# Patient Record
Sex: FEMALE | Race: BLACK OR AFRICAN AMERICAN | Employment: OTHER | URBAN - METROPOLITAN AREA
[De-identification: names, ages, dates, MRNs, and addresses within clinical notes are randomized per-mention and may not be internally consistent; named-entity substitution may affect disease eponyms.]

---

## 2017-03-23 RX ORDER — AMLODIPINE BESYLATE 5 MG/1
TABLET ORAL
Qty: 90 TAB | Refills: 0 | Status: SHIPPED | OUTPATIENT
Start: 2017-03-23 | End: 2017-08-02 | Stop reason: SDUPTHER

## 2017-04-03 ENCOUNTER — OFFICE VISIT (OUTPATIENT)
Dept: FAMILY MEDICINE CLINIC | Age: 52
End: 2017-04-03

## 2017-04-03 VITALS
DIASTOLIC BLOOD PRESSURE: 80 MMHG | SYSTOLIC BLOOD PRESSURE: 120 MMHG | OXYGEN SATURATION: 99 % | HEIGHT: 62 IN | WEIGHT: 161.8 LBS | RESPIRATION RATE: 16 BRPM | HEART RATE: 71 BPM | BODY MASS INDEX: 29.77 KG/M2 | TEMPERATURE: 98.1 F

## 2017-04-03 DIAGNOSIS — Z13.0 SCREENING FOR DEFICIENCY ANEMIA: ICD-10-CM

## 2017-04-03 DIAGNOSIS — E55.9 VITAMIN D DEFICIENCY: ICD-10-CM

## 2017-04-03 DIAGNOSIS — I10 ESSENTIAL HYPERTENSION: Primary | ICD-10-CM

## 2017-04-03 DIAGNOSIS — Z13.220 SCREENING FOR HYPERLIPIDEMIA: ICD-10-CM

## 2017-04-03 DIAGNOSIS — Z13.1 SCREENING FOR DIABETES MELLITUS: ICD-10-CM

## 2017-04-03 RX ORDER — AMLODIPINE BESYLATE 5 MG/1
TABLET ORAL
Qty: 90 TAB | Refills: 0 | Status: CANCELLED | OUTPATIENT
Start: 2017-04-03

## 2017-04-03 NOTE — MR AVS SNAPSHOT
Visit Information Date & Time Provider Department Dept. Phone Encounter #  
 4/3/2017  9:30 AM [facility] [phone] Follow-up Instructions Return in about 1 month (around 5/3/2017). Upcoming Health Maintenance Date Due  
 PAP AKA CERVICAL CYTOLOGY 3/3/2018 BREAST CANCER SCRN MAMMOGRAM 8/6/2018 COLONOSCOPY 4/12/2019 DTaP/Tdap/Td series (2 - Td) 3/3/2025 Allergies as of 4/3/2017  Review Complete On: 4/3/2017 By: Thad Castillo MD  
 No Known Allergies Current Immunizations  Never Reviewed Name Date Tdap 3/3/2015 10:17 AM  
  
 Not reviewed this visit You Were Diagnosed With   
  
 Codes Comments Essential hypertension    -  Primary ICD-10-CM: I10 
ICD-9-CM: 401.9 Screening for hyperlipidemia     ICD-10-CM: Z13.220 ICD-9-CM: V77.91 Screening for diabetes mellitus     ICD-10-CM: Z13.1 ICD-9-CM: V77.1 Vitamin D deficiency     ICD-10-CM: E55.9 ICD-9-CM: 268.9 Screening for deficiency anemia     ICD-10-CM: Z13.0 ICD-9-CM: V78.1 Vitals BP Pulse Temp Resp Height(growth percentile) Weight(growth percentile) 120/80 (BP 1 Location: Left arm, BP Patient Position: Sitting) 71 98.1 °F (36.7 °C) (Oral) 16 5' 2\" (1.575 m) 161 lb 12.8 oz (73.4 kg) LMP SpO2 BMI OB Status Smoking Status 04/03/2017 99% 29.59 kg/m2 Having regular periods Never Smoker Vitals History BMI and BSA Data Body Mass Index Body Surface Area  
 29.59 kg/m 2 1.79 m 2 Preferred Pharmacy Pharmacy Name Phone FARM [id number] - [address] [phone] Your Updated Medication List  
  
   
This list is accurate as of: 4/3/17 10:06 AM.  Always use your most recent med list. amLODIPine 5 mg tablet Commonly known as:  Sherald Burkitt TAKE ONE TABLET BY MOUTH ONE TIME DAILY  
  
 cholecalciferol 1,000 unit Cap Commonly known as:  VITAMIN D3 Take 1,000 Units by mouth two (2) times a day. IRON PO Take 325 mg by mouth three (3) times daily. MULTIVITAMIN WITH IRON PO Take 1 Tab by mouth daily. Follow-up Instructions Return in about 1 month (around 5/3/2017). To-Do List   
 04/03/2017 Lab:  CBC W/O DIFF   
  
 04/03/2017 Lab:  HEMOGLOBIN A1C WITH EAG   
  
 04/03/2017 Lab:  LIPID PANEL   
  
 04/03/2017 Lab:  METABOLIC PANEL, COMPREHENSIVE   
  
 04/03/2017 Lab:  VITAMIN D, 25 HYDROXY Patient Instructions Low Sodium Diet (2,000 Milligram): Care Instructions Your Care Instructions Too much sodium causes your body to hold on to extra water. This can raise your blood pressure and force your heart and kidneys to work harder. In very serious cases, this could cause you to be put in the hospital. It might even be life-threatening. By limiting sodium, you will feel better and lower your risk of serious problems. The most common source of sodium is salt. People get most of the salt in their diet from canned, prepared, and packaged foods. Fast food and restaurant meals also are very high in sodium. Your doctor will probably limit your sodium to less than 2,000 milligrams (mg) a day. This limit counts all the sodium in prepared and packaged foods and any salt you add to your food. Follow-up care is a key part of your treatment and safety. Be sure to make and go to all appointments, and call your doctor if you are having problems. It's also a good idea to know your test results and keep a list of the medicines you take. How can you care for yourself at home? Read food labels · Read labels on cans and food packages. The labels tell you how much sodium is in each serving. Make sure that you look at the serving size. If you eat more than the serving size, you have eaten more sodium. · Food labels also tell you the Percent Daily Value for sodium.  Choose products with low Percent Daily Values for sodium. · Be aware that sodium can come in forms other than salt, including monosodium glutamate (MSG), sodium citrate, and sodium bicarbonate (baking soda). MSG is often added to Asian food. When you eat out, you can sometimes ask for food without MSG or added salt. Buy low-sodium foods · Buy foods that are labeled \"unsalted\" (no salt added), \"sodium-free\" (less than 5 mg of sodium per serving), or \"low-sodium\" (less than 140 mg of sodium per serving). Foods labeled \"reduced-sodium\" and \"light sodium\" may still have too much sodium. Be sure to read the label to see how much sodium you are getting. · Buy fresh vegetables, or frozen vegetables without added sauces. Buy low-sodium versions of canned vegetables, soups, and other canned goods. Prepare low-sodium meals · Cut back on the amount of salt you use in cooking. This will help you adjust to the taste. Do not add salt after cooking. One teaspoon of salt has about 2,300 mg of sodium. · Take the salt shaker off the table. · Flavor your food with garlic, lemon juice, onion, vinegar, herbs, and spices. Do not use soy sauce, lite soy sauce, steak sauce, onion salt, garlic salt, celery salt, mustard, or ketchup on your food. · Use low-sodium salad dressings, sauces, and ketchup. Or make your own salad dressings and sauces without adding salt. · Use less salt (or none) when recipes call for it. You can often use half the salt a recipe calls for without losing flavor. Other foods such as rice, pasta, and grains do not need added salt. · Rinse canned vegetables, and cook them in fresh water. This removes somebut not allof the salt. · Avoid water that is naturally high in sodium or that has been treated with water softeners, which add sodium. Call your local water company to find out the sodium content of your water supply. If you buy bottled water, read the label and choose a sodium-free brand. Avoid high-sodium foods · Avoid eating: ¨ Smoked, cured, salted, and canned meat, fish, and poultry. ¨ Ham, ravi, hot dogs, and luncheon meats. ¨ Regular, hard, and processed cheese and regular peanut butter. ¨ Crackers with salted tops, and other salted snack foods such as pretzels, chips, and salted popcorn. ¨ Frozen prepared meals, unless labeled low-sodium. ¨ Canned and dried soups, broths, and bouillon, unless labeled sodium-free or low-sodium. ¨ Canned vegetables, unless labeled sodium-free or low-sodium. ¨ [place] [employer] fries, pizza, tacos, and other fast foods. ¨ Pickles, olives, ketchup, and other condiments, especially soy sauce, unless labeled sodium-free or low-sodium. Where can you learn more? Go to http://kings-junito.info/. Enter K811 in the search box to learn more about \"Low Sodium Diet (2,000 Milligram): Care Instructions. \" Current as of: July 26, 2016 Content Version: 11.2 © 9342-8731 [employer]. Care instructions adapted under license by [employer] (which disclaims liability or warranty for this information). If you have questions about a medical condition or this instruction, always ask your healthcare professional. [address] any warranty or liability for your use of this information. Introducing [facility] & HEALTH SERVICES! [facility] introduces [facility] patient portal. Now you can access parts of your medical record, email your doctor's office, and request medication refills online. 1. In your internet browser, go to [email or web address] 2. Click on the First Time User? Click Here link in the Sign In box. You will see the New Member Sign Up page. 3. Enter your [facility] Access Code exactly as it appears below. You will not need to use this code after youve completed the sign-up process. If you do not sign up before the expiration date, you must request a new code. · [employer] Access Code: [email or web address] Expires: 7/2/2017 10:06 AM 
 
4. Enter the last four digits of your Social Security Number (xxxx) and Date of Birth (mm/dd/yyyy) as indicated and click Submit. You will be taken to the next sign-up page. 5. Create a [employer] ID. This will be your [employer] login ID and cannot be changed, so think of one that is secure and easy to remember. 6. Create a [employer] password. You can change your password at any time. 7. Enter your Password Reset Question and Answer. This can be used at a later time if you forget your password. 8. Enter your e-mail address. You will receive e-mail notification when new information is available in [address] 9. Click Sign Up. You can now view and download portions of your medical record. 10. Click the Download Summary menu link to download a portable copy of your medical information. If you have questions, please visit the Frequently Asked Questions section of the [employer] website. Remember, [employer] is NOT to be used for urgent needs. For medical emergencies, dial 911. Now available from your iPhone and Android! Please provide this summary of care documentation to your next provider. Your primary care clinician is listed as Vj Cárdenas. If you have any questions after today's visit, please call [phone].

## 2017-04-03 NOTE — PROGRESS NOTES
1. Have you been to the ER, urgent care clinic since your last visit? Hospitalized since your last visit? No    2. Have you seen or consulted any other health care providers outside of the [facility] since your last visit? Include any pap smears or colon screening.  No

## 2017-04-03 NOTE — PATIENT INSTRUCTIONS

## 2017-04-03 NOTE — PROGRESS NOTES
HISTORY OF PRESENT ILLNESS  Suad Antonio is a 45 y.o. female. HPI: here for routine follow up. Has h/o hypertension. Takes medication with compliance but this time she ran out of medication and has not taken it since last 4 days. Today vitals stable. She is asymptomatic. Denies any headache, dizziness, no chest pain or trouble breathing, no arm or leg weakness. No nausea or vomiting, no weight or appetite changes, no depression or anxiety. No urine or bowel complains, no palpitation, no diaphoresis. No abdominal pain. No sleep concern. Visit Vitals    /80 (BP 1 Location: Left arm, BP Patient Position: Sitting)    Pulse 71    Temp 98.1 °F (36.7 °C) (Oral)    Resp 16    Ht 5' 2\" (1.575 m)    Wt 161 lb 12.8 oz (73.4 kg)    SpO2 99%    BMI 29.59 kg/m2     Mentioned that one time episode of feeling first upper ext bilateral heavy and felt swollen then felt both legs swollen and heavy. Went away in little bit on its own. Only thing she can recall is ate sea food but never had any problem before. No other concern. No recurrence of episode after that. Review prior labs. Now due for labs. Lab Results   Component Value Date/Time    Vitamin D 25-Hydroxy 38.5 02/12/2016 09:45 AM       Lab Results   Component Value Date/Time    Hemoglobin A1c 5.7 02/12/2016 09:45 AM     Lab Results   Component Value Date/Time    Sodium 139 02/12/2016 09:45 AM    Potassium 4.0 02/12/2016 09:45 AM    Chloride 104 02/12/2016 09:45 AM    CO2 29 02/12/2016 09:45 AM    Anion gap 6 02/12/2016 09:45 AM    Glucose 85 02/12/2016 09:45 AM    BUN 10 02/12/2016 09:45 AM    Creatinine 0.60 02/12/2016 09:45 AM    BUN/Creatinine ratio 17 02/12/2016 09:45 AM    GFR est AA >60 02/12/2016 09:45 AM    GFR est non-AA >60 02/12/2016 09:45 AM    Calcium 8.9 02/12/2016 09:45 AM    Bilirubin, total 0.3 02/12/2016 09:45 AM    AST (SGOT) 11 02/12/2016 09:45 AM    Alk.  phosphatase 64 02/12/2016 09:45 AM    Protein, total 7.3 02/12/2016 09:45 AM Albumin 4.0 02/12/2016 09:45 AM    Globulin 3.3 02/12/2016 09:45 AM    A-G Ratio 1.2 02/12/2016 09:45 AM    ALT (SGPT) 19 02/12/2016 09:45 AM     Lab Results   Component Value Date/Time    Cholesterol, total 194 02/12/2016 09:45 AM    HDL Cholesterol 56 02/12/2016 09:45 AM    LDL, calculated 120 02/12/2016 09:45 AM    VLDL, calculated 18 02/12/2016 09:45 AM    Triglyceride 90 02/12/2016 09:45 AM    CHOL/HDL Ratio 3.5 02/12/2016 09:45 AM       ROS: see HPI     Physical Exam   Constitutional: She is oriented to person, place, and time. No distress. Neck: No thyromegaly present. Cardiovascular: Normal rate, regular rhythm and normal heart sounds. Pulmonary/Chest:   CTA   Abdominal: Soft. Bowel sounds are normal. There is no tenderness. Musculoskeletal: She exhibits no edema. Lymphadenopathy:     She has no cervical adenopathy. Neurological: She is oriented to person, place, and time. Psychiatric: Her behavior is normal.       ASSESSMENT and PLAN    ICD-10-CM ICD-9-CM    1. Essential hypertension: for now off medication since last 4 days and still vitals stable and asymptomatic. She wants to try without medication. Also she will keep blood pressure monitor at home and bring the log next visit. Low salt diet. Will f/u next visit and for now keeping her off norvasc. B15 234.1 METABOLIC PANEL, COMPREHENSIVE   2. Screening for hyperlipidemia Z13.220 V77.91 LIPID PANEL   3. Screening for diabetes mellitus Z13.1 V77.1 HEMOGLOBIN A1C WITH EAG   4. Vitamin D deficiency: recheck labs. E55.9 268.9 VITAMIN D, 25 HYDROXY   5. Screening for deficiency anemia: due to her episode of muscle cramps and felt swelling of ext. For now also advised to observe diet if any reoccurance of symptoms. And will f/u next visit after lab result. She agrees and advised to drink plenty of fluid. Z13.0 V78.1 CBC W/O DIFF   Pt understood and agrees with above plan.    Review    Follow-up Disposition:  Return in about 1 month (around 5/3/2017).

## 2017-05-24 ENCOUNTER — HOSPITAL ENCOUNTER (OUTPATIENT)
Dept: LAB | Age: 52
Discharge: HOME | End: 2017-05-24
Payer: OTHER GOVERNMENT

## 2017-05-24 DIAGNOSIS — Z13.0 SCREENING FOR DEFICIENCY ANEMIA: ICD-10-CM

## 2017-05-24 DIAGNOSIS — Z13.1 SCREENING FOR DIABETES MELLITUS: ICD-10-CM

## 2017-05-24 DIAGNOSIS — I10 ESSENTIAL HYPERTENSION: ICD-10-CM

## 2017-05-24 DIAGNOSIS — E55.9 VITAMIN D DEFICIENCY: ICD-10-CM

## 2017-05-24 DIAGNOSIS — Z13.220 SCREENING FOR HYPERLIPIDEMIA: ICD-10-CM

## 2017-05-24 LAB
25(OH)D3 SERPL-MCNC: 40.6 NG/ML (ref 30–100)
ALBUMIN SERPL BCP-MCNC: 3.7 G/DL (ref 3.4–5)
ALBUMIN/GLOB SERPL: 1.1 {RATIO} (ref 0.8–1.7)
ALP SERPL-CCNC: 68 U/L (ref 45–117)
ALT SERPL-CCNC: 18 U/L (ref 13–56)
ANION GAP BLD CALC-SCNC: 7 MMOL/L (ref 3–18)
AST SERPL W P-5'-P-CCNC: 15 U/L (ref 15–37)
BILIRUB SERPL-MCNC: 0.1 MG/DL (ref 0.2–1)
BUN SERPL-MCNC: 8 MG/DL (ref 7–18)
BUN/CREAT SERPL: 17 (ref 12–20)
CALCIUM SERPL-MCNC: 9 MG/DL (ref 8.5–10.1)
CHLORIDE SERPL-SCNC: 104 MMOL/L (ref 100–108)
CHOLEST SERPL-MCNC: 198 MG/DL
CO2 SERPL-SCNC: 29 MMOL/L (ref 21–32)
CREAT SERPL-MCNC: 0.48 MG/DL (ref 0.6–1.3)
ERYTHROCYTE [DISTWIDTH] IN BLOOD BY AUTOMATED COUNT: 13.1 % (ref 11.6–14.5)
EST. AVERAGE GLUCOSE BLD GHB EST-MCNC: 117 MG/DL
GLOBULIN SER CALC-MCNC: 3.3 G/DL (ref 2–4)
GLUCOSE SERPL-MCNC: 85 MG/DL (ref 74–99)
HBA1C MFR BLD: 5.7 % (ref 4.2–5.6)
HCT VFR BLD AUTO: 36.4 % (ref 35–45)
HDLC SERPL-MCNC: 59 MG/DL (ref 40–60)
HDLC SERPL: 3.4 {RATIO} (ref 0–5)
HGB BLD-MCNC: 12.3 G/DL (ref 12–16)
LDLC SERPL CALC-MCNC: 123.8 MG/DL (ref 0–100)
LIPID PROFILE,FLP: ABNORMAL
MCH RBC QN AUTO: 29.2 PG (ref 24–34)
MCHC RBC AUTO-ENTMCNC: 33.8 G/DL (ref 31–37)
MCV RBC AUTO: 86.5 FL (ref 74–97)
PLATELET # BLD AUTO: 361 K/UL (ref 135–420)
PMV BLD AUTO: 11 FL (ref 9.2–11.8)
POTASSIUM SERPL-SCNC: 4.1 MMOL/L (ref 3.5–5.5)
PROT SERPL-MCNC: 7 G/DL (ref 6.4–8.2)
RBC # BLD AUTO: 4.21 M/UL (ref 4.2–5.3)
SODIUM SERPL-SCNC: 140 MMOL/L (ref 136–145)
TRIGL SERPL-MCNC: 76 MG/DL (ref ?–150)
VLDLC SERPL CALC-MCNC: 15.2 MG/DL
WBC # BLD AUTO: 5.9 K/UL (ref 4.6–13.2)

## 2017-05-24 PROCEDURE — 80061 LIPID PANEL: CPT | Performed by: FAMILY MEDICINE

## 2017-05-24 PROCEDURE — 85027 COMPLETE CBC AUTOMATED: CPT | Performed by: FAMILY MEDICINE

## 2017-05-24 PROCEDURE — 80053 COMPREHEN METABOLIC PANEL: CPT | Performed by: FAMILY MEDICINE

## 2017-05-24 PROCEDURE — 83036 HEMOGLOBIN GLYCOSYLATED A1C: CPT | Performed by: FAMILY MEDICINE

## 2017-05-24 PROCEDURE — 82306 VITAMIN D 25 HYDROXY: CPT | Performed by: FAMILY MEDICINE

## 2017-05-24 PROCEDURE — 36415 COLL VENOUS BLD VENIPUNCTURE: CPT | Performed by: FAMILY MEDICINE

## 2017-05-24 NOTE — PROGRESS NOTES
Let pt know that labs are stable. No acute changes. Will discuss further on follow up visit. Thanks.

## 2017-05-25 NOTE — PROGRESS NOTES
Spoke with patient (2 verifiers name/) regarding labs are stable and no acute changes. Patient informed further discussion on follow up visit. Patient voiced understanding.

## 2017-06-27 ENCOUNTER — HOSPITAL ENCOUNTER (OUTPATIENT)
Dept: GENERAL RADIOLOGY | Age: 52
Discharge: HOME | End: 2017-06-27
Payer: OTHER GOVERNMENT

## 2017-06-27 ENCOUNTER — OFFICE VISIT (OUTPATIENT)
Dept: FAMILY MEDICINE CLINIC | Age: 52
End: 2017-06-27

## 2017-06-27 VITALS
RESPIRATION RATE: 16 BRPM | HEART RATE: 89 BPM | SYSTOLIC BLOOD PRESSURE: 138 MMHG | OXYGEN SATURATION: 98 % | WEIGHT: 160.4 LBS | DIASTOLIC BLOOD PRESSURE: 90 MMHG | HEIGHT: 62 IN | BODY MASS INDEX: 29.52 KG/M2

## 2017-06-27 DIAGNOSIS — G89.29 CHRONIC RIGHT SHOULDER PAIN: ICD-10-CM

## 2017-06-27 DIAGNOSIS — G47.9 SLEEP TROUBLE: ICD-10-CM

## 2017-06-27 DIAGNOSIS — I10 ESSENTIAL HYPERTENSION: Primary | ICD-10-CM

## 2017-06-27 DIAGNOSIS — M25.511 CHRONIC RIGHT SHOULDER PAIN: ICD-10-CM

## 2017-06-27 PROCEDURE — 73030 X-RAY EXAM OF SHOULDER: CPT

## 2017-06-27 RX ORDER — TRAZODONE HYDROCHLORIDE 50 MG/1
50 TABLET ORAL
Qty: 30 TAB | Refills: 1 | Status: SHIPPED | OUTPATIENT
Start: 2017-06-27 | End: 2018-02-16 | Stop reason: SDUPTHER

## 2017-06-27 NOTE — PROGRESS NOTES
HISTORY OF PRESENT ILLNESS  Ketan Quintanilla is a 45 y.o. female. HPI: Here for routine follow up. Noted mild elevated blood pressure. Asymptomatic. Repeat was even little higher than first reading. Complaint with medication. No side effects. Not sure watching low salt diet. Denies any headache, dizziness, no chest pain or trouble breathing, no arm or leg weakness. No nausea or vomiting, no weight or appetite changes, no depression or anxiety. No urine or bowel complains, no palpitation, no diaphoresis. Feeling trouble sleeping. Avoiding excessive caffeine later during day. Said average sleep 4-6 hours. Wakes up fatigue. Able to do routine activity but feels tired. No depression or anxiety. Trouble maintaining and initiating sleep. Tried otc melatonin and benadryl . Not helping much. Benadryl gives her nasal congestion and dryness. Visit Vitals    /90 (BP 1 Location: Left arm, BP Patient Position: Sitting)    Pulse 89    Resp 16    Ht 5' 2\" (1.575 m)    Wt 160 lb 6.4 oz (72.8 kg)    SpO2 98%    BMI 29.34 kg/m2     Review medication list, vitals, problem list,allergies. Review labs. Lab Results   Component Value Date/Time    WBC 5.9 05/24/2017 09:25 AM    HGB 12.3 05/24/2017 09:25 AM    HCT 36.4 05/24/2017 09:25 AM    PLATELET 725 70/76/5922 09:25 AM    MCV 86.5 05/24/2017 09:25 AM     Lab Results   Component Value Date/Time    Sodium 140 05/24/2017 09:25 AM    Potassium 4.1 05/24/2017 09:25 AM    Chloride 104 05/24/2017 09:25 AM    CO2 29 05/24/2017 09:25 AM    Anion gap 7 05/24/2017 09:25 AM    Glucose 85 05/24/2017 09:25 AM    BUN 8 05/24/2017 09:25 AM    Creatinine 0.48 05/24/2017 09:25 AM    BUN/Creatinine ratio 17 05/24/2017 09:25 AM    GFR est AA >60 05/24/2017 09:25 AM    GFR est non-AA >60 05/24/2017 09:25 AM    Calcium 9.0 05/24/2017 09:25 AM    Bilirubin, total 0.1 05/24/2017 09:25 AM    AST (SGOT) 15 05/24/2017 09:25 AM    Alk.  phosphatase 68 05/24/2017 09:25 AM    Protein, total 7.0 05/24/2017 09:25 AM    Albumin 3.7 05/24/2017 09:25 AM    Globulin 3.3 05/24/2017 09:25 AM    A-G Ratio 1.1 05/24/2017 09:25 AM    ALT (SGPT) 18 05/24/2017 09:25 AM     Lab Results   Component Value Date/Time    TSH 1.040 06/09/2014 12:00 AM     Lab Results   Component Value Date/Time    Hemoglobin A1c 5.7 05/24/2017 09:25 AM     Lab Results   Component Value Date/Time    Cholesterol, total 198 05/24/2017 09:25 AM    HDL Cholesterol 59 05/24/2017 09:25 AM    LDL, calculated 123.8 05/24/2017 09:25 AM    VLDL, calculated 15.2 05/24/2017 09:25 AM    Triglyceride 76 05/24/2017 09:25 AM    CHOL/HDL Ratio 3.4 05/24/2017 09:25 AM     Also c/o rt shoulder pain and limited ROM. Going on since more than couple of months. Having trouble with internal rotation. Also elevate shoulder more than 60 degree. No fall or injury. Work with lifting and pushing pt in wheel chairs. No tingling or numbness. Localized pain. No radiation. When gets worse it is moderate intensity. Certain positive reaching over head makes it worse. ROS: see HPI     Physical Exam   Constitutional: She is oriented to person, place, and time. No distress. Neck: No thyromegaly present. Cardiovascular: Normal rate, regular rhythm and normal heart sounds. Pulmonary/Chest:   CTA   Abdominal: Soft. Bowel sounds are normal. There is no tenderness. Musculoskeletal: She exhibits no edema. Left shoulder: no point tenderness. No swelling or redness. ROM limited abduction above 60 degree. Neurological: She is oriented to person, place, and time. Psychiatric: Her behavior is normal.       ASSESSMENT and PLAN    ICD-10-CM ICD-9-CM    1. Essential hypertension: for now will observe. Low salt diet. Continue current dose of medication. If it is elevated next visit in a month will consider dose adjustment. I10 401.9    2. Sleep trouble: discussed sleep hygiene. Also given trazodone. Discussed medication side effects.   G47.9 780.50 traZODone (DESYREL) 50 mg tablet   3. Chronic right shoulder pain: ? Rotator cuff problem. For now will send her to PT. Symptomatic treatment with NSAID as needed with food. Ice application. F/u next visit. Given hand out for home exercise. M25.511 719.41 XR SHOULDER RT AP/LAT MIN 2 V    G89.29 338.29 REFERRAL TO PHYSICAL THERAPY   Pt understood and agrees with above plan. Review HM    She will schedule a physical around the time she is due for mammogram.   Follow-up Disposition: Not on File 1 month.

## 2017-06-27 NOTE — PATIENT INSTRUCTIONS
Shoulder Arthritis: Exercises  Your Care Instructions  Here are some examples of typical rehabilitation exercises for your condition. Start each exercise slowly. Ease off the exercise if you start to have pain. Your doctor or physical therapist will tell you when you can start these exercises and which ones will work best for you. How to do the exercises  Shoulder flexion (lying down)    Note: To make a wand for this exercise, use a piece of PVC pipe or a broom handle with the broom removed. Make the wand about a foot wider than your shoulders. 1. Lie on your back, holding a wand with both hands. Your palms should face down as you hold the wand. 2. Keeping your elbows straight, slowly raise your arms over your head. Raise them until you feel a stretch in your shoulders, upper back, and chest.  3. Hold for 15 to 30 seconds. 4. Repeat 2 to 4 times. Shoulder rotation (lying down)    Note: To make a wand for this exercise, use a piece of PVC pipe or a broom handle with the broom removed. Make the wand about a foot wider than your shoulders. 1. Lie on your back. Hold a wand with both hands with your elbows bent and palms up. 2. Keep your elbows close to your body, and move the wand across your body toward the sore arm. 3. Hold for 8 to 12 seconds. 4. Repeat 2 to 4 times. Shoulder internal rotation with towel    1. Hold a towel above and behind your head with the arm that is not sore. 2. With your sore arm, reach behind your back and grasp the towel. 3. With the arm above your head, pull the towel upward. Do this until you feel a stretch on the front and outside of your sore shoulder. 4. Hold 15 to 30 seconds. 5. Repeat 2 to 4 times. Shoulder blade squeeze    1. Stand with your arms at your sides, and squeeze your shoulder blades together. Do not raise your shoulders up as you squeeze. 2. Hold 6 seconds. 3. Repeat 8 to 12 times.   Resisted rows    Note: For this exercise, you will need elastic exercise material, such as surgical tubing or Thera-Band. 1. Put the band around a solid object at about waist level. (A bedpost will work well.) Each hand should hold an end of the band. 2. With your elbows at your sides and bent to 90 degrees, pull the band back. Your shoulder blades should move toward each other. Return to the starting position. 3. Repeat 8 to 12 times. External rotator strengthening exercise    1. Start by tying a piece of elastic exercise material to a doorknob. You can use surgical tubing or Thera-Band. (You may also hold one end of the band in each hand.)  2. Stand or sit with your shoulder relaxed and your elbow bent 90 degrees. Your upper arm should rest comfortably against your side. Squeeze a rolled towel between your elbow and your body for comfort. This will help keep your arm at your side. 3. Hold one end of the elastic band with the hand of the painful arm. 4. Start with your forearm across your belly. Slowly rotate the forearm out away from your body. Keep your elbow and upper arm tucked against the towel roll or the side of your body until you begin to feel tightness in your shoulder. Slowly move your arm back to where you started. 5. Repeat 8 to 12 times. Internal rotator strengthening exercise    1. Start by tying a piece of elastic exercise material to a doorknob. You can use surgical tubing or Thera-Band. 2. Stand or sit with your shoulder relaxed and your elbow bent 90 degrees. Your upper arm should rest comfortably against your side. Squeeze a rolled towel between your elbow and your body for comfort. This will help keep your arm at your side. 3. Hold one end of the elastic band in the hand of the painful arm. 4. Slowly rotate your forearm toward your body until it touches your belly. Slowly move it back to where you started. 5. Keep your elbow and upper arm firmly tucked against the towel roll or at your side. 6. Repeat 8 to 12 times.   Pendulum swing    Note: If you have pain in your back, do not do this exercise. 1. Hold on to a table or the back of a chair with your good arm. Then bend forward a little and let your sore arm hang straight down. This exercise does not use the arm muscles. Rather, use your legs and your hips to create movement that makes your arm swing freely. 2. Use the movement from your hips and legs to guide the slightly swinging arm back and forth like a pendulum (or elephant trunk). Then guide it in circles that start small (about the size of a dinner plate). Make the circles a bit larger each day, as your pain allows. 3. Do this exercise for 5 minutes, 5 to 7 times each day. 4. As you have less pain, try bending over a little farther to do this exercise. This will increase the amount of movement at your shoulder. Follow-up care is a key part of your treatment and safety. Be sure to make and go to all appointments, and call your doctor if you are having problems. It's also a good idea to know your test results and keep a list of the medicines you take. Where can you learn more? Go to http://kings-junito.info/. Enter H562 in the search box to learn more about \"Shoulder Arthritis: Exercises. \"  Current as of: March 21, 2017  Content Version: 11.3  © 6169-0418 [employer]. Care instructions adapted under license by [employer] (which disclaims liability or warranty for this information). If you have questions about a medical condition or this instruction, always ask your healthcare professional. [address] any warranty or liability for your use of this information. Rotator Cuff: Exercises  Your Care Instructions  Here are some examples of typical rehabilitation exercises for your condition. Start each exercise slowly. Ease off the exercise if you start to have pain.   Your doctor or physical therapist will tell you when you can start these exercises and which ones will work best for you. How to do the exercises  Pendulum swing    Note: If you have pain in your back, do not do this exercise. 5. Hold on to a table or the back of a chair with your good arm. Then bend forward a little and let your sore arm hang straight down. This exercise does not use the arm muscles. Rather, use your legs and your hips to create movement that makes your arm swing freely. 6. Use the movement from your hips and legs to guide the slightly swinging arm back and forth like a pendulum (or elephant trunk). Then guide it in circles that start small (about the size of a dinner plate). Make the circles a bit larger each day, as your pain allows. 7. Do this exercise for 5 minutes, 5 to 7 times each day. 8. As you have less pain, try bending over a little farther to do this exercise. This will increase the amount of movement at your shoulder. Posterior stretching exercise    5. Hold the elbow of your injured arm with your other hand. 6. Use your hand to pull your injured arm gently up and across your body. You will feel a gentle stretch across the back of your injured shoulder. 7. Hold for at least 15 to 30 seconds. Then slowly lower your arm. 8. Repeat 2 to 4 times. Up-the-back stretch    Note: Your doctor or physical therapist may want you to wait to do this stretch until you have regained most of your range of motion and strength. You can do this stretch in different ways. Hold any of these stretches for at least 15 to 30 seconds. Repeat them 2 to 4 times. 6. Put your hand in your back pocket. Let it rest there to stretch your shoulder. 7. With your other hand, hold your injured arm (palm outward) behind your back by the wrist. Pull your arm up gently to stretch your shoulder. 8. Next, put a towel over your other shoulder. Put the hand of your injured arm behind your back. Now hold the back end of the towel. With the other hand, hold the front end of the towel in front of your body.  Pull gently on the front end of the towel. This will bring your hand farther up your back to stretch your shoulder. Overhead stretch    4. Standing about an arm's length away, grasp onto a solid surface. You could use a countertop, a doorknob, or the back of a sturdy chair. 5. With your knees slightly bent, bend forward with your arms straight. Lower your upper body, and let your shoulders stretch. 6. As your shoulders are able to stretch farther, you may need to take a step or two backward. 7. Hold for at least 15 to 30 seconds. Then stand up and relax. If you had stepped back during your stretch, step forward so you can keep your hands on the solid surface. 8. Repeat 2 to 4 times. Shoulder flexion (lying down)    Note: To make a wand for this exercise, use a piece of PVC pipe or a broom handle with the broom removed. Make the wand about a foot wider than your shoulders. 4. Lie on your back, holding a wand with both hands. Your palms should face down as you hold the wand. 5. Keeping your elbows straight, slowly raise your arms over your head. Raise them until you feel a stretch in your shoulders, upper back, and chest.  6. Hold for 15 to 30 seconds. 7. Repeat 2 to 4 times. Shoulder rotation (lying down)    Note: To make a wand for this exercise, use a piece of PVC pipe or a broom handle with the broom removed. Make the wand about a foot wider than your shoulders. 6. Lie on your back. Hold a wand with both hands with your elbows bent and palms up. 7. Keep your elbows close to your body, and move the wand across your body toward the sore arm. 8. Hold for 8 to 12 seconds. 9. Repeat 2 to 4 times. Wall climbing (to the side)    Note: Avoid any movement that is straight to your side, and be careful not to arch your back. Your arm should stay about 30 degrees to the front of your side.   7. Stand with your side to a wall so that your fingers can just touch it at an angle about 30 degrees toward the front of your body.  8. Walk the fingers of your injured arm up the wall as high as pain permits. Try not to shrug your shoulder up toward your ear as you move your arm up. 9. Hold that position for a count of at least 15 to 20.  10. Walk your fingers back down to the starting position. 11. Repeat at least 2 to 4 times. Try to reach higher each time. Wall climbing (to the front)    Note: During this stretching exercise, be careful not to arch your back. 5. Face a wall, and stand so your fingers can just touch it. 6. Keeping your shoulder down, walk the fingers of your injured arm up the wall as high as pain permits. (Don't shrug your shoulder up toward your ear.)  7. Hold your arm in that position for at least 15 to 30 seconds. 8. Slowly walk your fingers back down to where you started. 9. Repeat at least 2 to 4 times. Try to reach higher each time. Shoulder blade squeeze    1. Stand with your arms at your sides, and squeeze your shoulder blades together. Do not raise your shoulders up as you squeeze. 2. Hold 6 seconds. 3. Repeat 8 to 12 times. Scapular exercise: Arm reach    1. Lie flat on your back. This exercise is a very slight motion that starts with your arms raised (elbows straight, arms straight). 2. From this position, reach higher toward the sanchez or ceiling. Keep your elbows straight. All motion should be from your shoulder blade only. 3. Relax your arms back to where you started. 4. Repeat 8 to 12 times. Arm raise to the side    Note: During this strengthening exercise, your arm should stay about 30 degrees to the front of your side. 1. Slowly raise your injured arm to the side, with your thumb facing up. Raise your arm 60 degrees at the most (shoulder level is 90 degrees). 2. Hold the position for 3 to 5 seconds. Then lower your arm back to your side. If you need to, bring your \"good\" arm across your body and place it under the elbow as you lower your injured arm.  Use your good arm to keep your injured arm from dropping down too fast.  3. Repeat 8 to 12 times. 4. When you first start out, don't hold any extra weight in your hand. As you get stronger, you may use a 1-pound to 2-pound dumbbell or a small can of food. Shoulder flexor and extensor exercise    Note: These are isometric exercises. That means you contract your muscles without actually moving. · Push forward (flex): Stand facing a wall or doorjamb, about 6 inches or less back. Hold your injured arm against your body. Make a closed fist with your thumb on top. Then gently push your hand forward into the wall with about 25% to 50% of your strength. Don't let your body move backward as you push. Hold for about 6 seconds. Relax for a few seconds. Repeat 8 to 12 times. · Push backward (extend): Stand with your back flat against a wall. Your upper arm should be against the wall, with your elbow bent 90 degrees (your hand straight ahead). Push your elbow gently back against the wall with about 25% to 50% of your strength. Don't let your body move forward as you push. Hold for about 6 seconds. Relax for a few seconds. Repeat 8 to 12 times. Scapular exercise: Wall push-ups    Note: This exercise is best done with your fingers somewhat turned out, rather than straight up and down. 1. Stand facing a wall, about 12 inches to 18 inches away. 2. Place your hands on the wall at shoulder height. 3. Slowly bend your elbows and bring your face to the wall. Keep your back and hips straight. 4. Push back to where you started. 5. Repeat 8 to 12 times. 6. When you can do this exercise against a wall comfortably, you can try it against a counter. You can then slowly progress to the end of a couch, then to a sturdy chair, and finally to the floor. Scapular exercise: Retraction    Note: For this exercise, you will need elastic exercise material, such as surgical tubing or Thera-Band. 1. Put the band around a solid object at about waist level.  (A bedpost will work well.) Each hand should hold an end of the band. 2. With your elbows at your sides and bent to 90 degrees, pull the band back. Your shoulder blades should move toward each other. Then move your arms back where you started. 3. Repeat 8 to 12 times. 4. If you have good range of motion in your shoulders, try this exercise with your arms lifted out to the sides. Keep your elbows at a 90-degree angle. Raise the elastic band up to about shoulder level. Pull the band back to move your shoulder blades toward each other. Then move your arms back where you started. Internal rotator strengthening exercise    1. Start by tying a piece of elastic exercise material to a doorknob. You can use surgical tubing or Thera-Band. 2. Stand or sit with your shoulder relaxed and your elbow bent 90 degrees. Your upper arm should rest comfortably against your side. Squeeze a rolled towel between your elbow and your body for comfort. This will help keep your arm at your side. 3. Hold one end of the elastic band in the hand of the painful arm. 4. Slowly rotate your forearm toward your body until it touches your belly. Slowly move it back to where you started. 5. Keep your elbow and upper arm firmly tucked against the towel roll or at your side. 6. Repeat 8 to 12 times. External rotator strengthening exercise    1. Start by tying a piece of elastic exercise material to a doorknob. You can use surgical tubing or Thera-Band. (You may also hold one end of the band in each hand.)  2. Stand or sit with your shoulder relaxed and your elbow bent 90 degrees. Your upper arm should rest comfortably against your side. Squeeze a rolled towel between your elbow and your body for comfort. This will help keep your arm at your side. 3. Hold one end of the elastic band with the hand of the painful arm. 4. Start with your forearm across your belly. Slowly rotate the forearm out away from your body.  Keep your elbow and upper arm tucked against the towel roll or the side of your body until you begin to feel tightness in your shoulder. Slowly move your arm back to where you started. 5. Repeat 8 to 12 times. Follow-up care is a key part of your treatment and safety. Be sure to make and go to all appointments, and call your doctor if you are having problems. It's also a good idea to know your test results and keep a list of the medicines you take. Where can you learn more? Go to http://kings-junito.info/. Enter Radha Voss in the search box to learn more about \"Rotator Cuff: Exercises. \"  Current as of: March 21, 2017  Content Version: 11.3  © 1381-8915 [employer]. Care instructions adapted under license by [employer] (which disclaims liability or warranty for this information). If you have questions about a medical condition or this instruction, always ask your healthcare professional. [place] [address] any warranty or liability for your use of this information. Insomnia: Care Instructions  Your Care Instructions  Insomnia is the inability to sleep well. It is a common problem for most people at some time. Insomnia may make it hard for you to get to sleep, stay asleep, or sleep as long as you need to. This can make you tired and grouchy during the day. It can also make you forgetful, less effective at work, and unhappy. Insomnia can be caused by conditions such as depression or anxiety. Pain can also affect your ability to sleep. When these problems are solved, the insomnia usually clears up. But sometimes bad sleep habits can cause insomnia. If insomnia is affecting your work or your enjoyment of life, you can take steps to improve your sleep. Follow-up care is a key part of your treatment and safety. Be sure to make and go to all appointments, and call your doctor if you are having problems. It's also a good idea to know your test results and keep a list of the medicines you take.   How can you care for yourself at home? What to avoid  · Do not have drinks with caffeine, such as coffee or black tea, for 8 hours before bed. · Do not smoke or use other types of tobacco near bedtime. Nicotine is a stimulant and can keep you awake. · Avoid drinking alcohol late in the evening, because it can cause you to wake in the middle of the night. · Do not eat a big meal close to bedtime. If you are hungry, eat a light snack. · Do not drink a lot of water close to bedtime, because the need to urinate may wake you up during the night. · Do not read or watch TV in bed. Use the bed only for sleeping and sexual activity. What to try  · Go to bed at the same time every night, and wake up at the same time every morning. Do not take naps during the day. · Keep your bedroom quiet, dark, and cool. · Sleep on a comfortable pillow and mattress. · If watching the clock makes you anxious, turn it facing away from you so you cannot see the time. · If you worry when you lie down, start a worry book. Well before bedtime, write down your worries, and then set the book and your concerns aside. · Try meditation or other relaxation techniques before you go to bed. · If you cannot fall asleep, get up and go to another room until you feel sleepy. Do something relaxing. Repeat your bedtime routine before you go to bed again. · Make your house quiet and calm about an hour before bedtime. Turn down the lights, turn off the TV, log off the computer, and turn down the volume on music. This can help you relax after a busy day. When should you call for help? Watch closely for changes in your health, and be sure to contact your doctor if:  · Your efforts to improve your sleep do not work. · Your insomnia gets worse. · You have been feeling down, depressed, or hopeless or have lost interest in things that you usually enjoy. Where can you learn more? Go to http://jerardo.info/.   Enter P513 in the search box to learn more about \"Insomnia: Care Instructions. \"  Current as of: July 26, 2016  Content Version: 11.3  © 7594-2450 [employer]. Care instructions adapted under license by [employer] (which disclaims liability or warranty for this information). If you have questions about a medical condition or this instruction, always ask your healthcare professional. [address] any warranty or liability for your use of this information. Learning About Sleeping Well  What does sleeping well mean? Sleeping well means getting enough sleep. How much sleep is enough varies among people. The number of hours you sleep is not as important as how you feel when you wake up. If you do not feel refreshed, you probably need more sleep. Another sign of not getting enough sleep is feeling tired during the day. The average total nightly sleep time is 7½ to 8 hours. Healthy adults may need a little more or a little less than this. Why is getting enough sleep important? Getting enough quality sleep is a basic part of good health. When your sleep suffers, your mood and your thoughts can suffer too. You may find yourself feeling more grumpy or stressed. Not getting enough sleep also can lead to serious problems, including injury, accidents, anxiety, and depression. What might cause poor sleeping? Many things can cause sleep problems, including:  · Stress. Stress can be caused by fear about a single event, such as giving a speech. Or you may have ongoing stress, such as worry about work or school. · Depression, anxiety, and other mental or emotional conditions. · Changes in your sleep habits or surroundings. This includes changes that happen where you sleep, such as noise, light, or sleeping in a different bed. It also includes changes in your sleep pattern, such as having jet lag or working a late shift.   · Health problems, such as pain, breathing problems, and restless legs syndrome. · Lack of regular exercise. How can you help yourself? Here are some tips that may help you sleep more soundly and wake up feeling more refreshed. Your sleeping area  · Use your bedroom only for sleeping and sex. A bit of light reading may help you fall asleep. But if it doesn't, do your reading elsewhere in the house. Don't watch TV in bed. · Be sure your bed is big enough to stretch out comfortably, especially if you have a sleep partner. · Keep your bedroom quiet, dark, and cool. Use curtains, blinds, or a sleep mask to block out light. To block out noise, use earplugs, soothing music, or a \"white noise\" machine. Your evening and bedtime routine  · Create a relaxing bedtime routine. You might want to take a warm shower or bath, listen to soothing music, or drink a cup of noncaffeinated tea. · Go to bed at the same time every night. And get up at the same time every morning, even if you feel tired. What to avoid  · Limit caffeine (coffee, tea, caffeinated sodas) during the day, and don't have any for at least 4 to 6 hours before bedtime. · Don't drink alcohol before bedtime. Alcohol can cause you to wake up more often during the night. · Don't smoke or use tobacco, especially in the evening. Nicotine can keep you awake. · Don't take naps during the day, especially close to bedtime. · Don't lie in bed awake for too long. If you can't fall asleep, or if you wake up in the middle of the night and can't get back to sleep within 15 minutes or so, get out of bed and go to another room until you feel sleepy. · Don't take medicine right before bed that may keep you awake or make you feel hyper or energized. Your doctor can tell you if your medicine may do this and if you can take it earlier in the day. If you can't sleep  · Imagine yourself in a peaceful, pleasant scene. Focus on the details and feelings of being in a place that is relaxing.   · Get up and do a quiet or boring activity until you feel sleepy. · Don't drink any liquids after 6 p.m. if you wake up often because you have to go to the bathroom. Where can you learn more? Go to http://kings-junito.info/. Enter O116 in the search box to learn more about \"Learning About Sleeping Well. \"  Current as of: July 26, 2016  Content Version: 11.3  © 2072-6900 [employer] Care instructions adapted under license by [employer] (which disclaims liability or warranty for this information). If you have questions about a medical condition or this instruction, always ask your healthcare professional. [place] [address] any warranty or liability for your use of this information.

## 2017-06-27 NOTE — PROGRESS NOTES
Chief Complaint   Patient presents with    Complete Physical    Shoulder Pain     right     Patient states she is here for physical.  Patient also states having right shoulder pain for about 3-4 months intermittently. 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

## 2017-06-28 NOTE — PROGRESS NOTES
Spoke with patient (2 verifiers name/) regarding shoulder x-ray is negative. Patient informed for now to continue with current plans for PT and keep follow up appt. Patient voiced understanding.

## 2017-07-19 ENCOUNTER — HOSPITAL ENCOUNTER (OUTPATIENT)
Dept: PHYSICAL THERAPY | Age: 52
Discharge: HOME | End: 2017-07-19
Payer: OTHER GOVERNMENT

## 2017-07-19 PROCEDURE — 97110 THERAPEUTIC EXERCISES: CPT

## 2017-07-19 PROCEDURE — 97162 PT EVAL MOD COMPLEX 30 MIN: CPT

## 2017-07-19 NOTE — PROGRESS NOTES
[facility] - [facility]              [address]        [place], [address] [place]   [phone] fax    Plan of Care/ Statement of Necessity for Physical Therapy Services  Patient name: Taco Fletcher Start of Care: 2017   Referral source: Emilee Hills MD : 1965    Medical Diagnosis: Pain in right shoulder [M25.511]  Other chronic pain [G89.29]   Onset Date:~4 months prior to I.E. Treatment Diagnosis: R Shoulder Tendinopathy   Prior Hospitalization: see medical history Provider#: [id number]   Medications: Verified on Patient summary List    Comorbidities: HTN   Prior Level of Function: Patient reports compensation with performance of self-care and household ADLs secondary to pain with no specific limitations     The Plan of Care and following information is based on the information from the initial evaluation. Assessment:    Patient presents subjectively with a 4 month history of R shoulder pain without known mechanism of onset. Pain reported along the anterior aspect of the R shoulder with sharp pain reported to radiate along the anterior aspect of the R arm distally to the Deltoid tuberosity. High pain intensity reported (NPRS 1-10/10) with high irritability of symptoms. 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 Patient reports increase in pain with UE pushing movements, overhead elevation, functional IR/ER and lifting with patient R hand dominant. Patient denies limitations in performance of self-care and household ADLs, but does report compensation secondary to pain. Frequent sleep disturbances are reported with increase in pain with R sidelying.  Patient objectively presents with signs and symptoms consistent with R shoulder tendinopathy with patient presenting with painful weakness of the R shoulder external rotators, pain throughout AROM with R shoulder flexion and abduction and a (+) ER lag test. Patient noted upon palpation with generalized swelling localized to the R shoulder. Patient noted with TTP to the R infrascapular and suprascapular fossa without noticeable muscular atrophy.     Patient will continue to benefit from skilled PT services to modify and progress therapeutic interventions, address functional mobility deficits, address ROM deficits, address strength deficits, analyze and address soft tissue restrictions, analyze and cue movement patterns and analyze and modify body mechanics/ergonomics to attain remaining goals. Secondary to high irritability and severity of symptoms at time of initial evaluation primary emphasis to be pain reduction with transition to strengthening throughout increasing degrees of ROM to improve ability to perform functional ADLs and work-related duties without compensation upon improvement in symptoms. Key Information:    Cervical Screen: Patient without reproduction of symptoms with the following cervical AROM with overpressure: Flexion, Extension (no OP), L Sidebend, R Sidebend, L Rotation, R Rotation     ROM:                                                AROM                                                              PROM    Left Right   Left Right   Flexion 144 136 Flexion 158 150 (empty)   Extension 55 50 Extension       Abduction 120 85 Abduction 141 134 (empty)   ER @ 90 Degrees     ER @ 90 Degrees 97 45 (empty)   IR @ 90 Degrees     IR @ 90 Degrees 68 38 (empty)      End Feel / Painful Arc: R GH Flex/Abduction (p!  Throughout range),      Strength:                                                                            L (1-5) R (1-5)   Flexors 3+ 4 (p!)   Abductors 5 2+ (p!)   External Rotators 4+ 3+ (p!)   Internal Rotators 5 5   Extensors 5 5   Elbow Flexion 5 5   Elbow Extension 5 5      Joint Mobility: Unable to assess Glenohumeral mobility secondary to muscular guarding with attempted performance of Grade I mobilizations      Palpation: Patient without muscular atrophy noted to infrascapular and suprascapular fossa with TTP to both on R. TTP: R Subacromial space     Special Tests  ER Lag                                [x] Pos   [] Neg                  Hawkin's Krunal Pila                [x] Pos   [] Neg                    Evaluation Complexity History MEDIUM  Complexity : 1-2 comorbidities / personal factors will impact the outcome/ POC ; Examination MEDIUM Complexity : 3 Standardized tests and measures addressing body structure, function, activity limitation and / or participation in recreation  ;Presentation MEDIUM Complexity : Evolving with changing characteristics  ; Clinical Decision Making MEDIUM Complexity : FOTO score of 26-74  Overall Complexity Rating: MEDIUM  Problem List: pain affecting function, decrease ROM, decrease strength, decrease ADL/ functional abilitiies, decrease activity tolerance, decrease flexibility/ joint mobility and decrease transfer abilities   Treatment Plan may include any combination of the following: Therapeutic exercise, Therapeutic activities, Neuromuscular re-education, Physical agent/modality, Manual therapy and Self Care training  Patient / Family readiness to learn indicated by: asking questions, trying to perform skills and interest  Persons(s) to be included in education: patient (P)  Barriers to Learning/Limitations: None  Patient Goal (s): Get my arm better  Patient Self Reported Health Status: excellent  Rehabilitation Potential: good    Short Term Goals: To be accomplished in 3 weeks:  1. Patient will subjectively report full compliance with prescribed HEP. 2. Patient will objectively demonstrate R shoulder abduction AROM >/=110 degrees in order to improve ease with donning/doffing jackets. 3. Patient will demonstrate R shoulder flexion AROM >/= 145 degrees in order to improve ease with placing objects on high shelves. Long Term Goals: To be accomplished in 6 weeks:  1. Patient will demonstrate a significant functional improvement as demonstrated by a score of >/=65 on FOTO. 2. Patient will demonstrate R shoulder functional ER to T1 in order to improve ease with washing her hair. 3. Patient will subjectively report a 60% improvement in overall symptoms since SoC in order to improve overall quality of life. 4. Patient will subjectively report pain at worst over the last week 6/10 in order to improve sleep quality. Frequency / Duration: Patient to be seen 2 times per week for 6 weeks. Patient/ Caregiver education and instruction: Diagnosis, prognosis, self care and exercises   [x]  Plan of care has been reviewed with LINNETTE Joseph, PT 7/19/2017 2:12 PM  ________________________________________________________________________    I certify that the above Therapy Services are being furnished while the patient is under my care. I agree with the treatment plan and certify that this therapy is necessary.     [de-identified] Signature:____________________  Date:____________Time: _________    Please sign and return to [facility] - [address], [facility] [place]   [phone] fax
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
## 2017-08-02 RX ORDER — AMLODIPINE BESYLATE 5 MG/1
TABLET ORAL
Qty: 90 TAB | Refills: 0 | Status: SHIPPED | OUTPATIENT
Start: 2017-08-02 | End: 2017-11-02 | Stop reason: SDUPTHER

## 2017-08-03 ENCOUNTER — APPOINTMENT (OUTPATIENT)
Dept: PHYSICAL THERAPY | Age: 52
End: 2017-08-03

## 2017-08-10 ENCOUNTER — APPOINTMENT (OUTPATIENT)
Dept: PHYSICAL THERAPY | Age: 52
End: 2017-08-10

## 2017-08-17 ENCOUNTER — APPOINTMENT (OUTPATIENT)
Dept: PHYSICAL THERAPY | Age: 52
End: 2017-08-17

## 2017-08-17 NOTE — PROGRESS NOTES
[facility] - [facility]              [address]        [place], [address] [place]   [phone] fax    Discharge Summary  Patient name: Beatrice Delgado Start of Care: 2017   Referral source: Prakash Bo MD : 1965   Medical/Treatment Diagnosis: Pain in right shoulder [M25.511]  Other chronic pain [G89.29] Onset Date:~4 months prior to I.E. Prior Hospitalization: see medical history Provider#: [id number]   Medications: Verified on Patient Summary List     Comorbidities: HTN   Prior Level of Function: Patient reports compensation with performance of self-care and household ADLs secondary to pain with no specific limitations  Visits from Start of Care: 1    Missed Visits: 0  Reporting Period : 2017 to 2017    Summary of Care:    Short Term Goals: To be accomplished in 3 weeks:  1. Patient will subjectively report full compliance with prescribed HEP. Eval HEP provided  At DC: Unable to be assessed secondary to patient non-compliance with prescribed HEP  2. Patient will objectively demonstrate R shoulder abduction AROM >/=110 degrees in order to improve ease with donning/doffing jackets. Eval: R Shoulder Abd AROM 85 deg  At DC: Unable to be assessed secondary to patient non-compliance with prescribed HEP  3. Patient will demonstrate R shoulder flexion AROM >/= 145 degrees in order to improve ease with placing objects on high shelves. Eval: R Shoulder Flexion AROM 136 deg  At DC: Unable to be assessed secondary to patient non-compliance with prescribed HEP     Long Term Goals: To be accomplished in 6 weeks:  1. Patient will demonstrate a significant functional improvement as demonstrated by a score of >/=65 on FOTO. Eval: FOTO = 36  At DC: Unable to be assessed secondary to patient non-compliance with prescribed HEP  2. Patient will demonstrate R shoulder functional ER to T1 in order to improve ease with washing her hair. Eval: Not tested.   At DC: Unable to be assessed secondary to patient non-compliance with prescribed HEP  3. Patient will subjectively report a 60% improvement in overall symptoms since SoC in order to improve overall quality of life. Eval: 0%  At DC: Unable to be assessed secondary to patient non-compliance with prescribed HEP  4. Patient will subjectively report pain at worst over the last week 6/10 in order to improve sleep quality. Eval: Pain at Worst = 10/10  At DC: Unable to be assessed secondary to patient non-compliance with prescribed HEP    ASSESSMENT/RECOMMENDATIONS:    Patient evaluated on 7/19/2017 with patient without attendance to any subsequent sessions. In accordance with patient phone call on 8/3/2017 patient subjectively reported resolution of shoulder pain with patient desire to be discharged from care.     [x]Discontinue therapy: []Patient has reached or is progressing toward set goals      [x]Patient is non-compliant or has abdicated      []Due to lack of appreciable progress towards set [address] Rah Ferrer, PT 8/17/2017 1:12 PM

## 2017-08-24 ENCOUNTER — APPOINTMENT (OUTPATIENT)
Dept: PHYSICAL THERAPY | Age: 52
End: 2017-08-24

## 2017-08-24 ENCOUNTER — HOSPITAL ENCOUNTER (OUTPATIENT)
Dept: MAMMOGRAPHY | Age: 52
Discharge: HOME | End: 2017-08-24
Attending: FAMILY MEDICINE
Payer: OTHER GOVERNMENT

## 2017-08-24 DIAGNOSIS — Z12.31 VISIT FOR SCREENING MAMMOGRAM: ICD-10-CM

## 2017-08-24 PROCEDURE — 77063 BREAST TOMOSYNTHESIS BI: CPT

## 2017-08-25 NOTE — PROGRESS NOTES
Noted scattered fibroglandular density but benign changes. For now will follow their recommendations to repeat mammogram yearly.

## 2017-08-30 NOTE — PROGRESS NOTES
Spoke with patient (2 verifiers name/) regarding mammogram noted scattered fibroglandular density but benign changes and for now Dr Shanti Velazco will follow their recommendations to repeat mammogram yearly. Patient voiced understanding.

## 2017-08-31 ENCOUNTER — APPOINTMENT (OUTPATIENT)
Dept: PHYSICAL THERAPY | Age: 52
End: 2017-08-31

## 2017-09-07 ENCOUNTER — APPOINTMENT (OUTPATIENT)
Dept: PHYSICAL THERAPY | Age: 52
End: 2017-09-07

## 2017-10-25 ENCOUNTER — OFFICE VISIT (OUTPATIENT)
Dept: FAMILY MEDICINE CLINIC | Age: 52
End: 2017-10-25

## 2017-10-25 VITALS
HEART RATE: 74 BPM | OXYGEN SATURATION: 98 % | SYSTOLIC BLOOD PRESSURE: 142 MMHG | BODY MASS INDEX: 29.66 KG/M2 | WEIGHT: 161.2 LBS | DIASTOLIC BLOOD PRESSURE: 88 MMHG | HEIGHT: 62 IN | RESPIRATION RATE: 16 BRPM | TEMPERATURE: 97.6 F

## 2017-10-25 DIAGNOSIS — R23.2 HOT FLASHES: ICD-10-CM

## 2017-10-25 DIAGNOSIS — I10 ESSENTIAL HYPERTENSION: ICD-10-CM

## 2017-10-25 DIAGNOSIS — M25.511 RIGHT SHOULDER PAIN, UNSPECIFIED CHRONICITY: Primary | ICD-10-CM

## 2017-10-25 DIAGNOSIS — G47.09 OTHER INSOMNIA: ICD-10-CM

## 2017-10-25 DIAGNOSIS — M79.602 PAIN IN INFERIOR LEFT UPPER EXTREMITY: ICD-10-CM

## 2017-10-25 RX ORDER — NAPROXEN 500 MG/1
500 TABLET ORAL 2 TIMES DAILY WITH MEALS
Qty: 60 TAB | Refills: 1 | Status: SHIPPED | OUTPATIENT
Start: 2017-10-25 | End: 2017-11-22

## 2017-10-25 RX ORDER — TRAMADOL HYDROCHLORIDE AND ACETAMINOPHEN 37.5; 325 MG/1; MG/1
TABLET ORAL
Refills: 0 | Status: CANCELLED | OUTPATIENT
Start: 2017-10-25

## 2017-10-25 RX ORDER — TRAMADOL HYDROCHLORIDE AND ACETAMINOPHEN 37.5; 325 MG/1; MG/1
TABLET ORAL
Refills: 0 | COMMUNITY
Start: 2017-10-05 | End: 2017-10-25

## 2017-10-25 RX ORDER — METHOCARBAMOL 500 MG/1
500 TABLET, FILM COATED ORAL DAILY
COMMUNITY
Start: 2017-10-05 | End: 2017-10-25

## 2017-10-25 RX ORDER — DIPHENHYDRAMINE HCL 25 MG
25 CAPSULE ORAL AS NEEDED
COMMUNITY
End: 2018-12-14

## 2017-10-25 NOTE — MR AVS SNAPSHOT
Visit Information Date & Time Provider Department Dept. Phone Encounter #  
 10/25/2017  2:15 PM Vicenta Moreau, [address] [phone] [id number] Follow-up Instructions Return in about 3 months (around 1/25/2018). Upcoming Health Maintenance Date Due  
 PAP AKA CERVICAL CYTOLOGY 3/3/2018 COLONOSCOPY 4/12/2019 BREAST CANCER SCRN MAMMOGRAM 8/24/2019 DTaP/Tdap/Td series (2 - Td) 3/3/2025 Allergies as of 10/25/2017  Review Complete On: 10/25/2017 By: Vicenta Moreau MD  
 No Known Allergies Current Immunizations  Never Reviewed Name Date Tdap 3/3/2015 10:17 AM  
  
 Not reviewed this visit You Were Diagnosed With   
  
 Codes Comments Right shoulder pain, unspecified chronicity    -  Primary ICD-10-CM: M25.511 ICD-9-CM: 719.41 Other insomnia     ICD-10-CM: G47.09 
ICD-9-CM: 780.52 Essential hypertension     ICD-10-CM: I10 
ICD-9-CM: 401.9 Pain in inferior left upper extremity     ICD-10-CM: Y82.676 ICD-9-CM: 729.5 Hot flashes     ICD-10-CM: R23.2 ICD-9-CM: 782.62 Vitals BP Pulse Temp Resp Height(growth percentile) Weight(growth percentile) 142/88 (BP 1 Location: Left arm, BP Patient Position: Sitting) 74 97.6 °F (36.4 °C) (Oral) 16 5' 2\" (1.575 m) 161 lb 3.2 oz (73.1 kg) LMP SpO2 BMI OB Status Smoking Status 07/24/2017 98% 29.48 kg/m2 Having regular periods Never Smoker Vitals History BMI and BSA Data Body Mass Index Body Surface Area  
 29.48 kg/m 2 1.79 m 2 Preferred Pharmacy Pharmacy Name Phone [id number] [address] [phone] Your Updated Medication List  
  
   
This list is accurate as of: 10/25/17  2:51 PM.  Always use your most recent med list. amLODIPine 5 mg tablet Commonly known as:  Kristi Prince TAKE ONE TABLET BY MOUTH ONE TIME DAILY  
  
 cholecalciferol 1,000 unit Cap Commonly known as:  VITAMIN D3 Take 1,000 Units by mouth two (2) times a day. diphenhydrAMINE 25 mg capsule Commonly known as:  BENADRYL 25 mg. IRON PO Take 325 mg by mouth three (3) times daily. MULTIVITAMIN WITH IRON PO Take 1 Tab by mouth daily. naproxen 500 mg tablet Commonly known as:  NAPROSYN Take 1 Tab by mouth two (2) times daily (with meals). traZODone 50 mg tablet Commonly known as:  Olivier Proctor Take 1 Tab by mouth nightly. Prescriptions Sent to Pharmacy Refills  
 naproxen (NAPROSYN) 500 mg tablet 1 Sig: Take 1 Tab by mouth two (2) times daily (with meals). Class: Normal  
 Pharmacy: [id number] [id number] [place] [address] #: [phone] Route: Oral  
  
Follow-up Instructions Return in about 3 months (around 1/25/2018). Patient Instructions Hot Flashes During Menopause: Care Instructions Your Care Instructions A hot flash is a sudden feeling of intense body heat. Your head, neck, and chest may get red. Your heartbeat may speed up, and you may feel anxious or irritable. You may find that hot flashes occur more often in warm rooms or during stressful times. Hot flashes and other symptoms are a normal response to the hormone changes that occur before your menstrual cycle goes away completely (menopause). Hot flashes often get better and go away with time. Making a few changes, such as exercising more, practicing meditation, quitting smoking, and drinking less alcohol, can help. Some women take hormone pills or other medicine to treat bothersome symptoms. Follow-up care is a key part of your treatment and safety. Be sure to make and go to all appointments, and call your doctor if you are having problems. Its also a good idea to know your test results and keep a list of the medicines you take. How can you care for yourself at home? · If you decide to take medicine to treat hot flashes, take it exactly as prescribed. Call your doctor if you think you are having a problem with your medicine. You will get more details on the specific medicine your doctor prescribes. · Learn to meditate. Sit quietly and focus on your breathing. Try to practice each day. Books, classes, and tapes can help you start a program. 
· Wear natural fabrics, such as cotton and silk. Dress in layers so you can take off clothes as needed. · Keep the room temperature cool, or use a fan. You are more likely to have a hot flash when you are too warm than when you are cool. · Use fewer blankets when you sleep at night. · Drink cold fluids rather than hot ones. Limit your intake of caffeine and alcohol. · Eat smaller meals more often during the day so your body makes less heat than when digesting large amounts of food. Eat low-fat and high-fiber foods. · Do not smoke. Smoking can make hot flashes worse. If you need help quitting, talk to your doctor about stop-smoking programs and medicines. These can increase your chances of quitting for good. · Get at least 30 minutes of exercise on most days of the week. Walking is a good choice. You also may want to do other activities, such as running, swimming, cycling, or playing tennis or team sports. Where can you learn more? Go to http://kings-junito.info/. Enter F700 in the search box to learn more about \"Hot Flashes During Menopause: Care Instructions. \" Current as of: October 13, 2016 Content Version: 11.3 © 6225-4287 Healthwise, Incorporated. Care instructions adapted under license by [employer] (which disclaims liability or warranty for this information). If you have questions about a medical condition or this instruction, always ask your healthcare professional. [address] any warranty or liability for your use of this information. Learning About Sleeping Well What does sleeping well mean? Sleeping well means getting enough sleep. How much sleep is enough varies among people. The number of hours you sleep is not as important as how you feel when you wake up. If you do not feel refreshed, you probably need more sleep. Another sign of not getting enough sleep is feeling tired during the day. The average total nightly sleep time is 7½ to 8 hours. Healthy adults may need a little more or a little less than this. Why is getting enough sleep important? Getting enough quality sleep is a basic part of good health. When your sleep suffers, your mood and your thoughts can suffer too. You may find yourself feeling more grumpy or stressed. Not getting enough sleep also can lead to serious problems, including injury, accidents, anxiety, and depression. What might cause poor sleeping? Many things can cause sleep problems, including: · Stress. Stress can be caused by fear about a single event, such as giving a speech. Or you may have ongoing stress, such as worry about work or school. · Depression, anxiety, and other mental or emotional conditions. · Changes in your sleep habits or surroundings. This includes changes that happen where you sleep, such as noise, light, or sleeping in a different bed. It also includes changes in your sleep pattern, such as having jet lag or working a late shift. · Health problems, such as pain, breathing problems, and restless legs syndrome. · Lack of regular exercise. How can you help yourself? Here are some tips that may help you sleep more soundly and wake up feeling more refreshed. Your sleeping area · Use your bedroom only for sleeping and sex. A bit of light reading may help you fall asleep. But if it doesn't, do your reading elsewhere in the house. Don't watch TV in bed. · Be sure your bed is big enough to stretch out comfortably, especially if you have a sleep partner. · Keep your bedroom quiet, dark, and cool. Use curtains, blinds, or a sleep mask to block out light. To block out noise, use earplugs, soothing music, or a \"white noise\" machine. Your evening and bedtime routine · Create a relaxing bedtime routine. You might want to take a warm shower or bath, listen to soothing music, or drink a cup of noncaffeinated tea. · Go to bed at the same time every night. And get up at the same time every morning, even if you feel tired. What to avoid · Limit caffeine (coffee, tea, caffeinated sodas) during the day, and don't have any for at least 4 to 6 hours before bedtime. · Don't drink alcohol before bedtime. Alcohol can cause you to wake up more often during the night. · Don't smoke or use tobacco, especially in the evening. Nicotine can keep you awake. · Don't take naps during the day, especially close to bedtime. · Don't lie in bed awake for too long. If you can't fall asleep, or if you wake up in the middle of the night and can't get back to sleep within 15 minutes or so, get out of bed and go to another room until you feel sleepy. · Don't take medicine right before bed that may keep you awake or make you feel hyper or energized. Your doctor can tell you if your medicine may do this and if you can take it earlier in the day. If you can't sleep · Imagine yourself in a peaceful, pleasant scene. Focus on the details and feelings of being in a place that is relaxing. · Get up and do a quiet or boring activity until you feel sleepy. · Don't drink any liquids after 6 p.m. if you wake up often because you have to go to the bathroom. Where can you learn more? Go to http://kings-junito.info/. Enter S543 in the search box to learn more about \"Learning About Sleeping Well. \" Current as of: July 26, 2016 Content Version: 11.3 © 3548-1484 [employer], [employer].  Care instructions adapted under license by [facility] [address] (which disclaims liability or warranty for this information). If you have questions about a medical condition or this instruction, always ask your healthcare professional. [address] any warranty or liability for your use of this information. Low Sodium Diet (2,000 Milligram): Care Instructions Your Care Instructions Too much sodium causes your body to hold on to extra water. This can raise your blood pressure and force your heart and kidneys to work harder. In very serious cases, this could cause you to be put in the hospital. It might even be life-threatening. By limiting sodium, you will feel better and lower your risk of serious problems. The most common source of sodium is salt. People get most of the salt in their diet from canned, prepared, and packaged foods. Fast food and restaurant meals also are very high in sodium. Your doctor will probably limit your sodium to less than 2,000 milligrams (mg) a day. This limit counts all the sodium in prepared and packaged foods and any salt you add to your food. Follow-up care is a key part of your treatment and safety. Be sure to make and go to all appointments, and call your doctor if you are having problems. It's also a good idea to know your test results and keep a list of the medicines you take. How can you care for yourself at home? Read food labels · Read labels on cans and food packages. The labels tell you how much sodium is in each serving. Make sure that you look at the serving size. If you eat more than the serving size, you have eaten more sodium. · Food labels also tell you the Percent Daily Value for sodium. Choose products with low Percent Daily Values for sodium. · Be aware that sodium can come in forms other than salt, including monosodium glutamate (MSG), sodium citrate, and sodium bicarbonate (baking soda). MSG is often added to Asian food.  When you eat out, you can sometimes ask for food without MSG or added salt. Buy low-sodium foods · Buy foods that are labeled \"unsalted\" (no salt added), \"sodium-free\" (less than 5 mg of sodium per serving), or \"low-sodium\" (less than 140 mg of sodium per serving). Foods labeled \"reduced-sodium\" and \"light sodium\" may still have too much sodium. Be sure to read the label to see how much sodium you are getting. · Buy fresh vegetables, or frozen vegetables without added sauces. Buy low-sodium versions of canned vegetables, soups, and other canned goods. Prepare low-sodium meals · Cut back on the amount of salt you use in cooking. This will help you adjust to the taste. Do not add salt after cooking. One teaspoon of salt has about 2,300 mg of sodium. · Take the salt shaker off the table. · Flavor your food with garlic, lemon juice, onion, vinegar, herbs, and spices. Do not use soy sauce, lite soy sauce, steak sauce, onion salt, garlic salt, celery salt, mustard, or ketchup on your food. · Use low-sodium salad dressings, sauces, and ketchup. Or make your own salad dressings and sauces without adding salt. · Use less salt (or none) when recipes call for it. You can often use half the salt a recipe calls for without losing flavor. Other foods such as rice, pasta, and grains do not need added salt. · Rinse canned vegetables, and cook them in fresh water. This removes somebut not allof the salt. · Avoid water that is naturally high in sodium or that has been treated with water softeners, which add sodium. Call your local water company to find out the sodium content of your water supply. If you buy bottled water, read the label and choose a sodium-free brand. Avoid high-sodium foods · Avoid eating: ¨ Smoked, cured, salted, and canned meat, fish, and poultry. ¨ Ham, ravi, hot dogs, and luncheon meats. ¨ Regular, hard, and processed cheese and regular peanut butter.  
¨ Crackers with salted tops, and other salted snack foods such as pretzels, chips, and salted popcorn. ¨ Frozen prepared meals, unless labeled low-sodium. ¨ Canned and dried soups, broths, and bouillon, unless labeled sodium-free or low-sodium. ¨ Canned vegetables, unless labeled sodium-free or low-sodium. ¨ [place] [employer] fries, pizza, tacos, and other fast foods. ¨ Pickles, olives, ketchup, and other condiments, especially soy sauce, unless labeled sodium-free or low-sodium. Where can you learn more? Go to http://kingsLantos Technologiesjunito.info/. Enter P798 in the search box to learn more about \"Low Sodium Diet (2,000 Milligram): Care Instructions. \" Current as of: July 26, 2016 Content Version: 11.3 © 3674-5289 [employer]. Care instructions adapted under license by [employer] (which disclaims liability or warranty for this information). If you have questions about a medical condition or this instruction, always ask your healthcare professional. [place] [address] any warranty or liability for your use of this information. Introducing [facility]! Heike Carrillo introduces [employer] patient portal. Now you can access parts of your medical record, email your doctor's office, and request medication refills online. 1. In your internet browser, go to [email or web address] 2. Click on the First Time User? Click Here link in the Sign In box. You will see the New Member Sign Up page. 3. Enter your [employer] Access Code exactly as it appears below. You will not need to use this code after youve completed the sign-up process. If you do not sign up before the expiration date, you must request a new code. · [employer] Access Code: [email or web address] Expires: 1/23/2018  2:51 PM 
 
4. Enter the last four digits of your Social Security Number (xxxx) and Date of Birth (mm/dd/yyyy) as indicated and click Submit. You will be taken to the next sign-up page. 5. Create a [employer] ID. This will be your [employer] login ID and cannot be changed, so think of one that is secure and easy to remember. 6. Create a [employer] password. You can change your password at any time. 7. Enter your Password Reset Question and Answer. This can be used at a later time if you forget your password. 8. Enter your e-mail address. You will receive e-mail notification when new information is available in [address] 9. Click Sign Up. You can now view and download portions of your medical record. 10. Click the Download Summary menu link to download a portable copy of your medical information. If you have questions, please visit the Frequently Asked Questions section of the [employer] website. Remember, [employer] is NOT to be used for urgent needs. For medical emergencies, dial 911. Now available from your iPhone and Android! Please provide this summary of care documentation to your next provider. Your primary care clinician is listed as Marcus Hackett. If you have any questions after today's visit, please call [phone].

## 2017-10-25 NOTE — PATIENT INSTRUCTIONS
Hot Flashes During Menopause: Care Instructions  Your Care Instructions  A hot flash is a sudden feeling of intense body heat. Your head, neck, and chest may get red. Your heartbeat may speed up, and you may feel anxious or irritable. You may find that hot flashes occur more often in warm rooms or during stressful times. Hot flashes and other symptoms are a normal response to the hormone changes that occur before your menstrual cycle goes away completely (menopause). Hot flashes often get better and go away with time. Making a few changes, such as exercising more, practicing meditation, quitting smoking, and drinking less alcohol, can help. Some women take hormone pills or other medicine to treat bothersome symptoms. Follow-up care is a key part of your treatment and safety. Be sure to make and go to all appointments, and call your doctor if you are having problems. Its also a good idea to know your test results and keep a list of the medicines you take. How can you care for yourself at home? · If you decide to take medicine to treat hot flashes, take it exactly as prescribed. Call your doctor if you think you are having a problem with your medicine. You will get more details on the specific medicine your doctor prescribes. · Learn to meditate. Sit quietly and focus on your breathing. Try to practice each day. Books, classes, and tapes can help you start a program.  · Wear natural fabrics, such as cotton and silk. Dress in layers so you can take off clothes as needed. · Keep the room temperature cool, or use a fan. You are more likely to have a hot flash when you are too warm than when you are cool. · Use fewer blankets when you sleep at night. · Drink cold fluids rather than hot ones. Limit your intake of caffeine and alcohol. · Eat smaller meals more often during the day so your body makes less heat than when digesting large amounts of food. Eat low-fat and high-fiber foods. · Do not smoke.  Smoking can make hot flashes worse. If you need help quitting, talk to your doctor about stop-smoking programs and medicines. These can increase your chances of quitting for good. · Get at least 30 minutes of exercise on most days of the week. Walking is a good choice. You also may want to do other activities, such as running, swimming, cycling, or playing tennis or team sports. Where can you learn more? Go to http://kings-junito.info/. Enter F700 in the search box to learn more about \"Hot Flashes During Menopause: Care Instructions. \"  Current as of: October 13, 2016  Content Version: 11.3  © 5717-9091 [employer]. Care instructions adapted under license by [employer] (which disclaims liability or warranty for this information). If you have questions about a medical condition or this instruction, always ask your healthcare professional. [place] [address] any warranty or liability for your use of this information. Learning About Sleeping Well  What does sleeping well mean? Sleeping well means getting enough sleep. How much sleep is enough varies among people. The number of hours you sleep is not as important as how you feel when you wake up. If you do not feel refreshed, you probably need more sleep. Another sign of not getting enough sleep is feeling tired during the day. The average total nightly sleep time is 7½ to 8 hours. Healthy adults may need a little more or a little less than this. Why is getting enough sleep important? Getting enough quality sleep is a basic part of good health. When your sleep suffers, your mood and your thoughts can suffer too. You may find yourself feeling more grumpy or stressed. Not getting enough sleep also can lead to serious problems, including injury, accidents, anxiety, and depression. What might cause poor sleeping? Many things can cause sleep problems, including:  · Stress.  Stress can be caused by fear about a single event, such as giving a speech. Or you may have ongoing stress, such as worry about work or school. · Depression, anxiety, and other mental or emotional conditions. · Changes in your sleep habits or surroundings. This includes changes that happen where you sleep, such as noise, light, or sleeping in a different bed. It also includes changes in your sleep pattern, such as having jet lag or working a late shift. · Health problems, such as pain, breathing problems, and restless legs syndrome. · Lack of regular exercise. How can you help yourself? Here are some tips that may help you sleep more soundly and wake up feeling more refreshed. Your sleeping area  · Use your bedroom only for sleeping and sex. A bit of light reading may help you fall asleep. But if it doesn't, do your reading elsewhere in the house. Don't watch TV in bed. · Be sure your bed is big enough to stretch out comfortably, especially if you have a sleep partner. · Keep your bedroom quiet, dark, and cool. Use curtains, blinds, or a sleep mask to block out light. To block out noise, use earplugs, soothing music, or a \"white noise\" machine. Your evening and bedtime routine  · Create a relaxing bedtime routine. You might want to take a warm shower or bath, listen to soothing music, or drink a cup of noncaffeinated tea. · Go to bed at the same time every night. And get up at the same time every morning, even if you feel tired. What to avoid  · Limit caffeine (coffee, tea, caffeinated sodas) during the day, and don't have any for at least 4 to 6 hours before bedtime. · Don't drink alcohol before bedtime. Alcohol can cause you to wake up more often during the night. · Don't smoke or use tobacco, especially in the evening. Nicotine can keep you awake. · Don't take naps during the day, especially close to bedtime. · Don't lie in bed awake for too long.  If you can't fall asleep, or if you wake up in the middle of the night and can't get back to sleep within 15 minutes or so, get out of bed and go to another room until you feel sleepy. · Don't take medicine right before bed that may keep you awake or make you feel hyper or energized. Your doctor can tell you if your medicine may do this and if you can take it earlier in the day. If you can't sleep  · Imagine yourself in a peaceful, pleasant scene. Focus on the details and feelings of being in a place that is relaxing. · Get up and do a quiet or boring activity until you feel sleepy. · Don't drink any liquids after 6 p.m. if you wake up often because you have to go to the bathroom. Where can you learn more? Go to http://kings-junito.info/. Enter G420 in the search box to learn more about \"Learning About Sleeping Well. \"  Current as of: July 26, 2016  Content Version: 11.3  © 0531-7987 [employer]. Care instructions adapted under license by [employer] (which disclaims liability or warranty for this information). If you have questions about a medical condition or this instruction, always ask your healthcare professional. [place] [address] any warranty or liability for your use of this information. Low Sodium Diet (2,000 Milligram): Care Instructions  Your Care Instructions  Too much sodium causes your body to hold on to extra water. This can raise your blood pressure and force your heart and kidneys to work harder. In very serious cases, this could cause you to be put in the hospital. It might even be life-threatening. By limiting sodium, you will feel better and lower your risk of serious problems. The most common source of sodium is salt. People get most of the salt in their diet from canned, prepared, and packaged foods. Fast food and restaurant meals also are very high in sodium. Your doctor will probably limit your sodium to less than 2,000 milligrams (mg) a day.  This limit counts all the sodium in prepared and packaged foods and any salt you add to your food. Follow-up care is a key part of your treatment and safety. Be sure to make and go to all appointments, and call your doctor if you are having problems. It's also a good idea to know your test results and keep a list of the medicines you take. How can you care for yourself at home? Read food labels  · Read labels on cans and food packages. The labels tell you how much sodium is in each serving. Make sure that you look at the serving size. If you eat more than the serving size, you have eaten more sodium. · Food labels also tell you the Percent Daily Value for sodium. Choose products with low Percent Daily Values for sodium. · Be aware that sodium can come in forms other than salt, including monosodium glutamate (MSG), sodium citrate, and sodium bicarbonate (baking soda). MSG is often added to Asian food. When you eat out, you can sometimes ask for food without MSG or added salt. Buy low-sodium foods  · Buy foods that are labeled \"unsalted\" (no salt added), \"sodium-free\" (less than 5 mg of sodium per serving), or \"low-sodium\" (less than 140 mg of sodium per serving). Foods labeled \"reduced-sodium\" and \"light sodium\" may still have too much sodium. Be sure to read the label to see how much sodium you are getting. · Buy fresh vegetables, or frozen vegetables without added sauces. Buy low-sodium versions of canned vegetables, soups, and other canned goods. Prepare low-sodium meals  · Cut back on the amount of salt you use in cooking. This will help you adjust to the taste. Do not add salt after cooking. One teaspoon of salt has about 2,300 mg of sodium. · Take the salt shaker off the table. · Flavor your food with garlic, lemon juice, onion, vinegar, herbs, and spices. Do not use soy sauce, lite soy sauce, steak sauce, onion salt, garlic salt, celery salt, mustard, or ketchup on your food. · Use low-sodium salad dressings, sauces, and ketchup.  Or make your own salad dressings and sauces without adding salt. · Use less salt (or none) when recipes call for it. You can often use half the salt a recipe calls for without losing flavor. Other foods such as rice, pasta, and grains do not need added salt. · Rinse canned vegetables, and cook them in fresh water. This removes some--but not all--of the salt. · Avoid water that is naturally high in sodium or that has been treated with water softeners, which add sodium. Call your local water company to find out the sodium content of your water supply. If you buy bottled water, read the label and choose a sodium-free brand. Avoid high-sodium foods  · Avoid eating:  ¨ Smoked, cured, salted, and canned meat, fish, and poultry. ¨ Ham, ravi, hot dogs, and luncheon meats. ¨ Regular, hard, and processed cheese and regular peanut butter. ¨ Crackers with salted tops, and other salted snack foods such as pretzels, chips, and salted popcorn. ¨ Frozen prepared meals, unless labeled low-sodium. ¨ Canned and dried soups, broths, and bouillon, unless labeled sodium-free or low-sodium. ¨ Canned vegetables, unless labeled sodium-free or low-sodium. ¨ Western Dina fries, pizza, tacos, and other fast foods. ¨ Pickles, olives, ketchup, and other condiments, especially soy sauce, unless labeled sodium-free or low-sodium. Where can you learn more? Go to http://kings-junito.info/. Enter Q811 in the search box to learn more about \"Low Sodium Diet (2,000 Milligram): Care Instructions. \"  Current as of: July 26, 2016  Content Version: 11.3  © 7662-7210 [employer]. Care instructions adapted under license by [employer] (which disclaims liability or warranty for this information). If you have questions about a medical condition or this instruction, always ask your healthcare professional. [address]  any warranty or liability for your use of this information.

## 2017-10-25 NOTE — PROGRESS NOTES
HISTORY OF PRESENT ILLNESS  Juanito Lloyd is a 47 y.o. female. HPI: here for follow up on rt shoulder pain. Limited ROM. Sent her to PT and it improved significantly. Now has localized rt upper arm pain. No radiation of pain. Mild in intensity. No weakness of upper ext. Improved ROM over rt shoulder. No neck pain. Also had an Er visit at [facility]. Review records by care everywhere. Shoulder x-ray was negative. Was pulling pt day before at night she felt she can not move the arm and she had pain over left upper ext. She went to ER. Was given pain medication and it has been helping her a lot. Now no more pain and full RoM over left shoulder. No concern at this time. Also last visit had trouble sleeping. She was given trazodone. Said helping but she is taking only as needed. No mood changes. Said she feels hot flashes. Not taking any medication currently. Also h/o hypertension. On medication. Shows compliance. Mild elevated blood pressure. Asymptomatic. Denies any headache, dizziness, no chest pain or trouble breathing, no arm or leg weakness. No nausea or vomiting, no weight or appetite changes. No urine or bowel complains, no palpitation, no diaphoresis. Review medication list, vitals, problem list,allergies. Visit Vitals    /88 (BP 1 Location: Left arm, BP Patient Position: Sitting)    Pulse 74    Temp 97.6 °F (36.4 °C) (Oral)    Resp 16    Ht 5' 2\" (1.575 m)    Wt 161 lb 3.2 oz (73.1 kg)    SpO2 98%    BMI 29.48 kg/m2       ROS: see HPI     Physical Exam   Constitutional: She is oriented to person, place, and time. No distress. Cardiovascular: Normal rate, regular rhythm and normal heart sounds. Pulmonary/Chest:   CTA   Abdominal: Soft. Bowel sounds are normal. There is no tenderness. Musculoskeletal: She exhibits no edema. Localized tenderness over rt upper arm over anterior site. No swelling or redness. ROM over rt shoulder wnl. Left shoulder: ROM wnl.  No point tenderness. No cervical spine tenderness    Neurological: She is oriented to person, place, and time. Psychiatric: Her behavior is normal.       ASSESSMENT and PLAN    ICD-10-CM ICD-9-CM    1. Right shoulder pain, unspecified chronicity: improved with PT. Localized upper arm pain. For now heating pad. Naproxen with food as needed. M25.511 719.41 naproxen (NAPROSYN) 500 mg tablet   2. Other insomnia: following sleep hygiene. For now using trazodone only as needed. Will observe. G47.09 780.52    3. Essential hypertension: borderline elevated. Asymptomatic. For now low salt diet and observe. I10 401.9    4. Pain in inferior left upper extremity/ had ER visit. pain medication helped. improved significantly ; for now symptomatic treatment. Heating pad. Naproxen as needed. M79.602 729.5 naproxen (NAPROSYN) 500 mg tablet   5. Hot flashes: giving trail of black cohosh. R23.2 782.62    Pt understood and agree with the plan   Review     Follow-up Disposition:  Return in about 3 months (around 1/25/2018).

## 2017-10-25 NOTE — PROGRESS NOTES
1. Have you been to the ER, urgent care clinic since your last visit? Hospitalized since your last visit? [facility] 10/05/17 left shoulder pain/torn rotator cuff    2. Have you seen or consulted any other health care providers outside of the [address] [place] [address] since your last visit? Include any pap smears or colon screening.  No

## 2017-11-02 RX ORDER — AMLODIPINE BESYLATE 5 MG/1
TABLET ORAL
Qty: 90 TAB | Refills: 0 | Status: SHIPPED | OUTPATIENT
Start: 2017-11-02 | End: 2018-02-01 | Stop reason: SDUPTHER

## 2017-11-22 ENCOUNTER — APPOINTMENT (OUTPATIENT)
Dept: GENERAL RADIOLOGY | Age: 52
End: 2017-11-22
Attending: PHYSICIAN ASSISTANT
Payer: OTHER GOVERNMENT

## 2017-11-22 ENCOUNTER — HOSPITAL ENCOUNTER (EMERGENCY)
Age: 52
Discharge: HOME | End: 2017-11-22
Attending: EMERGENCY MEDICINE | Admitting: EMERGENCY MEDICINE
Payer: OTHER GOVERNMENT

## 2017-11-22 VITALS
OXYGEN SATURATION: 98 % | SYSTOLIC BLOOD PRESSURE: 154 MMHG | HEIGHT: 62 IN | WEIGHT: 162 LBS | DIASTOLIC BLOOD PRESSURE: 86 MMHG | TEMPERATURE: 98.6 F | RESPIRATION RATE: 18 BRPM | BODY MASS INDEX: 29.81 KG/M2 | HEART RATE: 87 BPM

## 2017-11-22 DIAGNOSIS — S46.912A ELBOW STRAIN, LEFT, INITIAL ENCOUNTER: ICD-10-CM

## 2017-11-22 DIAGNOSIS — S86.911A KNEE STRAIN, RIGHT, INITIAL ENCOUNTER: Primary | ICD-10-CM

## 2017-11-22 PROCEDURE — 73080 X-RAY EXAM OF ELBOW: CPT

## 2017-11-22 PROCEDURE — 73560 X-RAY EXAM OF KNEE 1 OR 2: CPT

## 2017-11-22 PROCEDURE — 74011250637 HC RX REV CODE- 250/637: Performed by: PHYSICIAN ASSISTANT

## 2017-11-22 PROCEDURE — 99283 EMERGENCY DEPT VISIT LOW MDM: CPT

## 2017-11-22 RX ORDER — HYDROCODONE BITARTRATE AND ACETAMINOPHEN 5; 325 MG/1; MG/1
1 TABLET ORAL
Status: COMPLETED | OUTPATIENT
Start: 2017-11-22 | End: 2017-11-22

## 2017-11-22 RX ORDER — NAPROXEN 500 MG/1
500 TABLET ORAL 2 TIMES DAILY WITH MEALS
Qty: 20 TAB | Refills: 0 | Status: SHIPPED | OUTPATIENT
Start: 2017-11-22 | End: 2018-06-15 | Stop reason: SDUPTHER

## 2017-11-22 RX ORDER — CYCLOBENZAPRINE HCL 5 MG
5 TABLET ORAL
Qty: 7 TAB | Refills: 0 | Status: SHIPPED | OUTPATIENT
Start: 2017-11-22 | End: 2018-12-20

## 2017-11-22 RX ADMIN — HYDROCODONE BITARTRATE AND ACETAMINOPHEN 1 TABLET: 5; 325 TABLET ORAL at 20:04

## 2017-11-23 NOTE — ED TRIAGE NOTES
C/o right leg pain x 3 hours after sitting \"for a while\" with her legs crossed. States also having left elbow pain that started at the same time but has improved. States right leg pain has not improved after taking a Naproxen 1 hr ago.

## 2017-11-23 NOTE — ED NOTES
Pt discharge home in stable conditino with verbal and written instructions about medication and follow up.

## 2017-11-23 NOTE — ED PROVIDER NOTES
HPI Comments: Tracey Ga is a 47 y.o. female that presents to the ED with a complaint of knee and elbow pain x3 hours. Pt states that pain started while sititng in a chair in her hair dressers. After sitting for 30 minutes or she she began to have intense 10/10 pain in her right knee. Pt has been unable to move leg without pain since that time. NO other complaints at this time    Patient is a 47 y.o. female presenting with leg pain and elbow pain. Leg Pain      Elbow Pain           Past Medical History:   Diagnosis Date    Anemia     Hypertension        Past Surgical History:   Procedure Laterality Date    HX APPENDECTOMY           Family History:   Problem Relation Age of Onset    Breast Cancer Mother     Hypertension Mother     Diabetes Mother     Heart Failure Mother     Breast Cancer Sister     Breast Cancer Sister        Social History     Social History    Marital status:      Spouse name: N/A    Number of children: N/A    Years of education: N/A     Occupational History    Not on file. Social History Main Topics    Smoking status: Never Smoker    Smokeless tobacco: Never Used    Alcohol use 0.0 oz/week     0 Standard drinks or equivalent per week      Comment: occassionally    Drug use: No    Sexual activity: Yes     Partners: Male     Other Topics Concern    Not on file     Social History Narrative         ALLERGIES: Review of patient's allergies indicates no known allergies. Review of Systems   Constitutional: Negative for fatigue and fever. Respiratory: Negative for cough and shortness of breath. Cardiovascular: Negative for chest pain and leg swelling. Gastrointestinal: Negative for diarrhea, nausea and vomiting. Genitourinary: Negative for difficulty urinating. Musculoskeletal: Positive for arthralgias and myalgias. Skin: Negative for color change and wound. All other systems reviewed and are negative.       Vitals:    11/22/17 1938   BP: 154/86 Pulse: 87   Resp: 18   Temp: 98.6 °F (37 °C)   SpO2: 98%   Weight: 73.5 kg (162 lb)   Height: 5' 2\" (1.575 m)            Physical Exam   Constitutional: She is oriented to person, place, and time. She appears well-developed and well-nourished. No distress. HENT:   Head: Normocephalic and atraumatic. Nose: Nose normal.   Eyes: Conjunctivae are normal. Pupils are equal, round, and reactive to light. Cardiovascular: Normal rate, regular rhythm and normal heart sounds. Pulmonary/Chest: Effort normal and breath sounds normal. No respiratory distress. Musculoskeletal:        Left elbow: She exhibits normal range of motion, no swelling, no effusion, no deformity and no laceration. Tenderness found. Medial epicondyle and lateral epicondyle tenderness noted. Right knee: She exhibits normal range of motion, no swelling, no effusion, no ecchymosis, no deformity, no laceration, no erythema, normal alignment, no LCL laxity, normal patellar mobility, no bony tenderness, normal meniscus and no MCL laxity. Tenderness found. Medial joint line tenderness noted. FUll PROM  PT refused AROM stating it is too painful   Neurological: She is oriented to person, place, and time. Skin: Skin is warm and dry. No erythema. Psychiatric: She has a normal mood and affect. Her behavior is normal.   Vitals reviewed.        MDM  Number of Diagnoses or Management Options  Diagnosis management comments: Xray: negative for acute process    Impression: Knee strain, elbow strain    Plan: discharge home  Anti inflammatory medicatios and muscle relaxants  Follow up wiith PCP       Amount and/or Complexity of Data Reviewed  Tests in the radiology section of CPT®: ordered and reviewed    Risk of Complications, Morbidity, and/or Mortality  Presenting problems: low  Diagnostic procedures: low  Management options: low    Patient Progress  Patient progress: stable    ED Course       Procedures             Vitals:  Patient Vitals for the past 12 hrs:   Temp Pulse Resp BP SpO2   11/22/17 1938 98.6 °F (37 °C) 87 18 154/86 98 %         Medications ordered:   Medications   HYDROcodone-acetaminophen (NORCO) 5-325 mg per tablet 1 Tab (1 Tab Oral Given 11/22/17 2004)         Lab findings:  No results found for this or any previous visit (from the past 12 hour(s)). X-Ray, CT or other radiology findings or impressions:  XR KNEE RT MAX 2 VWS    (Results Pending)   XR ELBOW LT MIN 3 V    (Results Pending)       Progress notes, Consult notes or additional Procedure notes:       Disposition:  Diagnosis: No diagnosis found. Disposition: discharge    Follow-up Information     None           Patient's Medications   Start Taking    No medications on file   Continue Taking    AMLODIPINE (NORVASC) 5 MG TABLET    TAKE ONE TABLET BY MOUTH ONE TIME DAILY     CHOLECALCIFEROL, VITAMIN D3, 1,000 UNIT CAP    Take 1,000 Units by mouth two (2) times a day. DIPHENHYDRAMINE (BENADRYL) 25 MG CAPSULE    25 mg. FERROUS FUMARATE (IRON PO)    Take 325 mg by mouth three (3) times daily. MULTIVITAMIN WITH IRON PO    Take 1 Tab by mouth daily. NAPROXEN (NAPROSYN) 500 MG TABLET    Take 1 Tab by mouth two (2) times daily (with meals). TRAZODONE (DESYREL) 50 MG TABLET    Take 1 Tab by mouth nightly.    These Medications have changed    No medications on file   Stop Taking    No medications on file

## 2018-02-01 NOTE — TELEPHONE ENCOUNTER
This pharmacy faxed over request for the following prescriptions to be filled:    Medication requested :   Requested Prescriptions     Pending Prescriptions Disp Refills    amLODIPine (NORVASC) 5 mg tablet 90 Tab 0     PCP: Ivy Alvarez or [facility]   Mail order or Local pharmacy [id number]     Scheduled appointment if not seen by current providers in office: [address] [place] [address] Avenue 10/25/2017 tried to make f/u appt and pt will call when she has her work schedule.  Due 1/26/2018

## 2018-02-02 RX ORDER — AMLODIPINE BESYLATE 5 MG/1
5 TABLET ORAL DAILY
Qty: 90 TAB | Refills: 0 | Status: SHIPPED | OUTPATIENT
Start: 2018-02-02 | End: 2018-02-16 | Stop reason: SDUPTHER

## 2018-02-16 ENCOUNTER — OFFICE VISIT (OUTPATIENT)
Dept: FAMILY MEDICINE CLINIC | Age: 53
End: 2018-02-16

## 2018-02-16 ENCOUNTER — HOSPITAL ENCOUNTER (OUTPATIENT)
Dept: GENERAL RADIOLOGY | Age: 53
Discharge: HOME | End: 2018-02-16
Payer: OTHER GOVERNMENT

## 2018-02-16 ENCOUNTER — HOSPITAL ENCOUNTER (OUTPATIENT)
Dept: LAB | Age: 53
Discharge: HOME | End: 2018-02-16
Payer: OTHER GOVERNMENT

## 2018-02-16 VITALS
OXYGEN SATURATION: 98 % | HEIGHT: 62 IN | BODY MASS INDEX: 30.22 KG/M2 | DIASTOLIC BLOOD PRESSURE: 88 MMHG | SYSTOLIC BLOOD PRESSURE: 138 MMHG | TEMPERATURE: 97.8 F | RESPIRATION RATE: 16 BRPM | WEIGHT: 164.2 LBS | HEART RATE: 80 BPM

## 2018-02-16 DIAGNOSIS — M25.541 ARTHRALGIA OF BOTH HANDS: ICD-10-CM

## 2018-02-16 DIAGNOSIS — M25.542 ARTHRALGIA OF BOTH HANDS: ICD-10-CM

## 2018-02-16 DIAGNOSIS — M25.511 RIGHT SHOULDER PAIN, UNSPECIFIED CHRONICITY: Primary | ICD-10-CM

## 2018-02-16 DIAGNOSIS — I10 ESSENTIAL HYPERTENSION: ICD-10-CM

## 2018-02-16 DIAGNOSIS — G47.9 SLEEP TROUBLE: ICD-10-CM

## 2018-02-16 LAB
ALBUMIN SERPL-MCNC: 4.1 G/DL (ref 3.4–5)
ALBUMIN/GLOB SERPL: 1.2 {RATIO} (ref 0.8–1.7)
ALP SERPL-CCNC: 93 U/L (ref 45–117)
ALT SERPL-CCNC: 26 U/L (ref 13–56)
ANION GAP SERPL CALC-SCNC: 6 MMOL/L (ref 3–18)
AST SERPL-CCNC: 20 U/L (ref 15–37)
BILIRUB SERPL-MCNC: 0.3 MG/DL (ref 0.2–1)
BUN SERPL-MCNC: 9 MG/DL (ref 7–18)
BUN/CREAT SERPL: 17 (ref 12–20)
CALCIUM SERPL-MCNC: 9.2 MG/DL (ref 8.5–10.1)
CHLORIDE SERPL-SCNC: 103 MMOL/L (ref 100–108)
CO2 SERPL-SCNC: 31 MMOL/L (ref 21–32)
CREAT SERPL-MCNC: 0.53 MG/DL (ref 0.6–1.3)
ERYTHROCYTE [SEDIMENTATION RATE] IN BLOOD: 40 MM/HR (ref 0–30)
GLOBULIN SER CALC-MCNC: 3.3 G/DL (ref 2–4)
GLUCOSE SERPL-MCNC: 93 MG/DL (ref 74–99)
POTASSIUM SERPL-SCNC: 3.9 MMOL/L (ref 3.5–5.5)
PROT SERPL-MCNC: 7.4 G/DL (ref 6.4–8.2)
SODIUM SERPL-SCNC: 140 MMOL/L (ref 136–145)

## 2018-02-16 PROCEDURE — 36415 COLL VENOUS BLD VENIPUNCTURE: CPT | Performed by: FAMILY MEDICINE

## 2018-02-16 PROCEDURE — 86430 RHEUMATOID FACTOR TEST QUAL: CPT | Performed by: FAMILY MEDICINE

## 2018-02-16 PROCEDURE — 73120 X-RAY EXAM OF HAND: CPT

## 2018-02-16 PROCEDURE — 80053 COMPREHEN METABOLIC PANEL: CPT | Performed by: FAMILY MEDICINE

## 2018-02-16 PROCEDURE — 85652 RBC SED RATE AUTOMATED: CPT | Performed by: FAMILY MEDICINE

## 2018-02-16 RX ORDER — TRAZODONE HYDROCHLORIDE 50 MG/1
50 TABLET ORAL
Qty: 90 TAB | Refills: 0 | Status: SHIPPED | OUTPATIENT
Start: 2018-02-16 | End: 2018-05-30 | Stop reason: SDUPTHER

## 2018-02-16 RX ORDER — AMLODIPINE BESYLATE 5 MG/1
5 TABLET ORAL DAILY
Qty: 90 TAB | Refills: 0 | Status: SHIPPED | OUTPATIENT
Start: 2018-02-16 | End: 2018-07-10 | Stop reason: SDUPTHER

## 2018-02-16 NOTE — PROGRESS NOTES
HISTORY OF PRESENT ILLNESS  Danielito Perez is a 47 y.o. female. HPI: here for routine follow up. H/o hypertension. On medication. Shows compliance and no side effects. Today vitals stable and she is asymptomatic. Also had rt shoulder pain. Now improved significantly on PT. Taking symptomatic treatment only as needed. Also has h/o insomnia. Taking trazodone only as needed. No side effects. Trying to maintain sleep hygiene. discussed high BMI. Discussed diet modification, calorie count and exercise. Discussed importance of weight loss. agree to do exercise and life style modification. Diet and exercise hand out given in AVS.   Today c/o bilateral hand joint pain on and off. Feels swelling on and off and resolve on its own. On and off feels stiffness as well. Denies any unusual fatigue. Visit Vitals    /88 (BP 1 Location: Left arm, BP Patient Position: Sitting)    Pulse 80    Temp 97.8 °F (36.6 °C) (Oral)    Resp 16    Ht 5' 2\" (1.575 m)    Wt 164 lb 3.2 oz (74.5 kg)    SpO2 98%    BMI 30.03 kg/m2     Denies any headache, dizziness, no chest pain or trouble breathing, no arm or leg weakness. No nausea or vomiting, no weight or appetite changes, no depression or anxiety. No urine or bowel complains, no palpitation, no diaphoresis. Review labs.    Lab Results   Component Value Date/Time    Hemoglobin A1c 5.7 (H) 05/24/2017 09:25 AM     Lab Results   Component Value Date/Time    Sodium 140 05/24/2017 09:25 AM    Potassium 4.1 05/24/2017 09:25 AM    Chloride 104 05/24/2017 09:25 AM    CO2 29 05/24/2017 09:25 AM    Anion gap 7 05/24/2017 09:25 AM    Glucose 85 05/24/2017 09:25 AM    BUN 8 05/24/2017 09:25 AM    Creatinine 0.48 (L) 05/24/2017 09:25 AM    BUN/Creatinine ratio 17 05/24/2017 09:25 AM    GFR est AA >60 05/24/2017 09:25 AM    GFR est non-AA >60 05/24/2017 09:25 AM    Calcium 9.0 05/24/2017 09:25 AM    Bilirubin, total 0.1 (L) 05/24/2017 09:25 AM    AST (SGOT) 15 05/24/2017 09:25 AM Alk. phosphatase 68 05/24/2017 09:25 AM    Protein, total 7.0 05/24/2017 09:25 AM    Albumin 3.7 05/24/2017 09:25 AM    Globulin 3.3 05/24/2017 09:25 AM    A-G Ratio 1.1 05/24/2017 09:25 AM    ALT (SGPT) 18 05/24/2017 09:25 AM     Lab Results   Component Value Date/Time    Cholesterol, total 198 05/24/2017 09:25 AM    HDL Cholesterol 59 05/24/2017 09:25 AM    LDL, calculated 123.8 (H) 05/24/2017 09:25 AM    VLDL, calculated 15.2 05/24/2017 09:25 AM    Triglyceride 76 05/24/2017 09:25 AM    CHOL/HDL Ratio 3.4 05/24/2017 09:25 AM     Lab Results   Component Value Date/Time    TSH 1.040 06/09/2014 12:00 AM     Lab Results   Component Value Date/Time    WBC 5.9 05/24/2017 09:25 AM    HGB 12.3 05/24/2017 09:25 AM    HCT 36.4 05/24/2017 09:25 AM    PLATELET 857 98/41/8408 09:25 AM    MCV 86.5 05/24/2017 09:25 AM       ROS: see HPI     Physical Exam   Constitutional: She is oriented to person, place, and time. No distress. Neck: No thyromegaly present. Cardiovascular: Normal heart sounds. Pulmonary/Chest: No respiratory distress. She has no wheezes. Abdominal: Soft. There is no tenderness. Musculoskeletal: She exhibits no edema. Rt shoulder: no point tenderness range of motion wnl. Bilateral hand: no joint tenderness or swelling at this time. Rom over fingers wnl. Lymphadenopathy:     She has no cervical adenopathy. Neurological: She is oriented to person, place, and time. Psychiatric: Her behavior is normal.       ASSESSMENT and PLAN    ICD-10-CM ICD-9-CM    1. Right shoulder pain, unspecified chronicity: for now improved with PT. Will observe and continue home therapy as PT recommendations. M25.511 719.41    2. Sleep trouble: as needed taking medication. Will maintain sleep hygiene. G47.9 780.50 traZODone (DESYREL) 50 mg tablet   3. Essential hypertension: stable at this time. Low salt diet. Exercise as tolerated. Will continue current plan.     I10 401.9 amLODIPine (NORVASC) 5 mg tablet METABOLIC PANEL, COMPREHENSIVE   4. BMI 30.0-30.9,adult: discussed high BMI. Discussed diet modification, calorie count and exercise. Discussed importance of weight loss. agree to do exercise and life style modification. Diet and exercise hand out given in AVS.    Z68.30 V85.30    5. Arthralgia of both hands; obtain x-ray and r/o other inflammatory arthritis will obtain labs. M25.541 719.44 XR HAND LT AP/LAT    M25.542  XR HAND RT AP/LAT      RHEUMATOID FACTOR, QL      SED RATE (ESR)   Pt understood and agree with the plan   Review hM   Follow-up Disposition:  Return in about 3 months (around 5/16/2018) for need also appt for physical if not done within one year .

## 2018-02-16 NOTE — MR AVS SNAPSHOT
[facility] [address] [place] [address] 
[phone] Patient: Carlena Buerger MRN: [id number] :1965 Visit Information Date & Time Provider Department Dept. Phone Encounter #  
 2018 11:15 AM Param Granados, 0 [facility] [phone] [id number] Follow-up Instructions Return in about 3 months (around 2018) for need also appt for physical if not done within one year . Upcoming Health Maintenance Date Due  
 PAP AKA CERVICAL CYTOLOGY 3/3/2018 BREAST CANCER SCRN MAMMOGRAM 2019 DTaP/Tdap/Td series (2 - Td) 3/3/2025 Allergies as of 2018  Review Complete On: 2018 By: Param Granados MD  
 No Known Allergies Current Immunizations  Never Reviewed Name Date Tdap 3/3/2015 10:17 AM  
  
 Not reviewed this visit You Were Diagnosed With   
  
 Codes Comments Right shoulder pain, unspecified chronicity    -  Primary ICD-10-CM: M25.511 ICD-9-CM: 719.41 Sleep trouble     ICD-10-CM: G47.9 ICD-9-CM: 780.50 Essential hypertension     ICD-10-CM: I10 
ICD-9-CM: 401.9 BMI 30.0-30.9,adult     ICD-10-CM: Z68.30 ICD-9-CM: V85.30 Arthralgia of both hands     ICD-10-CM: M25.541, M25.542 ICD-9-CM: 719.44 Vitals BP Pulse Temp Resp Height(growth percentile) Weight(growth percentile) 138/88 (BP 1 Location: Left arm, BP Patient Position: Sitting) 80 97.8 °F (36.6 °C) (Oral) 16 5' 2\" (1.575 m) 164 lb 3.2 oz (74.5 kg) LMP SpO2 BMI OB Status Smoking Status 2017 98% 30.03 kg/m2 Having regular periods Never Smoker Vitals History BMI and BSA Data Body Mass Index Body Surface Area 30.03 kg/m 2 1.81 m 2 Preferred Pharmacy Pharmacy Name Phone [id number] [phone] Your Updated Medication List  
  
   
 This list is accurate as of: 2/16/18 11:51 AM.  Always use your most recent med list. amLODIPine 5 mg tablet Commonly known as:  Billie [place] Take 1 Tab by mouth daily. cholecalciferol 1,000 unit Cap Commonly known as:  VITAMIN D3 Take 1,000 Units by mouth two (2) times a day. cyclobenzaprine 5 mg tablet Commonly known as:  FLEXERIL Take 1 Tab by mouth nightly. diphenhydrAMINE 25 mg capsule Commonly known as:  BENADRYL Take 25 mg by mouth as needed. IRON PO Take 325 mg by mouth two (2) times a day. MULTIVITAMIN WITH IRON PO Take 1 Tab by mouth daily. naproxen 500 mg tablet Commonly known as:  NAPROSYN Take 1 Tab by mouth two (2) times daily (with meals). traZODone 50 mg tablet Commonly known as:  Stevphen Hacker Take 1 Tab by mouth nightly. Prescriptions Sent to Pharmacy Refills  
 amLODIPine (NORVASC) 5 mg tablet 0 Sig: Take 1 Tab by mouth daily. Class: Normal  
 [facility] [place] [address] Ph #: [phone] Route: Oral  
 traZODone (DESYREL) 50 mg tablet 0 Sig: Take 1 Tab by mouth nightly. Class: Normal  
 Pharmacy: [id number] Ph #: [phone] Route: Oral  
  
Follow-up Instructions Return in about 3 months (around 5/16/2018) for need also appt for physical if not done within one year . To-Do List   
 02/16/2018 Lab:  METABOLIC PANEL, COMPREHENSIVE   
  
 02/16/2018 Lab:  RHEUMATOID FACTOR, QL   
  
 02/16/2018 Lab:  SED RATE (ESR)   
  
 02/16/2018 Imaging:  XR HAND LT AP/LAT   
  
 02/16/2018 Imaging:  XR HAND RT AP/LAT Patient Instructions Joint Pain: Care Instructions Your Care Instructions Many people have small aches and pains from overuse or injury to muscles and joints.  Joint injuries often happen during sports or recreation, work tasks, or projects around the home. An overuse injury can happen when you put too much stress on a joint or when you do an activity that stresses the joint over and over, such as using the computer or rowing a boat. You can take action at home to help your muscles and joints get better. You should feel better in 1 to 2 weeks, but it can take 3 months or more to heal completely. Follow-up care is a key part of your treatment and safety. Be sure to make and go to all appointments, and call your doctor if you are having problems. It's also a good idea to know your test results and keep a list of the medicines you take. How can you care for yourself at home? · Do not put weight on the injured joint for at least a day or two. · For the first day or two after an injury, do not take hot showers or baths, and do not use hot packs. The heat could make swelling worse. · Put ice or a cold pack on the sore joint for 10 to 20 minutes at a time. Try to do this every 1 to 2 hours for the next 3 days (when you are awake) or until the swelling goes down. Put a thin cloth between the ice and your skin. · Wrap the injury in an elastic bandage. Do not wrap it too tightly because this can cause more swelling. · Prop up the sore joint on a pillow when you ice it or anytime you sit or lie down during the next 3 days. Try to keep it above the level of your heart. This will help reduce swelling. · Take an over-the-counter pain medicine, such as acetaminophen (Tylenol), ibuprofen (Advil, Motrin), or naproxen (Aleve). Read and follow all instructions on the label. · After 1 or 2 days of rest, begin moving the joint gently. While the joint is still healing, you can begin to exercise using activities that do not strain or hurt the painful joint. When should you call for help? Call your doctor now or seek immediate medical care if: 
? · You have signs of infection, such as: 
¨ Increased pain, swelling, warmth, and redness. ¨ Red streaks leading from the joint. ¨ A fever. ? Watch closely for changes in your health, and be sure to contact your doctor if: 
? · Your movement or symptoms are not getting better after 1 to 2 weeks of home treatment. Where can you learn more? Go to http://kings-junito.info/. Enter P205 in the search box to learn more about \"Joint Pain: Care Instructions. \" Current as of: March 21, 2017 Content Version: 11.4 © 7405-2009 [employer]Tree. Care instructions adapted under license by [employer] (which disclaims liability or warranty for this information). If you have questions about a medical condition or this instruction, always ask your healthcare professional. [place] [address] any warranty or liability for your use of this information. Learning About Low-Carbohydrate Diets for Weight Loss What is a low-carbohydrate diet? Low-carb diets avoid foods that are high in carbohydrate. These high-carb foods include pasta, bread, rice, cereal, fruits, and starchy vegetables. Instead, these diets usually have you eat foods that are high in fat and protein. Many people lose weight quickly on a low-carb diet. But the early weight loss is water. People on this diet often gain the weight back after they start eating carbs again. Not all diet plans are safe or work well. A lot of the evidence shows that low-carb diets aren't healthy. That's because these diets often don't include healthy foods like fruits and vegetables. Losing weight safely means balancing protein, fat, and carbs with every meal and snack. And low-carb diets don't always provide the vitamins, minerals, and fiber you need. If you have a serious medical condition, talk to your doctor before you try any diet. These conditions include kidney disease, heart disease, type 2 diabetes, high cholesterol, and high blood pressure. If you are pregnant, it may not be safe for your baby if you are on a low-carb diet. How can you lose weight safely? You might have heard that a diet plan helped another person lose weight. But that doesn't mean that it will work for you. It is very hard to stay on a diet that includes lots of big changes in your eating habits. If you want to get to a healthy weight and stay there, making healthy lifestyle changes will often work better than dieting. These steps can help. · Make a plan for change. Work with your doctor to create a plan that is right for you. · See a dietitian. He or she can show you how to make healthy changes in your eating habits. · Manage stress. If you have a lot of stress in your life, it can be hard to focus on making healthy changes to your daily habits. · Track your food and activity. You are likely to do better at losing weight if you keep track of what you eat and what you do. Follow-up care is a key part of your treatment and safety. Be sure to make and go to all appointments, and call your doctor if you are having problems. It's also a good idea to know your test results and keep a list of the medicines you take. Where can you learn more? Go to http://kings-junito.info/. Enter A121 in the search box to learn more about \"Learning About Low-Carbohydrate Diets for Weight Loss. \" Current as of: May 12, 2017 Content Version: 11.4 © 1118-8792 [employer]. Care instructions adapted under license by [employer] (which disclaims liability or warranty for this information). If you have questions about a medical condition or this instruction, always ask your healthcare professional. [address] any warranty or liability for your use of this information. Learning About Physical Activity What is physical activity? Physical activity is any kind of activity that gets your body moving. The types of physical activity that can help you get fit and stay healthy include: · Aerobic or \"cardio\" activities that make your heart beat faster and make you breathe harder, such as brisk walking, riding a bike, or running. Aerobic activities strengthen your heart and lungs and build up your endurance. · Strength training activities that make your muscles work against, or \"resist,\" something, such as lifting weights or doing push-ups. These activities help tone and strengthen your muscles. · Stretches that allow you to move your joints and muscles through their full range of motion. Stretching helps you be more flexible and avoid injury. What are the benefits of physical activity? Being active is one of the best things you can do to get fit and stay healthy. It helps you to: · Feel stronger and have more energy to do all the things you like to do. · Focus better at school or work and perform better in sports. · Feel, think, and sleep better. · Reach and stay at a healthy weight. · Lose fat and build lean muscle. · Lower your risk for serious health problems. · Keep your bones, muscles, and joints strong. Being fit lets you do more physical activity. And it lets you work out harder without as much effort. How can you make physical activity part of your life? Get at least 30 minutes of exercise on most days of the week. Walking is a good choice. You also may want to do other activities, such as running, swimming, cycling, or playing tennis or team sports. Pick activities that you like-ones that make your heart beat faster, your muscles stronger, and your muscles and joints more flexible. If you find more than one thing you like doing, do them all. You don't have to do the same thing every day. Get your heart pumping every day. Any activity that makes your heart beat faster and keeps it at that rate for a while counts. Here are some great ways to get your heart beating faster: · Go for a brisk walk, run, or bike ride. · Go for a hike or swim. · Go in-line skating. · Play a game of touch football, basketball, or soccer. · Ride a bike. · Play tennis or racquetball. · Climb stairs. Even some household chores can be aerobic-just do them at a faster pace. Vacuuming, raking or mowing the lawn, sweeping the garage, and washing and waxing the car all can help get your heart rate up. Strengthen your muscles during the week. You don't have to lift heavy weights or grow big, bulky muscles to get stronger. Doing a few simple activities that make your muscles work against, or \"resist,\" something can help you get stronger. For example, you can: · Do push-ups or sit-ups, which use your own body weight as resistance. · Lift weights or dumbbells or use stretch bands at home or in a gym or community center. Stretch your muscles often. Stretching will help you as you become more active. It can help you stay flexible, loosen tight muscles, and avoid injury. It can also help improve your balance and posture and can be a great way to relax. Be sure to stretch the muscles you'll be using when you work out. It's best to warm your muscles slightly before you stretch them. Walk or do some other light aerobic activity for a few minutes, and then start stretching. When you stretch your muscles: · Do it slowly. Stretching is not about going fast or making sudden movements. · Don't push or bounce during a stretch. · Hold each stretch for at least 15 to 30 seconds, if you can. You should feel a stretch in the muscle, but not pain. · Breathe out as you do the stretch. Then breathe in as you hold the stretch. Don't hold your breath. If you're worried about how more activity might affect your health, have a checkup before you start. Follow any special advice your doctor gives you for getting a smart start. Where can you learn more? Go to http://jerardo.info/. Enter O364 in the search box to learn more about \"Learning About Physical Activity. \" Current as of: March 13, 2017 Content Version: 11.4 © 1220-2867 [email or web address].com. Care instructions adapted under license by [employer] (which disclaims liability or warranty for this information). If you have questions about a medical condition or this instruction, always ask your healthcare professional. [address] any warranty or liability for your use of this information. Low Sodium Diet (2,000 Milligram): Care Instructions Your Care Instructions Too much sodium causes your body to hold on to extra water. This can raise your blood pressure and force your heart and kidneys to work harder. In very serious cases, this could cause you to be put in the hospital. It might even be life-threatening. By limiting sodium, you will feel better and lower your risk of serious problems. The most common source of sodium is salt. People get most of the salt in their diet from canned, prepared, and packaged foods. Fast food and restaurant meals also are very high in sodium. Your doctor will probably limit your sodium to less than 2,000 milligrams (mg) a day. This limit counts all the sodium in prepared and packaged foods and any salt you add to your food. Follow-up care is a key part of your treatment and safety. Be sure to make and go to all appointments, and call your doctor if you are having problems. It's also a good idea to know your test results and keep a list of the medicines you take. How can you care for yourself at home? Read food labels · Read labels on cans and food packages. The labels tell you how much sodium is in each serving. Make sure that you look at the serving size. If you eat more than the serving size, you have eaten more sodium. · Food labels also tell you the Percent Daily Value for sodium. Choose products with low Percent Daily Values for sodium. · Be aware that sodium can come in forms other than salt, including monosodium glutamate (MSG), sodium citrate, and sodium bicarbonate (baking soda). MSG is often added to Asian food. When you eat out, you can sometimes ask for food without MSG or added salt. Buy low-sodium foods · Buy foods that are labeled \"unsalted\" (no salt added), \"sodium-free\" (less than 5 mg of sodium per serving), or \"low-sodium\" (less than 140 mg of sodium per serving). Foods labeled \"reduced-sodium\" and \"light sodium\" may still have too much sodium. Be sure to read the label to see how much sodium you are getting. · Buy fresh vegetables, or frozen vegetables without added sauces. Buy low-sodium versions of canned vegetables, soups, and other canned goods. Prepare low-sodium meals · Cut back on the amount of salt you use in cooking. This will help you adjust to the taste. Do not add salt after cooking. One teaspoon of salt has about 2,300 mg of sodium. · Take the salt shaker off the table. · Flavor your food with garlic, lemon juice, onion, vinegar, herbs, and spices. Do not use soy sauce, lite soy sauce, steak sauce, onion salt, garlic salt, celery salt, mustard, or ketchup on your food. · Use low-sodium salad dressings, sauces, and ketchup. Or make your own salad dressings and sauces without adding salt. · Use less salt (or none) when recipes call for it. You can often use half the salt a recipe calls for without losing flavor. Other foods such as rice, pasta, and grains do not need added salt. · Rinse canned vegetables, and cook them in fresh water. This removes some-but not all-of the salt. · Avoid water that is naturally high in sodium or that has been treated with water softeners, which add sodium. Call your local water company to find out the sodium content of your water supply. If you buy bottled water, read the label and choose a sodium-free brand. Avoid high-sodium foods · Avoid eating: ¨ Smoked, cured, salted, and canned meat, fish, and poultry. ¨ Ham, ravi, hot dogs, and luncheon meats. ¨ Regular, hard, and processed cheese and regular peanut butter. ¨ Crackers with salted tops, and other salted snack foods such as pretzels, chips, and salted popcorn. ¨ Frozen prepared meals, unless labeled low-sodium. ¨ Canned and dried soups, broths, and bouillon, unless labeled sodium-free or low-sodium. ¨ Canned vegetables, unless labeled sodium-free or low-sodium. ¨ Western Dina fries, pizza, tacos, and other fast foods. ¨ Pickles, olives, ketchup, and other condiments, especially soy sauce, unless labeled sodium-free or low-sodium. Where can you learn more? Go to http://kings-junito.info/. Enter V012 in the search box to learn more about \"Low Sodium Diet (2,000 Milligram): Care Instructions. \" Current as of: May 12, 2017 Content Version: 11.4 © 9672-8504 [employer]Ogave. Care instructions adapted under license by [employer] (which disclaims liability or warranty for this information). If you have questions about a medical condition or this instruction, always ask your healthcare professional. [address] any warranty or liability for your use of this information. Introducing [facility]! [facility] introduces Alektrona patient portal. Now you can access parts of your medical record, email your doctor's office, and request medication refills online. 1. In your internet browser, go to [email or web address] 2. Click on the First Time User? Click Here link in the Sign In box. You will see the New Member Sign Up page. 3. Enter your Alektrona Access Code exactly as it appears below. You will not need to use this code after youve completed the sign-up process. If you do not sign up before the expiration date, you must request a new code. · Alektrona Access Code: [email or web address] Expires: 5/17/2018 11:51 AM 
 4. Enter the last four digits of your Social Security Number (xxxx) and Date of Birth (mm/dd/yyyy) as indicated and click Submit. You will be taken to the next sign-up page. 5. Create a Twist ID. This will be your Twist login ID and cannot be changed, so think of one that is secure and easy to remember. 6. Create a Twist password. You can change your password at any time. 7. Enter your Password Reset Question and Answer. This can be used at a later time if you forget your password. 8. Enter your e-mail address. You will receive e-mail notification when new information is available in [address] 9. Click Sign Up. You can now view and download portions of your medical record. 10. Click the Download Summary menu link to download a portable copy of your medical information. If you have questions, please visit the Frequently Asked Questions section of the Twist website. Remember, Twist is NOT to be used for urgent needs. For medical emergencies, dial 911. Now available from your iPhone and Android! Please provide this summary of care documentation to your next provider. Your primary care clinician is listed as Ariella Hernandez. If you have any questions after today's visit, please call [phone].

## 2018-02-16 NOTE — PROGRESS NOTES
1. Have you been to the ER, urgent care clinic since your last visit? Hospitalized since your last visit? [facility] 11/22/17    2. Have you seen or consulted any other health care providers outside of the [address] [place] [address] since your last visit? Include any pap smears or colon screening. No    Last pap smear - 3/03/15    Patient wants to know how often she needs to complete colonoscopy.

## 2018-02-16 NOTE — PATIENT INSTRUCTIONS
Joint Pain: Care Instructions  Your Care Instructions    Many people have small aches and pains from overuse or injury to muscles and joints. Joint injuries often happen during sports or recreation, work tasks, or projects around the home. An overuse injury can happen when you put too much stress on a joint or when you do an activity that stresses the joint over and over, such as using the computer or rowing a boat. You can take action at home to help your muscles and joints get better. You should feel better in 1 to 2 weeks, but it can take 3 months or more to heal completely. Follow-up care is a key part of your treatment and safety. Be sure to make and go to all appointments, and call your doctor if you are having problems. It's also a good idea to know your test results and keep a list of the medicines you take. How can you care for yourself at home? · Do not put weight on the injured joint for at least a day or two. · For the first day or two after an injury, do not take hot showers or baths, and do not use hot packs. The heat could make swelling worse. · Put ice or a cold pack on the sore joint for 10 to 20 minutes at a time. Try to do this every 1 to 2 hours for the next 3 days (when you are awake) or until the swelling goes down. Put a thin cloth between the ice and your skin. · Wrap the injury in an elastic bandage. Do not wrap it too tightly because this can cause more swelling. · Prop up the sore joint on a pillow when you ice it or anytime you sit or lie down during the next 3 days. Try to keep it above the level of your heart. This will help reduce swelling. · Take an over-the-counter pain medicine, such as acetaminophen (Tylenol), ibuprofen (Advil, Motrin), or naproxen (Aleve). Read and follow all instructions on the label. · After 1 or 2 days of rest, begin moving the joint gently.  While the joint is still healing, you can begin to exercise using activities that do not strain or hurt the painful joint. When should you call for help? Call your doctor now or seek immediate medical care if:  ? · You have signs of infection, such as:  ¨ Increased pain, swelling, warmth, and redness. ¨ Red streaks leading from the joint. ¨ A fever. ? Watch closely for changes in your health, and be sure to contact your doctor if:  ? · Your movement or symptoms are not getting better after 1 to 2 weeks of home treatment. Where can you learn more? Go to http://kings-junito.info/. Enter P205 in the search box to learn more about \"Joint Pain: Care Instructions. \"  Current as of: March 21, 2017  Content Version: 11.4  © 4143-6087 [employer]. Care instructions adapted under license by [employer] (which disclaims liability or warranty for this information). If you have questions about a medical condition or this instruction, always ask your healthcare professional. [place] [address] any warranty or liability for your use of this information. Learning About Low-Carbohydrate Diets for Weight Loss  What is a low-carbohydrate diet? Low-carb diets avoid foods that are high in carbohydrate. These high-carb foods include pasta, bread, rice, cereal, fruits, and starchy vegetables. Instead, these diets usually have you eat foods that are high in fat and protein. Many people lose weight quickly on a low-carb diet. But the early weight loss is water. People on this diet often gain the weight back after they start eating carbs again. Not all diet plans are safe or work well. A lot of the evidence shows that low-carb diets aren't healthy. That's because these diets often don't include healthy foods like fruits and vegetables. Losing weight safely means balancing protein, fat, and carbs with every meal and snack. And low-carb diets don't always provide the vitamins, minerals, and fiber you need.   If you have a serious medical condition, talk to your doctor before you try any diet. These conditions include kidney disease, heart disease, type 2 diabetes, high cholesterol, and high blood pressure. If you are pregnant, it may not be safe for your baby if you are on a low-carb diet. How can you lose weight safely? You might have heard that a diet plan helped another person lose weight. But that doesn't mean that it will work for you. It is very hard to stay on a diet that includes lots of big changes in your eating habits. If you want to get to a healthy weight and stay there, making healthy lifestyle changes will often work better than dieting. These steps can help. · Make a plan for change. Work with your doctor to create a plan that is right for you. · See a dietitian. He or she can show you how to make healthy changes in your eating habits. · Manage stress. If you have a lot of stress in your life, it can be hard to focus on making healthy changes to your daily habits. · Track your food and activity. You are likely to do better at losing weight if you keep track of what you eat and what you do. Follow-up care is a key part of your treatment and safety. Be sure to make and go to all appointments, and call your doctor if you are having problems. It's also a good idea to know your test results and keep a list of the medicines you take. Where can you learn more? Go to http://kings-junito.info/. Enter A121 in the search box to learn more about \"Learning About Low-Carbohydrate Diets for Weight Loss. \"  Current as of: May 12, 2017  Content Version: 11.4  © 3373-5833 [employer]. Care instructions adapted under license by [employer] (which disclaims liability or warranty for this information). If you have questions about a medical condition or this instruction, always ask your healthcare professional. [address] any warranty or liability for your use of this information.        Learning About Physical Activity  What is physical activity? Physical activity is any kind of activity that gets your body moving. The types of physical activity that can help you get fit and stay healthy include:  · Aerobic or \"cardio\" activities that make your heart beat faster and make you breathe harder, such as brisk walking, riding a bike, or running. Aerobic activities strengthen your heart and lungs and build up your endurance. · Strength training activities that make your muscles work against, or \"resist,\" something, such as lifting weights or doing push-ups. These activities help tone and strengthen your muscles. · Stretches that allow you to move your joints and muscles through their full range of motion. Stretching helps you be more flexible and avoid injury. What are the benefits of physical activity? Being active is one of the best things you can do to get fit and stay healthy. It helps you to:  · Feel stronger and have more energy to do all the things you like to do. · Focus better at school or work and perform better in sports. · Feel, think, and sleep better. · Reach and stay at a healthy weight. · Lose fat and build lean muscle. · Lower your risk for serious health problems. · Keep your bones, muscles, and joints strong. Being fit lets you do more physical activity. And it lets you work out harder without as much effort. How can you make physical activity part of your life? Get at least 30 minutes of exercise on most days of the week. Walking is a good choice. You also may want to do other activities, such as running, swimming, cycling, or playing tennis or team sports. Pick activities that you like-ones that make your heart beat faster, your muscles stronger, and your muscles and joints more flexible. If you find more than one thing you like doing, do them all. You don't have to do the same thing every day. Get your heart pumping every day.  Any activity that makes your heart beat faster and keeps it at that rate for a while counts. Here are some great ways to get your heart beating faster:  · Go for a brisk walk, run, or bike ride. · Go for a hike or swim. · Go in-line skating. · Play a game of touch football, basketball, or soccer. · Ride a bike. · Play tennis or racquetball. · Climb stairs. Even some household chores can be aerobic-just do them at a faster pace. Vacuuming, raking or mowing the lawn, sweeping the garage, and washing and waxing the car all can help get your heart rate up. Strengthen your muscles during the week. You don't have to lift heavy weights or grow big, bulky muscles to get stronger. Doing a few simple activities that make your muscles work against, or \"resist,\" something can help you get stronger. For example, you can:  · Do push-ups or sit-ups, which use your own body weight as resistance. · Lift weights or dumbbells or use stretch bands at home or in a gym or community center. Stretch your muscles often. Stretching will help you as you become more active. It can help you stay flexible, loosen tight muscles, and avoid injury. It can also help improve your balance and posture and can be a great way to relax. Be sure to stretch the muscles you'll be using when you work out. It's best to warm your muscles slightly before you stretch them. Walk or do some other light aerobic activity for a few minutes, and then start stretching. When you stretch your muscles:  · Do it slowly. Stretching is not about going fast or making sudden movements. · Don't push or bounce during a stretch. · Hold each stretch for at least 15 to 30 seconds, if you can. You should feel a stretch in the muscle, but not pain. · Breathe out as you do the stretch. Then breathe in as you hold the stretch. Don't hold your breath. If you're worried about how more activity might affect your health, have a checkup before you start.  Follow any special advice your doctor gives you for getting a smart start.  Where can you learn more? Go to http://kings-junito.info/. Enter E633 in the search box to learn more about \"Learning About Physical Activity. \"  Current as of: March 13, 2017  Content Version: 11.4  © 1843-6379 [employer]. Care instructions adapted under license by [employer] (which disclaims liability or warranty for this information). If you have questions about a medical condition or this instruction, always ask your healthcare professional. [place] [address] any warranty or liability for your use of this information. Low Sodium Diet (2,000 Milligram): Care Instructions  Your Care Instructions    Too much sodium causes your body to hold on to extra water. This can raise your blood pressure and force your heart and kidneys to work harder. In very serious cases, this could cause you to be put in the hospital. It might even be life-threatening. By limiting sodium, you will feel better and lower your risk of serious problems. The most common source of sodium is salt. People get most of the salt in their diet from canned, prepared, and packaged foods. Fast food and restaurant meals also are very high in sodium. Your doctor will probably limit your sodium to less than 2,000 milligrams (mg) a day. This limit counts all the sodium in prepared and packaged foods and any salt you add to your food. Follow-up care is a key part of your treatment and safety. Be sure to make and go to all appointments, and call your doctor if you are having problems. It's also a good idea to know your test results and keep a list of the medicines you take. How can you care for yourself at home? Read food labels  · Read labels on cans and food packages. The labels tell you how much sodium is in each serving. Make sure that you look at the serving size. If you eat more than the serving size, you have eaten more sodium.   · Food labels also tell you the Percent Daily Value for sodium. Choose products with low Percent Daily Values for sodium. · Be aware that sodium can come in forms other than salt, including monosodium glutamate (MSG), sodium citrate, and sodium bicarbonate (baking soda). MSG is often added to Asian food. When you eat out, you can sometimes ask for food without MSG or added salt. Buy low-sodium foods  · Buy foods that are labeled \"unsalted\" (no salt added), \"sodium-free\" (less than 5 mg of sodium per serving), or \"low-sodium\" (less than 140 mg of sodium per serving). Foods labeled \"reduced-sodium\" and \"light sodium\" may still have too much sodium. Be sure to read the label to see how much sodium you are getting. · Buy fresh vegetables, or frozen vegetables without added sauces. Buy low-sodium versions of canned vegetables, soups, and other canned goods. Prepare low-sodium meals  · Cut back on the amount of salt you use in cooking. This will help you adjust to the taste. Do not add salt after cooking. One teaspoon of salt has about 2,300 mg of sodium. · Take the salt shaker off the table. · Flavor your food with garlic, lemon juice, onion, vinegar, herbs, and spices. Do not use soy sauce, lite soy sauce, steak sauce, onion salt, garlic salt, celery salt, mustard, or ketchup on your food. · Use low-sodium salad dressings, sauces, and ketchup. Or make your own salad dressings and sauces without adding salt. · Use less salt (or none) when recipes call for it. You can often use half the salt a recipe calls for without losing flavor. Other foods such as rice, pasta, and grains do not need added salt. · Rinse canned vegetables, and cook them in fresh water. This removes some-but not all-of the salt. · Avoid water that is naturally high in sodium or that has been treated with water softeners, which add sodium. Call your local water company to find out the sodium content of your water supply.  If you buy bottled water, read the label and choose a sodium-free brand. Avoid high-sodium foods  · Avoid eating:  ¨ Smoked, cured, salted, and canned meat, fish, and poultry. ¨ Ham, ravi, hot dogs, and luncheon meats. ¨ Regular, hard, and processed cheese and regular peanut butter. ¨ Crackers with salted tops, and other salted snack foods such as pretzels, chips, and salted popcorn. ¨ Frozen prepared meals, unless labeled low-sodium. ¨ Canned and dried soups, broths, and bouillon, unless labeled sodium-free or low-sodium. ¨ Canned vegetables, unless labeled sodium-free or low-sodium. ¨ [employer] fries, pizza, tacos, and other fast foods. ¨ Pickles, olives, ketchup, and other condiments, especially soy sauce, unless labeled sodium-free or low-sodium. Where can you learn more? Go to http://kings-junito.info/. Enter R784 in the search box to learn more about \"Low Sodium Diet (2,000 Milligram): Care Instructions. \"  Current as of: May 12, 2017  Content Version: 11.4  © 7007-3421 [employer]. Care instructions adapted under license by [employer] (which disclaims liability or warranty for this information). If you have questions about a medical condition or this instruction, always ask your healthcare professional. [address] any warranty or liability for your use of this information.

## 2018-02-17 LAB — RHEUMATOID FACT SER QL LA: NEGATIVE

## 2018-02-20 DIAGNOSIS — M25.50 MULTIPLE JOINT PAIN: ICD-10-CM

## 2018-02-20 DIAGNOSIS — R70.0 ELEVATED SED RATE: Primary | ICD-10-CM

## 2018-02-20 NOTE — PROGRESS NOTES
Spoke with patient (2 verifiers name/) regarding hand x-ray showed no acute changes and further discussion on follow up visit. Patient voiced understanding.

## 2018-02-20 NOTE — PROGRESS NOTES
Spoke with patient (2 verifiers name/) regarding bilateral hand x-ray showed no abnormality. Patient voiced understanding.

## 2018-02-20 NOTE — PROGRESS NOTES
Spoke with patient (2 verifiers name/) regarding rheumatoid factor is negative and high SED rate. Patient informed Dr Tino Carmona has referred her to the rheumatologist to be evaluated further. Patient voiced understanding.

## 2018-02-20 NOTE — PROGRESS NOTES
Let pt know that rheumatoid factor is negative and high SED rate. Can send her to rheumatologist to evaluate further. Thanks.

## 2018-05-30 DIAGNOSIS — G47.9 SLEEP TROUBLE: ICD-10-CM

## 2018-05-31 RX ORDER — TRAZODONE HYDROCHLORIDE 50 MG/1
TABLET ORAL
Qty: 90 TAB | Refills: 0 | Status: SHIPPED | OUTPATIENT
Start: 2018-05-31 | End: 2018-09-03 | Stop reason: SDUPTHER

## 2018-06-15 RX ORDER — NAPROXEN 500 MG/1
TABLET ORAL
Qty: 60 TAB | Refills: 0 | Status: SHIPPED | OUTPATIENT
Start: 2018-06-15 | End: 2018-07-10 | Stop reason: SDUPTHER

## 2018-07-10 ENCOUNTER — OFFICE VISIT (OUTPATIENT)
Dept: FAMILY MEDICINE CLINIC | Age: 53
End: 2018-07-10

## 2018-07-10 ENCOUNTER — HOSPITAL ENCOUNTER (OUTPATIENT)
Dept: LAB | Age: 53
Discharge: HOME | End: 2018-07-10
Payer: OTHER GOVERNMENT

## 2018-07-10 VITALS
RESPIRATION RATE: 16 BRPM | DIASTOLIC BLOOD PRESSURE: 88 MMHG | OXYGEN SATURATION: 98 % | TEMPERATURE: 97.6 F | HEIGHT: 62 IN | HEART RATE: 74 BPM | BODY MASS INDEX: 30.14 KG/M2 | SYSTOLIC BLOOD PRESSURE: 130 MMHG | WEIGHT: 163.8 LBS

## 2018-07-10 DIAGNOSIS — R70.0 ESR RAISED: ICD-10-CM

## 2018-07-10 DIAGNOSIS — I10 ESSENTIAL HYPERTENSION: ICD-10-CM

## 2018-07-10 DIAGNOSIS — Z12.4 SCREENING FOR MALIGNANT NEOPLASM OF CERVIX: ICD-10-CM

## 2018-07-10 DIAGNOSIS — M25.549 ARTHRALGIA OF HAND, UNSPECIFIED LATERALITY: ICD-10-CM

## 2018-07-10 DIAGNOSIS — Z01.419 WELL WOMAN EXAM WITH ROUTINE GYNECOLOGICAL EXAM: Primary | ICD-10-CM

## 2018-07-10 PROCEDURE — 88175 CYTOPATH C/V AUTO FLUID REDO: CPT | Performed by: FAMILY MEDICINE

## 2018-07-10 RX ORDER — LISINOPRIL 10 MG/1
10 TABLET ORAL DAILY
Qty: 30 TAB | Refills: 1 | Status: SHIPPED | OUTPATIENT
Start: 2018-07-10 | End: 2018-09-09 | Stop reason: SDUPTHER

## 2018-07-10 RX ORDER — PIROXICAM 20 MG/1
1 CAPSULE ORAL DAILY
COMMUNITY
Start: 2018-07-02 | End: 2018-12-08

## 2018-07-10 RX ORDER — AMLODIPINE BESYLATE 5 MG/1
5 TABLET ORAL DAILY
Qty: 90 TAB | Refills: 0 | Status: SHIPPED | OUTPATIENT
Start: 2018-07-10 | End: 2018-07-10 | Stop reason: ALTCHOICE

## 2018-07-10 RX ORDER — NAPROXEN 500 MG/1
500 TABLET ORAL
Qty: 60 TAB | Refills: 0 | Status: SHIPPED | OUTPATIENT
Start: 2018-07-10 | End: 2018-11-07 | Stop reason: SDUPTHER

## 2018-07-10 NOTE — MR AVS SNAPSHOT
[address] [facility] [address] [address] 200 [facility] 
[phone] Patient: Melonie Cherry MRN: [id number] :1965 Visit Information Date & Time Provider Department Dept. Phone Encounter #  
 7/10/2018  1:30 PM Rambo Dallas, [address] [place] [address] [phone] [id number] Follow-up Instructions Return in about 1 month (around 8/10/2018). Your Appointments 7/10/2018  1:30 PM  
Complete Physical with Rambo Dallas MD  
[address] [place] [address] ([facility]) Appt Note: 3 week f/u cpe; pt r/s from 18; RESCHEDULED FROM 2018; UNABLE TO [address], [facility] [address] [facility]  
[address] [facility] 200 [facility] Upcoming Health Maintenance Date Due  
 PAP AKA CERVICAL CYTOLOGY 3/3/2018 BREAST CANCER SCRN MAMMOGRAM 2019 DTaP/Tdap/Td series (2 - Td) 3/3/2025 Allergies as of 7/10/2018  Review Complete On: 7/10/2018 By: Rambo Dallas MD  
 No Known Allergies Current Immunizations  Never Reviewed Name Date Tdap 3/3/2015 10:17 AM  
  
 Not reviewed this visit You Were Diagnosed With   
  
 Codes Comments Well woman exam with routine gynecological exam    -  Primary ICD-10-CM: Q84.548 ICD-9-CM: V72.31 [V72.31] Screening for malignant neoplasm of cervix     ICD-10-CM: Z12.4 ICD-9-CM: V76.2 Essential hypertension     ICD-10-CM: I10 
ICD-9-CM: 401.9 Arthralgia of hand, unspecified laterality     ICD-10-CM: M25.549 ICD-9-CM: 719.44 ESR raised     ICD-10-CM: R70.0 ICD-9-CM: 790.1 Vitals BP Pulse Temp Resp Height(growth percentile) Weight(growth percentile) 130/88 (BP 1 Location: Left arm, BP Patient Position: Sitting) 74 97.6 °F (36.4 °C) (Oral) 16 5' 2\" (1.575 m) 163 lb 12.8 oz (74.3 kg) LMP SpO2 BMI OB Status Smoking Status 08/24/2017 98% 29.96 kg/m2 Having regular periods Never Smoker Vitals History BMI and BSA Data Body Mass Index Body Surface Area  
 29.96 kg/m 2 1.8 m 2 Preferred Pharmacy Pharmacy Name Phone [id number] [place], [address] [place] [address] [phone] Your Updated Medication List  
  
   
This list is accurate as of 7/10/18  1:22 PM.  Always use your most recent med list.  
  
  
  
  
 cholecalciferol 1,000 unit Cap Commonly known as:  VITAMIN D3 Take 1,000 Units by mouth two (2) times a day. cyclobenzaprine 5 mg tablet Commonly known as:  FLEXERIL Take 1 Tab by mouth nightly. diphenhydrAMINE 25 mg capsule Commonly known as:  BENADRYL Take 25 mg by mouth as needed. IRON PO Take 325 mg by mouth two (2) times a day. lisinopril 10 mg tablet Commonly known as:  Thersia Kubas Take 1 Tab by mouth daily. MULTIVITAMIN WITH IRON PO Take 1 Tab by mouth daily. naproxen 500 mg tablet Commonly known as:  NAPROSYN Take 1 Tab by mouth two (2) times daily as needed. piroxicam 20 mg capsule Commonly known as:  Audrea Lush Take 1 Cap by mouth daily. traZODone 50 mg tablet Commonly known as:  DESYREL  
TAKE 1 TABLET BY MOUTH NIGHTLY. GENERIC FOR DESYREL Prescriptions Sent to Pharmacy Refills  
 naproxen (NAPROSYN) 500 mg tablet 0 Sig: Take 1 Tab by mouth two (2) times daily as needed. Class: Normal  
 Pharmacy: [id number] - [address] [facility], [address] [place] [address] Ph #: [phone] Route: Oral  
 lisinopril (PRINIVIL, ZESTRIL) 10 mg tablet 1 Sig: Take 1 Tab by mouth daily. Class: Normal  
 Pharmacy: [id number] - [address] [facility], [address] [place] [address] Ph #: [phone] Route: Oral  
  
Follow-up Instructions Return in about 1 month (around 8/10/2018). Patient Instructions High Blood Pressure: Care Instructions Your Care Instructions If your blood pressure is usually above 130/80, you have high blood pressure, or hypertension. That means the top number is 130 or higher or the bottom number is 80 or higher, or both. Despite what a lot of people think, high blood pressure usually doesn't cause headaches or make you feel dizzy or lightheaded. It usually has no symptoms. But it does increase your risk for heart attack, stroke, and kidney or eye damage. The higher your blood pressure, the more your risk increases. Your doctor will give you a goal for your blood pressure. Your goal will be based on your health and your age. Lifestyle changes, such as eating healthy and being active, are always important to help lower blood pressure. You might also take medicine to reach your blood pressure goal. 
Follow-up care is a key part of your treatment and safety. Be sure to make and go to all appointments, and call your doctor if you are having problems. It's also a good idea to know your test results and keep a list of the medicines you take. How can you care for yourself at home? Medical treatment · If you stop taking your medicine, your blood pressure will go back up. You may take one or more types of medicine to lower your blood pressure. Be safe with medicines. Take your medicine exactly as prescribed. Call your doctor if you think you are having a problem with your medicine. · Talk to your doctor before you start taking aspirin every day. Aspirin can help certain people lower their risk of a heart attack or stroke. But taking aspirin isn't right for everyone, because it can cause serious bleeding. · See your doctor regularly. You may need to see the doctor more often at first or until your blood pressure comes down. · If you are taking blood pressure medicine, talk to your doctor before you take decongestants or anti-inflammatory medicine, such as ibuprofen. Some of these medicines can raise blood pressure. · Learn how to check your blood pressure at home. Lifestyle changes · Stay at a healthy weight. This is especially important if you put on weight around the waist. Losing even 10 pounds can help you lower your blood pressure. · If your doctor recommends it, get more exercise. Walking is a good choice. Bit by bit, increase the amount you walk every day. Try for at least 30 minutes on most days of the week. You also may want to swim, bike, or do other activities. · Avoid or limit alcohol. Talk to your doctor about whether you can drink any alcohol. · Try to limit how much sodium you eat to less than 2,300 milligrams (mg) a day. Your doctor may ask you to try to eat less than 1,500 mg a day. · Eat plenty of fruits (such as bananas and oranges), vegetables, legumes, whole grains, and low-fat dairy products. · Lower the amount of saturated fat in your diet. Saturated fat is found in animal products such as milk, cheese, and meat. Limiting these foods may help you lose weight and also lower your risk for heart disease. · Do not smoke. Smoking increases your risk for heart attack and stroke. If you need help quitting, talk to your doctor about stop-smoking programs and medicines. These can increase your chances of quitting for good. When should you call for help? Call 911 anytime you think you may need emergency care. This may mean having symptoms that suggest that your blood pressure is causing a serious heart or blood vessel problem. Your blood pressure may be over 180/110. 
 For example, call 911 if: 
  · You have symptoms of a heart attack. These may include: ¨ Chest pain or pressure, or a strange feeling in the chest. 
¨ Sweating. ¨ Shortness of breath. ¨ Nausea or vomiting. ¨ Pain, pressure, or a strange feeling in the back, neck, jaw, or upper belly or in one or both shoulders or arms. ¨ Lightheadedness or sudden weakness. ¨ A fast or irregular heartbeat.   · You have symptoms of a stroke. These may include: 
¨ Sudden numbness, tingling, weakness, or loss of movement in your face, arm, or leg, especially on only one side of your body. ¨ Sudden vision changes. ¨ Sudden trouble speaking. ¨ Sudden confusion or trouble understanding simple statements. ¨ Sudden problems with walking or balance. ¨ A sudden, severe headache that is different from past headaches.  
  · You have severe back or belly pain.  
 Do not wait until your blood pressure comes down on its own. Get help right away. 
 Call your doctor now or seek immediate care if: 
  · Your blood pressure is much higher than normal (such as 180/110 or higher), but you don't have symptoms.  
  · You think high blood pressure is causing symptoms, such as: ¨ Severe headache. ¨ Blurry vision.  
 Watch closely for changes in your health, and be sure to contact your doctor if: 
  · Your blood pressure measures 140/90 or higher at least 2 times. That means the top number is 140 or higher or the bottom number is 90 or higher, or both.  
  · You think you may be having side effects from your blood pressure medicine.  
  · Your blood pressure is usually normal, but it goes above normal at least 2 times. Where can you learn more? Go to http://kings-junito.info/. Enter P262 in the search box to learn more about \"High Blood Pressure: Care Instructions. \" Current as of: December 6, 2017 Content Version: 11.7 © 6318-2088 [employer]. Care instructions adapted under license by [employer] (which disclaims liability or warranty for this information). If you have questions about a medical condition or this instruction, always ask your healthcare professional. [place] [address] any warranty or liability for your use of this information. Low Sodium Diet (2,000 Milligram): Care Instructions Your Care Instructions Too much sodium causes your body to hold on to extra water. This can raise your blood pressure and force your heart and kidneys to work harder. In very serious cases, this could cause you to be put in the hospital. It might even be life-threatening. By limiting sodium, you will feel better and lower your risk of serious problems. The most common source of sodium is salt. People get most of the salt in their diet from canned, prepared, and packaged foods. Fast food and restaurant meals also are very high in sodium. Your doctor will probably limit your sodium to less than 2,000 milligrams (mg) a day. This limit counts all the sodium in prepared and packaged foods and any salt you add to your food. Follow-up care is a key part of your treatment and safety. Be sure to make and go to all appointments, and call your doctor if you are having problems. It's also a good idea to know your test results and keep a list of the medicines you take. How can you care for yourself at home? Read food labels · Read labels on cans and food packages. The labels tell you how much sodium is in each serving. Make sure that you look at the serving size. If you eat more than the serving size, you have eaten more sodium. · Food labels also tell you the Percent Daily Value for sodium. Choose products with low Percent Daily Values for sodium. · Be aware that sodium can come in forms other than salt, including monosodium glutamate (MSG), sodium citrate, and sodium bicarbonate (baking soda). MSG is often added to Asian food. When you eat out, you can sometimes ask for food without MSG or added salt. Buy low-sodium foods · Buy foods that are labeled \"unsalted\" (no salt added), \"sodium-free\" (less than 5 mg of sodium per serving), or \"low-sodium\" (less than 140 mg of sodium per serving). Foods labeled \"reduced-sodium\" and \"light sodium\" may still have too much sodium. Be sure to read the label to see how much sodium you are getting. · Buy fresh vegetables, or frozen vegetables without added sauces. Buy low-sodium versions of canned vegetables, soups, and other canned goods. Prepare low-sodium meals · Cut back on the amount of salt you use in cooking. This will help you adjust to the taste. Do not add salt after cooking. One teaspoon of salt has about 2,300 mg of sodium. · Take the salt shaker off the table. · Flavor your food with garlic, lemon juice, onion, vinegar, herbs, and spices. Do not use soy sauce, lite soy sauce, steak sauce, onion salt, garlic salt, celery salt, mustard, or ketchup on your food. · Use low-sodium salad dressings, sauces, and ketchup. Or make your own salad dressings and sauces without adding salt. · Use less salt (or none) when recipes call for it. You can often use half the salt a recipe calls for without losing flavor. Other foods such as rice, pasta, and grains do not need added salt. · Rinse canned vegetables, and cook them in fresh water. This removes some-but not all-of the salt. · Avoid water that is naturally high in sodium or that has been treated with water softeners, which add sodium. Call your local water company to find out the sodium content of your water supply. If you buy bottled water, read the label and choose a sodium-free brand. Avoid high-sodium foods · Avoid eating: ¨ Smoked, cured, salted, and canned meat, fish, and poultry. ¨ Ham, ravi, hot dogs, and luncheon meats. ¨ Regular, hard, and processed cheese and regular peanut butter. ¨ Crackers with salted tops, and other salted snack foods such as pretzels, chips, and salted popcorn. ¨ Frozen prepared meals, unless labeled low-sodium. ¨ Canned and dried soups, broths, and bouillon, unless labeled sodium-free or low-sodium. ¨ Canned vegetables, unless labeled sodium-free or low-sodium. ¨ Michaela Mariana fries, pizza, tacos, and other fast foods.  
¨ Pickles, olives, ketchup, and other condiments, especially soy sauce, unless labeled sodium-free or low-sodium. Where can you learn more? Go to http://kings-junito.info/. Enter V174 in the search box to learn more about \"Low Sodium Diet (2,000 Milligram): Care Instructions. \" Current as of: May 12, 2017 Content Version: 11.7 © 5938-1989 [employer]. Care instructions adapted under license by [employer] (which disclaims liability or warranty for this information). If you have questions about a medical condition or this instruction, always ask your healthcare professional. [place] [address] any warranty or liability for your use of this information. Joint Pain: Care Instructions Your Care Instructions Many people have small aches and pains from overuse or injury to muscles and joints. Joint injuries often happen during sports or recreation, work tasks, or projects around the home. An overuse injury can happen when you put too much stress on a joint or when you do an activity that stresses the joint over and over, such as using the computer or rowing a boat. You can take action at home to help your muscles and joints get better. You should feel better in 1 to 2 weeks, but it can take 3 months or more to heal completely. Follow-up care is a key part of your treatment and safety. Be sure to make and go to all appointments, and call your doctor if you are having problems. It's also a good idea to know your test results and keep a list of the medicines you take. How can you care for yourself at home? · Do not put weight on the injured joint for at least a day or two. · For the first day or two after an injury, do not take hot showers or baths, and do not use hot packs. The heat could make swelling worse. · Put ice or a cold pack on the sore joint for 10 to 20 minutes at a time.  Try to do this every 1 to 2 hours for the next 3 days (when you are awake) or until the swelling goes down. Put a thin cloth between the ice and your skin. · Wrap the injury in an elastic bandage. Do not wrap it too tightly because this can cause more swelling. · Prop up the sore joint on a pillow when you ice it or anytime you sit or lie down during the next 3 days. Try to keep it above the level of your heart. This will help reduce swelling. · Take an over-the-counter pain medicine, such as acetaminophen (Tylenol), ibuprofen (Advil, Motrin), or naproxen (Aleve). Read and follow all instructions on the label. · After 1 or 2 days of rest, begin moving the joint gently. While the joint is still healing, you can begin to exercise using activities that do not strain or hurt the painful joint. When should you call for help? Call your doctor now or seek immediate medical care if: 
  · You have signs of infection, such as: 
¨ Increased pain, swelling, warmth, and redness. ¨ Red streaks leading from the joint. ¨ A fever.  
 Watch closely for changes in your health, and be sure to contact your doctor if: 
  · Your movement or symptoms are not getting better after 1 to 2 weeks of home treatment. Where can you learn more? Go to http://kings-junito.info/. Enter P205 in the search box to learn more about \"Joint Pain: Care Instructions. \" Current as of: November 29, 2017 Content Version: 11.7 © 1073-8371 [employer]. Care instructions adapted under license by [employer] (which disclaims liability or warranty for this information). If you have questions about a medical condition or this instruction, always ask your healthcare professional. [place] [address] any warranty or liability for your use of this information. Well Visit, Women 48 to 72: Care Instructions Your Care Instructions Physical exams can help you stay healthy.  Your doctor has checked your overall health and may have suggested ways to take good care of yourself. He or she also may have recommended tests. At home, you can help prevent illness with healthy eating, regular exercise, and other steps. Follow-up care is a key part of your treatment and safety. Be sure to make and go to all appointments, and call your doctor if you are having problems. It's also a good idea to know your test results and keep a list of the medicines you take. How can you care for yourself at home? · Reach and stay at a healthy weight. This will lower your risk for many problems, such as obesity, diabetes, heart disease, and high blood pressure. · Get at least 30 minutes of exercise on most days of the week. Walking is a good choice. You also may want to do other activities, such as running, swimming, cycling, or playing tennis or team sports. · Do not smoke. Smoking can make health problems worse. If you need help quitting, talk to your doctor about stop-smoking programs and medicines. These can increase your chances of quitting for good. · Protect your skin from too much sun. When you're outdoors from 10 a.m. to 4 p.m., stay in the shade or cover up with clothing and a hat with a wide brim. Wear sunglasses that block UV rays. Even when it's cloudy, put broad-spectrum sunscreen (SPF 30 or higher) on any exposed skin. · See a dentist one or two times a year for checkups and to have your teeth cleaned. · Wear a seat belt in the car. · Limit alcohol to 1 drink a day. Too much alcohol can cause health problems. Follow your doctor's advice about when to have certain tests. These tests can spot problems early. · Cholesterol. Your doctor will tell you how often to have this done based on your age, family history, or other things that can increase your risk for heart attack and stroke. · Blood pressure.  Have your blood pressure checked during a routine doctor visit. Your doctor will tell you how often to check your blood pressure based on your age, your blood pressure results, and other factors. · Mammogram. Ask your doctor how often you should have a mammogram, which is an X-ray of your breasts. A mammogram can spot breast cancer before it can be felt and when it is easiest to treat. · Pap test and pelvic exam. Ask your doctor how often you should have a Pap test. You may not need to have a Pap test as often as you used to. · Vision. Have your eyes checked every year or two or as often as your doctor suggests. Some experts recommend that you have yearly exams for glaucoma and other age-related eye problems starting at age 48. · Hearing. Tell your doctor if you notice any change in your hearing. You can have tests to find out how well you hear. · Diabetes. Ask your doctor whether you should have tests for diabetes. · Colon cancer. You should begin tests for colon cancer at age 48. You may have one of several tests. Your doctor will tell you how often to have tests based on your age and risk. Risks include whether you already had a precancerous polyp removed from your colon or whether your parents, sisters and brothers, or children have had colon cancer. · Thyroid disease. Talk to your doctor about whether to have your thyroid checked as part of a regular physical exam. Women have an increased chance of a thyroid problem. · Osteoporosis. You should begin tests for bone density at age 72. If you are younger than 72, ask your doctor whether you have factors that may increase your risk for this disease. You may want to have this test before age 72. · Heart attack and stroke risk. At least every 4 to 6 years, you should have your risk for heart attack and stroke assessed. Your doctor uses factors such as your age, blood pressure, cholesterol, and whether you smoke or have diabetes to show what your risk for a heart attack or stroke is over the next 10 years. When should you call for help? Watch closely for changes in your health, and be sure to contact your doctor if you have any problems or symptoms that concern you. Where can you learn more? Go to http://kings-junito.info/. Enter Y638 in the search box to learn more about \"Well Visit, Women 50 to 72: Care Instructions. \" Current as of: May 16, 2017 Content Version: 11.7 © 0472-9244 [employer]. Care instructions adapted under license by [employer] (which disclaims liability or warranty for this information). If you have questions about a medical condition or this instruction, always ask your healthcare professional. [place] [address] any warranty or liability for your use of this information. Breast Self-Exam: Care Instructions Your Care Instructions A breast self-exam is when you check your breasts for lumps or changes. This regular exam helps you learn how your breasts normally look and feel. Most breast problems or changes are not because of cancer. Breast self-exam is not a substitute for a mammogram. Having regular breast exams by your doctor and regular mammograms improve your chances of finding any problems with your breasts. Some women set a time each month to do a step-by-step breast self-exam. Other women like a less formal system. They might look at their breasts as they brush their teeth, or feel their breasts once in a while in the shower. If you notice a change in your breast, tell your doctor. Follow-up care is a key part of your treatment and safety. Be sure to make and go to all appointments, and call your doctor if you are having problems. It's also a good idea to know your test results and keep a list of the medicines you take. How do you do a breast self-exam? 
· The best time to examine your breasts is usually one week after your menstrual period begins. Your breasts should not be tender then.  If you do not have periods, you might do your exam on a day of the month that is easy to remember. · To examine your breasts: ¨ Remove all your clothes above the waist and lie down. When you are lying down, your breast tissue spreads evenly over your chest wall, which makes it easier to feel all your breast tissue. ¨ Use the pads-not the fingertips-of the 3 middle fingers of your left hand to check your right breast. Move your fingers slowly in small coin-sized circles that overlap. ¨ Use three levels of pressure to feel of all your breast tissue. Use light pressure to feel the tissue close to the skin surface. Use medium pressure to feel a little deeper. Use firm pressure to feel your tissue close to your breastbone and ribs. Use each pressure level to feel your breast tissue before moving on to the next spot. ¨ Check your entire breast, moving up and down as if following a strip from the collarbone to the bra line, and from the armpit to the ribs. Repeat until you have covered the entire breast. 
¨ Repeat this procedure for your left breast, using the pads of the 3 middle fingers of your right hand. · To examine your breasts while in the shower: 
¨ Place one arm over your head and lightly soap your breast on that side. ¨ Using the pads of your fingers, gently move your hand over your breast (in the strip pattern described above), feeling carefully for any lumps or changes. ¨ Repeat for the other breast. 
· Have your doctor inspect anything you notice to see if you need further testing. Where can you learn more? Go to http://kings-junito.info/. Enter P148 in the search box to learn more about \"Breast Self-Exam: Care Instructions. \" Current as of: May 12, 2017 Content Version: 11.7 © 4670-0348 [employer]. Care instructions adapted under license by [employer] (which disclaims liability or warranty for this information).  If you have questions about a medical condition or this instruction, always ask your healthcare professional. [place] [address] any warranty or liability for your use of this information. Introducing [facility]! Antony Lee introduces TenderTree patient portal. Now you can access parts of your medical record, email your doctor's office, and request medication refills online. 1. In your internet browser, go to [email or web address] 2. Click on the First Time User? Click Here link in the Sign In box. You will see the New Member Sign Up page. 3. Enter your TenderTree Access Code exactly as it appears below. You will not need to use this code after youve completed the sign-up process. If you do not sign up before the expiration date, you must request a new code. · TenderTree Access Code: [email or web address] Expires: 10/8/2018  1:22 PM 
 
4. Enter the last four digits of your Social Security Number (xxxx) and Date of Birth (mm/dd/yyyy) as indicated and click Submit. You will be taken to the next sign-up page. 5. Create a TenderTree ID. This will be your TenderTree login ID and cannot be changed, so think of one that is secure and easy to remember. 6. Create a TenderTree password. You can change your password at any time. 7. Enter your Password Reset Question and Answer. This can be used at a later time if you forget your password. 8. Enter your e-mail address. You will receive e-mail notification when new information is available in [address] 9. Click Sign Up. You can now view and download portions of your medical record. 10. Click the Download Summary menu link to download a portable copy of your medical information. If you have questions, please visit the Frequently Asked Questions section of the TenderTree website. Remember, TenderTree is NOT to be used for urgent needs. For medical emergencies, dial 911. Now available from your iPhone and Android! Please provide this summary of care documentation to your next provider. Your primary care clinician is listed as Zev Sagastume. If you have any questions after today's visit, please call [phone].

## 2018-07-10 NOTE — PATIENT INSTRUCTIONS
High Blood Pressure: Care Instructions  Your Care Instructions    If your blood pressure is usually above 130/80, you have high blood pressure, or hypertension. That means the top number is 130 or higher or the bottom number is 80 or higher, or both. Despite what a lot of people think, high blood pressure usually doesn't cause headaches or make you feel dizzy or lightheaded. It usually has no symptoms. But it does increase your risk for heart attack, stroke, and kidney or eye damage. The higher your blood pressure, the more your risk increases. Your doctor will give you a goal for your blood pressure. Your goal will be based on your health and your age. Lifestyle changes, such as eating healthy and being active, are always important to help lower blood pressure. You might also take medicine to reach your blood pressure goal.  Follow-up care is a key part of your treatment and safety. Be sure to make and go to all appointments, and call your doctor if you are having problems. It's also a good idea to know your test results and keep a list of the medicines you take. How can you care for yourself at home? Medical treatment  · If you stop taking your medicine, your blood pressure will go back up. You may take one or more types of medicine to lower your blood pressure. Be safe with medicines. Take your medicine exactly as prescribed. Call your doctor if you think you are having a problem with your medicine. · Talk to your doctor before you start taking aspirin every day. Aspirin can help certain people lower their risk of a heart attack or stroke. But taking aspirin isn't right for everyone, because it can cause serious bleeding. · See your doctor regularly. You may need to see the doctor more often at first or until your blood pressure comes down. · If you are taking blood pressure medicine, talk to your doctor before you take decongestants or anti-inflammatory medicine, such as ibuprofen.  Some of these medicines can raise blood pressure. · Learn how to check your blood pressure at home. Lifestyle changes  · Stay at a healthy weight. This is especially important if you put on weight around the waist. Losing even 10 pounds can help you lower your blood pressure. · If your doctor recommends it, get more exercise. Walking is a good choice. Bit by bit, increase the amount you walk every day. Try for at least 30 minutes on most days of the week. You also may want to swim, bike, or do other activities. · Avoid or limit alcohol. Talk to your doctor about whether you can drink any alcohol. · Try to limit how much sodium you eat to less than 2,300 milligrams (mg) a day. Your doctor may ask you to try to eat less than 1,500 mg a day. · Eat plenty of fruits (such as bananas and oranges), vegetables, legumes, whole grains, and low-fat dairy products. · Lower the amount of saturated fat in your diet. Saturated fat is found in animal products such as milk, cheese, and meat. Limiting these foods may help you lose weight and also lower your risk for heart disease. · Do not smoke. Smoking increases your risk for heart attack and stroke. If you need help quitting, talk to your doctor about stop-smoking programs and medicines. These can increase your chances of quitting for good. When should you call for help? Call 911 anytime you think you may need emergency care. This may mean having symptoms that suggest that your blood pressure is causing a serious heart or blood vessel problem. Your blood pressure may be over 180/110.   For example, call 911 if:    · You have symptoms of a heart attack. These may include:  ¨ Chest pain or pressure, or a strange feeling in the chest.  ¨ Sweating. ¨ Shortness of breath. ¨ Nausea or vomiting. ¨ Pain, pressure, or a strange feeling in the back, neck, jaw, or upper belly or in one or both shoulders or arms. ¨ Lightheadedness or sudden weakness.   ¨ A fast or irregular heartbeat.     · You have symptoms of a stroke. These may include:  ¨ Sudden numbness, tingling, weakness, or loss of movement in your face, arm, or leg, especially on only one side of your body. ¨ Sudden vision changes. ¨ Sudden trouble speaking. ¨ Sudden confusion or trouble understanding simple statements. ¨ Sudden problems with walking or balance. ¨ A sudden, severe headache that is different from past headaches.     · You have severe back or belly pain.    Do not wait until your blood pressure comes down on its own. Get help right away.   Call your doctor now or seek immediate care if:    · Your blood pressure is much higher than normal (such as 180/110 or higher), but you don't have symptoms.     · You think high blood pressure is causing symptoms, such as:  ¨ Severe headache. ¨ Blurry vision.    Watch closely for changes in your health, and be sure to contact your doctor if:    · Your blood pressure measures 140/90 or higher at least 2 times. That means the top number is 140 or higher or the bottom number is 90 or higher, or both.     · You think you may be having side effects from your blood pressure medicine.     · Your blood pressure is usually normal, but it goes above normal at least 2 times. Where can you learn more? Go to http://kings-junito.info/. Enter O491 in the search box to learn more about \"High Blood Pressure: Care Instructions. \"  Current as of: December 6, 2017  Content Version: 11.7  © 8477-3767 [employer]. Care instructions adapted under license by [employer] (which disclaims liability or warranty for this information). If you have questions about a medical condition or this instruction, always ask your healthcare professional. [place] [address] any warranty or liability for your use of this information.        Low Sodium Diet (2,000 Milligram): Care Instructions  Your Care Instructions    Too much sodium causes your body to hold on to extra water. This can raise your blood pressure and force your heart and kidneys to work harder. In very serious cases, this could cause you to be put in the hospital. It might even be life-threatening. By limiting sodium, you will feel better and lower your risk of serious problems. The most common source of sodium is salt. People get most of the salt in their diet from canned, prepared, and packaged foods. Fast food and restaurant meals also are very high in sodium. Your doctor will probably limit your sodium to less than 2,000 milligrams (mg) a day. This limit counts all the sodium in prepared and packaged foods and any salt you add to your food. Follow-up care is a key part of your treatment and safety. Be sure to make and go to all appointments, and call your doctor if you are having problems. It's also a good idea to know your test results and keep a list of the medicines you take. How can you care for yourself at home? Read food labels  · Read labels on cans and food packages. The labels tell you how much sodium is in each serving. Make sure that you look at the serving size. If you eat more than the serving size, you have eaten more sodium. · Food labels also tell you the Percent Daily Value for sodium. Choose products with low Percent Daily Values for sodium. · Be aware that sodium can come in forms other than salt, including monosodium glutamate (MSG), sodium citrate, and sodium bicarbonate (baking soda). MSG is often added to Asian food. When you eat out, you can sometimes ask for food without MSG or added salt. Buy low-sodium foods  · Buy foods that are labeled \"unsalted\" (no salt added), \"sodium-free\" (less than 5 mg of sodium per serving), or \"low-sodium\" (less than 140 mg of sodium per serving). Foods labeled \"reduced-sodium\" and \"light sodium\" may still have too much sodium. Be sure to read the label to see how much sodium you are getting.   · Buy fresh vegetables, or frozen vegetables without added sauces. Buy low-sodium versions of canned vegetables, soups, and other canned goods. Prepare low-sodium meals  · Cut back on the amount of salt you use in cooking. This will help you adjust to the taste. Do not add salt after cooking. One teaspoon of salt has about 2,300 mg of sodium. · Take the salt shaker off the table. · Flavor your food with garlic, lemon juice, onion, vinegar, herbs, and spices. Do not use soy sauce, lite soy sauce, steak sauce, onion salt, garlic salt, celery salt, mustard, or ketchup on your food. · Use low-sodium salad dressings, sauces, and ketchup. Or make your own salad dressings and sauces without adding salt. · Use less salt (or none) when recipes call for it. You can often use half the salt a recipe calls for without losing flavor. Other foods such as rice, pasta, and grains do not need added salt. · Rinse canned vegetables, and cook them in fresh water. This removes some-but not all-of the salt. · Avoid water that is naturally high in sodium or that has been treated with water softeners, which add sodium. Call your local water company to find out the sodium content of your water supply. If you buy bottled water, read the label and choose a sodium-free brand. Avoid high-sodium foods  · Avoid eating:  ¨ Smoked, cured, salted, and canned meat, fish, and poultry. ¨ Ham, ravi, hot dogs, and luncheon meats. ¨ Regular, hard, and processed cheese and regular peanut butter. ¨ Crackers with salted tops, and other salted snack foods such as pretzels, chips, and salted popcorn. ¨ Frozen prepared meals, unless labeled low-sodium. ¨ Canned and dried soups, broths, and bouillon, unless labeled sodium-free or low-sodium. ¨ Canned vegetables, unless labeled sodium-free or low-sodium. ¨ Western Dina fries, pizza, tacos, and other fast foods. ¨ Pickles, olives, ketchup, and other condiments, especially soy sauce, unless labeled sodium-free or low-sodium. Where can you learn more?   Go to http://kings-junito.info/. Enter S796 in the search box to learn more about \"Low Sodium Diet (2,000 Milligram): Care Instructions. \"  Current as of: May 12, 2017  Content Version: 11.7  © 4538-2667 [employer]Sciro[employer]. Care instructions adapted under license by scroll kit (which disclaims liability or warranty for this information). If you have questions about a medical condition or this instruction, always ask your healthcare professional. [address] any warranty or liability for your use of this information. Joint Pain: Care Instructions  Your Care Instructions    Many people have small aches and pains from overuse or injury to muscles and joints. Joint injuries often happen during sports or recreation, work tasks, or projects around the home. An overuse injury can happen when you put too much stress on a joint or when you do an activity that stresses the joint over and over, such as using the computer or rowing a boat. You can take action at home to help your muscles and joints get better. You should feel better in 1 to 2 weeks, but it can take 3 months or more to heal completely. Follow-up care is a key part of your treatment and safety. Be sure to make and go to all appointments, and call your doctor if you are having problems. It's also a good idea to know your test results and keep a list of the medicines you take. How can you care for yourself at home? · Do not put weight on the injured joint for at least a day or two. · For the first day or two after an injury, do not take hot showers or baths, and do not use hot packs. The heat could make swelling worse. · Put ice or a cold pack on the sore joint for 10 to 20 minutes at a time. Try to do this every 1 to 2 hours for the next 3 days (when you are awake) or until the swelling goes down. Put a thin cloth between the ice and your skin. · Wrap the injury in an elastic bandage.  Do not wrap it too tightly because this can cause more swelling. · Prop up the sore joint on a pillow when you ice it or anytime you sit or lie down during the next 3 days. Try to keep it above the level of your heart. This will help reduce swelling. · Take an over-the-counter pain medicine, such as acetaminophen (Tylenol), ibuprofen (Advil, Motrin), or naproxen (Aleve). Read and follow all instructions on the label. · After 1 or 2 days of rest, begin moving the joint gently. While the joint is still healing, you can begin to exercise using activities that do not strain or hurt the painful joint. When should you call for help? Call your doctor now or seek immediate medical care if:    · You have signs of infection, such as:  ¨ Increased pain, swelling, warmth, and redness. ¨ Red streaks leading from the joint. ¨ A fever.    Watch closely for changes in your health, and be sure to contact your doctor if:    · Your movement or symptoms are not getting better after 1 to 2 weeks of home treatment. Where can you learn more? Go to http://ikngs-junito.info/. Enter P205 in the search box to learn more about \"Joint Pain: Care Instructions. \"  Current as of: November 29, 2017  Content Version: 11.7  © 9995-2446 CleverMiles. Care instructions adapted under license by [employer] (which disclaims liability or warranty for this information). If you have questions about a medical condition or this instruction, always ask your healthcare professional. [place] [address] any warranty or liability for your use of this information. Well Visit, Women 48 to 72: Care Instructions  Your Care Instructions    Physical exams can help you stay healthy. Your doctor has checked your overall health and may have suggested ways to take good care of yourself. He or she also may have recommended tests.  At home, you can help prevent illness with healthy eating, regular exercise, and other steps. Follow-up care is a key part of your treatment and safety. Be sure to make and go to all appointments, and call your doctor if you are having problems. It's also a good idea to know your test results and keep a list of the medicines you take. How can you care for yourself at home? · Reach and stay at a healthy weight. This will lower your risk for many problems, such as obesity, diabetes, heart disease, and high blood pressure. · Get at least 30 minutes of exercise on most days of the week. Walking is a good choice. You also may want to do other activities, such as running, swimming, cycling, or playing tennis or team sports. · Do not smoke. Smoking can make health problems worse. If you need help quitting, talk to your doctor about stop-smoking programs and medicines. These can increase your chances of quitting for good. · Protect your skin from too much sun. When you're outdoors from 10 a.m. to 4 p.m., stay in the shade or cover up with clothing and a hat with a wide brim. Wear sunglasses that block UV rays. Even when it's cloudy, put broad-spectrum sunscreen (SPF 30 or higher) on any exposed skin. · See a dentist one or two times a year for checkups and to have your teeth cleaned. · Wear a seat belt in the car. · Limit alcohol to 1 drink a day. Too much alcohol can cause health problems. Follow your doctor's advice about when to have certain tests. These tests can spot problems early. · Cholesterol. Your doctor will tell you how often to have this done based on your age, family history, or other things that can increase your risk for heart attack and stroke. · Blood pressure. Have your blood pressure checked during a routine doctor visit. Your doctor will tell you how often to check your blood pressure based on your age, your blood pressure results, and other factors. · Mammogram. Ask your doctor how often you should have a mammogram, which is an X-ray of your breasts.  A mammogram can spot breast cancer before it can be felt and when it is easiest to treat. · Pap test and pelvic exam. Ask your doctor how often you should have a Pap test. You may not need to have a Pap test as often as you used to. · Vision. Have your eyes checked every year or two or as often as your doctor suggests. Some experts recommend that you have yearly exams for glaucoma and other age-related eye problems starting at age 48. · Hearing. Tell your doctor if you notice any change in your hearing. You can have tests to find out how well you hear. · Diabetes. Ask your doctor whether you should have tests for diabetes. · Colon cancer. You should begin tests for colon cancer at age 48. You may have one of several tests. Your doctor will tell you how often to have tests based on your age and risk. Risks include whether you already had a precancerous polyp removed from your colon or whether your parents, sisters and brothers, or children have had colon cancer. · Thyroid disease. Talk to your doctor about whether to have your thyroid checked as part of a regular physical exam. Women have an increased chance of a thyroid problem. · Osteoporosis. You should begin tests for bone density at age 72. If you are younger than 72, ask your doctor whether you have factors that may increase your risk for this disease. You may want to have this test before age 72. · Heart attack and stroke risk. At least every 4 to 6 years, you should have your risk for heart attack and stroke assessed. Your doctor uses factors such as your age, blood pressure, cholesterol, and whether you smoke or have diabetes to show what your risk for a heart attack or stroke is over the next 10 years. When should you call for help? Watch closely for changes in your health, and be sure to contact your doctor if you have any problems or symptoms that concern you. Where can you learn more? Go to http://kings-junito.info/.   Enter J015 in the search box to learn more about \"Well Visit, Women 50 to 72: Care Instructions. \"  Current as of: May 16, 2017  Content Version: 11.7  © 5277-0954 CITYBIZLIST. Care instructions adapted under license by [employer] (which disclaims liability or warranty for this information). If you have questions about a medical condition or this instruction, always ask your healthcare professional. [address] any warranty or liability for your use of this information. Breast Self-Exam: Care Instructions  Your Care Instructions    A breast self-exam is when you check your breasts for lumps or changes. This regular exam helps you learn how your breasts normally look and feel. Most breast problems or changes are not because of cancer. Breast self-exam is not a substitute for a mammogram. Having regular breast exams by your doctor and regular mammograms improve your chances of finding any problems with your breasts. Some women set a time each month to do a step-by-step breast self-exam. Other women like a less formal system. They might look at their breasts as they brush their teeth, or feel their breasts once in a while in the shower. If you notice a change in your breast, tell your doctor. Follow-up care is a key part of your treatment and safety. Be sure to make and go to all appointments, and call your doctor if you are having problems. It's also a good idea to know your test results and keep a list of the medicines you take. How do you do a breast self-exam?  · The best time to examine your breasts is usually one week after your menstrual period begins. Your breasts should not be tender then. If you do not have periods, you might do your exam on a day of the month that is easy to remember. · To examine your breasts:  ¨ Remove all your clothes above the waist and lie down.  When you are lying down, your breast tissue spreads evenly over your chest wall, which makes it easier to feel all your breast tissue. ¨ Use the pads-not the fingertips-of the 3 middle fingers of your left hand to check your right breast. Move your fingers slowly in small coin-sized circles that overlap. ¨ Use three levels of pressure to feel of all your breast tissue. Use light pressure to feel the tissue close to the skin surface. Use medium pressure to feel a little deeper. Use firm pressure to feel your tissue close to your breastbone and ribs. Use each pressure level to feel your breast tissue before moving on to the next spot. ¨ Check your entire breast, moving up and down as if following a strip from the collarbone to the bra line, and from the armpit to the ribs. Repeat until you have covered the entire breast.  ¨ Repeat this procedure for your left breast, using the pads of the 3 middle fingers of your right hand. · To examine your breasts while in the shower:  ¨ Place one arm over your head and lightly soap your breast on that side. ¨ Using the pads of your fingers, gently move your hand over your breast (in the strip pattern described above), feeling carefully for any lumps or changes. ¨ Repeat for the other breast.  · Have your doctor inspect anything you notice to see if you need further testing. Where can you learn more? Go to http://kings-junito.info/. Enter P148 in the search box to learn more about \"Breast Self-Exam: Care Instructions. \"  Current as of: May 12, 2017  Content Version: 11.7  © 8354-5575 [employer]. Care instructions adapted under license by [employer] (which disclaims liability or warranty for this information). If you have questions about a medical condition or this instruction, always ask your healthcare professional. [place] [address] any warranty or liability for your use of this information.

## 2018-07-10 NOTE — PROGRESS NOTES
1. Have you been to the ER, urgent care clinic since your last visit? Hospitalized since your last visit? No    2. Have you seen or consulted any other health care providers outside of the [facility] since your last visit? Include any pap smears or colon screening. No    Patient presents for annual pap smear. Last pap 3/03/15 Abnormal Pap smears No.  Procedures if indicated No.Form of contraception tubal ligation. Mammogram (if indicated) 8/24/17. Family history of breast CA (Mother, Sister), colon CA No, cervical CA No.Tetanus 3/31/15.

## 2018-08-09 NOTE — PROGRESS NOTES
Contacted patient and verified identity using name and date of birth (2- identifiers)  Spoke with patient and she verbalized understanding of negative pap smear and plan to recheck in 3 years. Annual HM visits.

## 2018-09-03 DIAGNOSIS — G47.9 SLEEP TROUBLE: ICD-10-CM

## 2018-09-04 RX ORDER — TRAZODONE HYDROCHLORIDE 50 MG/1
TABLET ORAL
Qty: 90 TAB | Refills: 0 | Status: SHIPPED | OUTPATIENT
Start: 2018-09-04 | End: 2018-12-20 | Stop reason: SDUPTHER

## 2018-11-07 DIAGNOSIS — M25.549 ARTHRALGIA OF HAND, UNSPECIFIED LATERALITY: ICD-10-CM

## 2018-11-08 ENCOUNTER — HOSPITAL ENCOUNTER (OUTPATIENT)
Dept: MAMMOGRAPHY | Age: 53
Discharge: HOME | End: 2018-11-08
Attending: FAMILY MEDICINE
Payer: OTHER GOVERNMENT

## 2018-11-08 DIAGNOSIS — Z12.31 VISIT FOR SCREENING MAMMOGRAM: ICD-10-CM

## 2018-11-08 PROCEDURE — 77067 SCR MAMMO BI INCL CAD: CPT

## 2018-11-08 RX ORDER — NAPROXEN 500 MG/1
TABLET ORAL
Qty: 60 TAB | Refills: 0 | Status: SHIPPED | OUTPATIENT
Start: 2018-11-08 | End: 2018-12-08

## 2018-12-07 ENCOUNTER — APPOINTMENT (OUTPATIENT)
Dept: NON INVASIVE DIAGNOSTICS | Age: 53
DRG: 282 | End: 2018-12-07
Attending: INTERNAL MEDICINE
Payer: OTHER GOVERNMENT

## 2018-12-07 ENCOUNTER — APPOINTMENT (OUTPATIENT)
Dept: GENERAL RADIOLOGY | Age: 53
DRG: 282 | End: 2018-12-07
Attending: EMERGENCY MEDICINE
Payer: OTHER GOVERNMENT

## 2018-12-07 ENCOUNTER — HOSPITAL ENCOUNTER (INPATIENT)
Age: 53
LOS: 1 days | Discharge: HOME | DRG: 282 | End: 2018-12-08
Attending: EMERGENCY MEDICINE | Admitting: HOSPITALIST
Payer: OTHER GOVERNMENT

## 2018-12-07 DIAGNOSIS — I10 ESSENTIAL HYPERTENSION: ICD-10-CM

## 2018-12-07 DIAGNOSIS — I24.9 ACS (ACUTE CORONARY SYNDROME) (HCC): Primary | ICD-10-CM

## 2018-12-07 DIAGNOSIS — I25.119 CORONARY ARTERY DISEASE WITH ANGINA PECTORIS, UNSPECIFIED VESSEL OR LESION TYPE, UNSPECIFIED WHETHER NATIVE OR TRANSPLANTED HEART (HCC): ICD-10-CM

## 2018-12-07 PROBLEM — R07.89 ATYPICAL CHEST PAIN: Status: ACTIVE | Noted: 2018-12-07

## 2018-12-07 LAB
ANION GAP SERPL CALC-SCNC: 9 MMOL/L (ref 3–18)
ATRIAL RATE: 60 BPM
BASOPHILS # BLD: 0 K/UL (ref 0–0.1)
BASOPHILS NFR BLD: 0 % (ref 0–2)
BUN SERPL-MCNC: 12 MG/DL (ref 7–18)
BUN/CREAT SERPL: 17 (ref 12–20)
CALCIUM SERPL-MCNC: 8.7 MG/DL (ref 8.5–10.1)
CALCULATED P AXIS, ECG09: 64 DEGREES
CALCULATED R AXIS, ECG10: 20 DEGREES
CALCULATED T AXIS, ECG11: 16 DEGREES
CHLORIDE SERPL-SCNC: 105 MMOL/L (ref 100–108)
CK MB CFR SERPL CALC: 1.3 % (ref 0–4)
CK MB CFR SERPL CALC: 3.2 % (ref 0–4)
CK MB SERPL-MCNC: 10.3 NG/ML (ref 5–25)
CK MB SERPL-MCNC: 2.2 NG/ML (ref 5–25)
CK SERPL-CCNC: 165 U/L (ref 26–192)
CK SERPL-CCNC: 319 U/L (ref 26–192)
CO2 SERPL-SCNC: 27 MMOL/L (ref 21–32)
CREAT SERPL-MCNC: 0.71 MG/DL (ref 0.6–1.3)
D DIMER PPP FEU-MCNC: 0.3 UG/ML(FEU)
DIAGNOSIS, 93000: NORMAL
DIFFERENTIAL METHOD BLD: ABNORMAL
ECHO AO ROOT DIAM: 3.33 CM
ECHO LA VOL BP: 57.49 ML (ref 22–52)
ECHO LA VOL/BSA BIPLANE: 32.81 ML/M2 (ref 16–28)
ECHO LV E' LATERAL VELOCITY: 8 CM/S
ECHO LV E' SEPTAL VELOCITY: 10 CM/S
ECHO LV INTERNAL DIMENSION DIASTOLIC: 4 CM (ref 3.9–5.3)
ECHO LV INTERNAL DIMENSION SYSTOLIC: 2.2 CM
ECHO LV IVSD: 1.2 CM (ref 0.6–0.9)
ECHO LV POSTERIOR WALL DIASTOLIC: 1.1 CM (ref 0.6–0.9)
ECHO LVOT DIAM: 1.9 CM
ECHO LVOT PEAK GRADIENT: 2 MMHG
ECHO LVOT VTI: 21.7 CM
ECHO MV A VELOCITY: 102 CM/S
ECHO MV E DECELERATION TIME (DT): 150 MS
ECHO MV E VELOCITY: 93 CM/S
ECHO MV E/A RATIO: 0.91
ECHO MV E/E' LATERAL: 11.63
ECHO MV E/E' RATIO (AVERAGED): 10.46
ECHO MV E/E' SEPTAL: 9.3
ECHO PULMONARY ARTERY SYSTOLIC PRESSURE (PASP): 43 MMHG
ECHO RV TAPSE: 2.7 CM (ref 1.5–2)
ECHO TV REGURGITANT PEAK GRADIENT: 35 MMHG
END DIASTOLIC PRESSURE: 18
EOSINOPHIL # BLD: 0.4 K/UL (ref 0–0.4)
EOSINOPHIL NFR BLD: 5 % (ref 0–5)
ERYTHROCYTE [DISTWIDTH] IN BLOOD BY AUTOMATED COUNT: 12.9 % (ref 11.6–14.5)
GLUCOSE SERPL-MCNC: 110 MG/DL (ref 74–99)
HCT VFR BLD AUTO: 34 % (ref 35–45)
HGB BLD-MCNC: 11.3 G/DL (ref 12–16)
LYMPHOCYTES # BLD: 2.8 K/UL (ref 0.9–3.6)
LYMPHOCYTES NFR BLD: 31 % (ref 21–52)
MCH RBC QN AUTO: 28.8 PG (ref 24–34)
MCHC RBC AUTO-ENTMCNC: 33.2 G/DL (ref 31–37)
MCV RBC AUTO: 86.5 FL (ref 74–97)
MONOCYTES # BLD: 0.7 K/UL (ref 0.05–1.2)
MONOCYTES NFR BLD: 8 % (ref 3–10)
NEUTS SEG # BLD: 5 K/UL (ref 1.8–8)
NEUTS SEG NFR BLD: 56 % (ref 40–73)
P-R INTERVAL, ECG05: 176 MS
PLATELET # BLD AUTO: 306 K/UL (ref 135–420)
PMV BLD AUTO: 10.7 FL (ref 9.2–11.8)
POTASSIUM SERPL-SCNC: 3.5 MMOL/L (ref 3.5–5.5)
Q-T INTERVAL, ECG07: 408 MS
QRS DURATION, ECG06: 70 MS
QTC CALCULATION (BEZET), ECG08: 408 MS
RBC # BLD AUTO: 3.93 M/UL (ref 4.2–5.3)
SODIUM SERPL-SCNC: 141 MMOL/L (ref 136–145)
TROPONIN I SERPL-MCNC: 0.66 NG/ML (ref 0–0.06)
TROPONIN I SERPL-MCNC: 13.4 NG/ML (ref 0–0.04)
VENTRICULAR RATE, ECG03: 60 BPM
WBC # BLD AUTO: 8.8 K/UL (ref 4.6–13.2)

## 2018-12-07 PROCEDURE — 74011250636 HC RX REV CODE- 250/636: Performed by: HOSPITALIST

## 2018-12-07 PROCEDURE — 96372 THER/PROPH/DIAG INJ SC/IM: CPT

## 2018-12-07 PROCEDURE — 74011250636 HC RX REV CODE- 250/636: Performed by: INTERNAL MEDICINE

## 2018-12-07 PROCEDURE — 74011000250 HC RX REV CODE- 250: Performed by: INTERNAL MEDICINE

## 2018-12-07 PROCEDURE — C1894 INTRO/SHEATH, NON-LASER: HCPCS | Performed by: INTERNAL MEDICINE

## 2018-12-07 PROCEDURE — 74011250637 HC RX REV CODE- 250/637: Performed by: EMERGENCY MEDICINE

## 2018-12-07 PROCEDURE — B2111ZZ FLUOROSCOPY OF MULTIPLE CORONARY ARTERIES USING LOW OSMOLAR CONTRAST: ICD-10-PCS | Performed by: INTERNAL MEDICINE

## 2018-12-07 PROCEDURE — 74011250636 HC RX REV CODE- 250/636

## 2018-12-07 PROCEDURE — 82553 CREATINE MB FRACTION: CPT

## 2018-12-07 PROCEDURE — 80048 BASIC METABOLIC PNL TOTAL CA: CPT

## 2018-12-07 PROCEDURE — 74011000258 HC RX REV CODE- 258: Performed by: EMERGENCY MEDICINE

## 2018-12-07 PROCEDURE — 65660000000 HC RM CCU STEPDOWN

## 2018-12-07 PROCEDURE — 77030029997 HC DEV COM RDL R BND TELE -B: Performed by: INTERNAL MEDICINE

## 2018-12-07 PROCEDURE — 85379 FIBRIN DEGRADATION QUANT: CPT

## 2018-12-07 PROCEDURE — 99285 EMERGENCY DEPT VISIT HI MDM: CPT

## 2018-12-07 PROCEDURE — 93458 L HRT ARTERY/VENTRICLE ANGIO: CPT | Performed by: INTERNAL MEDICINE

## 2018-12-07 PROCEDURE — 77030013797 HC KT TRNSDUC PRSSR EDWD -A: Performed by: INTERNAL MEDICINE

## 2018-12-07 PROCEDURE — 74011636320 HC RX REV CODE- 636/320: Performed by: INTERNAL MEDICINE

## 2018-12-07 PROCEDURE — 74011250636 HC RX REV CODE- 250/636: Performed by: EMERGENCY MEDICINE

## 2018-12-07 PROCEDURE — 36415 COLL VENOUS BLD VENIPUNCTURE: CPT

## 2018-12-07 PROCEDURE — B2151ZZ FLUOROSCOPY OF LEFT HEART USING LOW OSMOLAR CONTRAST: ICD-10-PCS | Performed by: INTERNAL MEDICINE

## 2018-12-07 PROCEDURE — 99152 MOD SED SAME PHYS/QHP 5/>YRS: CPT | Performed by: INTERNAL MEDICINE

## 2018-12-07 PROCEDURE — 85025 COMPLETE CBC W/AUTO DIFF WBC: CPT

## 2018-12-07 PROCEDURE — 93306 TTE W/DOPPLER COMPLETE: CPT

## 2018-12-07 PROCEDURE — 74011250637 HC RX REV CODE- 250/637: Performed by: HOSPITALIST

## 2018-12-07 PROCEDURE — 96360 HYDRATION IV INFUSION INIT: CPT

## 2018-12-07 PROCEDURE — 93005 ELECTROCARDIOGRAM TRACING: CPT

## 2018-12-07 PROCEDURE — 71045 X-RAY EXAM CHEST 1 VIEW: CPT

## 2018-12-07 PROCEDURE — 74011250637 HC RX REV CODE- 250/637: Performed by: FAMILY MEDICINE

## 2018-12-07 PROCEDURE — 4A023N7 MEASUREMENT OF CARDIAC SAMPLING AND PRESSURE, LEFT HEART, PERCUTANEOUS APPROACH: ICD-10-PCS | Performed by: INTERNAL MEDICINE

## 2018-12-07 PROCEDURE — 77030015766: Performed by: INTERNAL MEDICINE

## 2018-12-07 RX ORDER — LISINOPRIL 10 MG/1
10 TABLET ORAL
Status: COMPLETED | OUTPATIENT
Start: 2018-12-07 | End: 2018-12-07

## 2018-12-07 RX ORDER — HEPARIN SODIUM 1000 [USP'U]/ML
INJECTION, SOLUTION INTRAVENOUS; SUBCUTANEOUS AS NEEDED
Status: DISCONTINUED | OUTPATIENT
Start: 2018-12-07 | End: 2018-12-07 | Stop reason: HOSPADM

## 2018-12-07 RX ORDER — ENOXAPARIN SODIUM 100 MG/ML
70 INJECTION SUBCUTANEOUS
Status: COMPLETED | OUTPATIENT
Start: 2018-12-07 | End: 2018-12-07

## 2018-12-07 RX ORDER — METOPROLOL TARTRATE 25 MG/1
25 TABLET, FILM COATED ORAL EVERY 12 HOURS
Qty: 60 TAB | Refills: 1 | Status: SHIPPED | OUTPATIENT
Start: 2018-12-07 | End: 2018-12-14

## 2018-12-07 RX ORDER — SODIUM CHLORIDE 9 MG/ML
500 INJECTION, SOLUTION INTRAVENOUS ONCE
Status: COMPLETED | OUTPATIENT
Start: 2018-12-07 | End: 2018-12-07

## 2018-12-07 RX ORDER — ONDANSETRON 2 MG/ML
4 INJECTION INTRAMUSCULAR; INTRAVENOUS
Status: DISCONTINUED | OUTPATIENT
Start: 2018-12-07 | End: 2018-12-08 | Stop reason: HOSPADM

## 2018-12-07 RX ORDER — TRAZODONE HYDROCHLORIDE 50 MG/1
50 TABLET ORAL
Status: DISCONTINUED | OUTPATIENT
Start: 2018-12-07 | End: 2018-12-08 | Stop reason: HOSPADM

## 2018-12-07 RX ORDER — SODIUM CHLORIDE 9 MG/ML
75 INJECTION, SOLUTION INTRAVENOUS CONTINUOUS
Status: DISCONTINUED | OUTPATIENT
Start: 2018-12-07 | End: 2018-12-08 | Stop reason: HOSPADM

## 2018-12-07 RX ORDER — ATORVASTATIN CALCIUM 40 MG/1
40 TABLET, FILM COATED ORAL
Qty: 30 TAB | Refills: 1 | Status: SHIPPED | OUTPATIENT
Start: 2018-12-07 | End: 2018-12-20 | Stop reason: SDUPTHER

## 2018-12-07 RX ORDER — CLOPIDOGREL BISULFATE 75 MG/1
75 TABLET ORAL DAILY
Status: DISCONTINUED | OUTPATIENT
Start: 2018-12-08 | End: 2018-12-08 | Stop reason: HOSPADM

## 2018-12-07 RX ORDER — ENOXAPARIN SODIUM 100 MG/ML
70 INJECTION SUBCUTANEOUS EVERY 12 HOURS
Status: DISCONTINUED | OUTPATIENT
Start: 2018-12-07 | End: 2018-12-07

## 2018-12-07 RX ORDER — ATORVASTATIN CALCIUM 40 MG/1
40 TABLET, FILM COATED ORAL DAILY
Status: DISCONTINUED | OUTPATIENT
Start: 2018-12-07 | End: 2018-12-08 | Stop reason: HOSPADM

## 2018-12-07 RX ORDER — VERAPAMIL HYDROCHLORIDE 2.5 MG/ML
INJECTION, SOLUTION INTRAVENOUS AS NEEDED
Status: DISCONTINUED | OUTPATIENT
Start: 2018-12-07 | End: 2018-12-07 | Stop reason: HOSPADM

## 2018-12-07 RX ORDER — NITROGLYCERIN 0.4 MG/1
0.4 TABLET SUBLINGUAL
Qty: 1 BOTTLE | Refills: 1 | Status: SHIPPED | OUTPATIENT
Start: 2018-12-07

## 2018-12-07 RX ORDER — LISINOPRIL 20 MG/1
20 TABLET ORAL DAILY
Qty: 30 TAB | Refills: 1 | Status: SHIPPED | OUTPATIENT
Start: 2018-12-07 | End: 2019-05-14 | Stop reason: SDUPTHER

## 2018-12-07 RX ORDER — LISINOPRIL 10 MG/1
10 TABLET ORAL DAILY
Status: DISCONTINUED | OUTPATIENT
Start: 2018-12-07 | End: 2018-12-07

## 2018-12-07 RX ORDER — ACETAMINOPHEN 325 MG/1
650 TABLET ORAL
Status: DISCONTINUED | OUTPATIENT
Start: 2018-12-07 | End: 2018-12-08 | Stop reason: HOSPADM

## 2018-12-07 RX ORDER — SODIUM CHLORIDE 0.9 % (FLUSH) 0.9 %
5-10 SYRINGE (ML) INJECTION EVERY 8 HOURS
Status: DISCONTINUED | OUTPATIENT
Start: 2018-12-07 | End: 2018-12-08 | Stop reason: HOSPADM

## 2018-12-07 RX ORDER — SODIUM CHLORIDE 0.9 % (FLUSH) 0.9 %
5-10 SYRINGE (ML) INJECTION AS NEEDED
Status: DISCONTINUED | OUTPATIENT
Start: 2018-12-07 | End: 2018-12-08 | Stop reason: HOSPADM

## 2018-12-07 RX ORDER — MIDAZOLAM HYDROCHLORIDE 1 MG/ML
INJECTION, SOLUTION INTRAMUSCULAR; INTRAVENOUS AS NEEDED
Status: DISCONTINUED | OUTPATIENT
Start: 2018-12-07 | End: 2018-12-07 | Stop reason: HOSPADM

## 2018-12-07 RX ORDER — FENTANYL CITRATE 50 UG/ML
INJECTION, SOLUTION INTRAMUSCULAR; INTRAVENOUS AS NEEDED
Status: DISCONTINUED | OUTPATIENT
Start: 2018-12-07 | End: 2018-12-07 | Stop reason: HOSPADM

## 2018-12-07 RX ORDER — DEXTROSE MONOHYDRATE AND SODIUM CHLORIDE 5; .9 G/100ML; G/100ML
100 INJECTION, SOLUTION INTRAVENOUS CONTINUOUS
Status: DISCONTINUED | OUTPATIENT
Start: 2018-12-07 | End: 2018-12-07

## 2018-12-07 RX ORDER — ASPIRIN 325 MG
325 TABLET ORAL DAILY
Status: DISCONTINUED | OUTPATIENT
Start: 2018-12-07 | End: 2018-12-08 | Stop reason: HOSPADM

## 2018-12-07 RX ORDER — METOPROLOL TARTRATE 25 MG/1
25 TABLET, FILM COATED ORAL EVERY 12 HOURS
Status: DISCONTINUED | OUTPATIENT
Start: 2018-12-07 | End: 2018-12-08 | Stop reason: HOSPADM

## 2018-12-07 RX ORDER — LISINOPRIL 20 MG/1
20 TABLET ORAL DAILY
Status: DISCONTINUED | OUTPATIENT
Start: 2018-12-08 | End: 2018-12-08 | Stop reason: HOSPADM

## 2018-12-07 RX ORDER — LIDOCAINE HYDROCHLORIDE 10 MG/ML
INJECTION, SOLUTION EPIDURAL; INFILTRATION; INTRACAUDAL; PERINEURAL AS NEEDED
Status: DISCONTINUED | OUTPATIENT
Start: 2018-12-07 | End: 2018-12-07 | Stop reason: HOSPADM

## 2018-12-07 RX ADMIN — TRAZODONE HYDROCHLORIDE 50 MG: 50 TABLET ORAL at 21:20

## 2018-12-07 RX ADMIN — SODIUM CHLORIDE 75 ML/HR: 900 INJECTION, SOLUTION INTRAVENOUS at 10:47

## 2018-12-07 RX ADMIN — ASPIRIN 325 MG ORAL TABLET 325 MG: 325 PILL ORAL at 10:42

## 2018-12-07 RX ADMIN — SODIUM CHLORIDE 500 ML: 900 INJECTION, SOLUTION INTRAVENOUS at 02:09

## 2018-12-07 RX ADMIN — METOPROLOL TARTRATE 25 MG: 25 TABLET ORAL at 21:19

## 2018-12-07 RX ADMIN — Medication 10 ML: at 18:28

## 2018-12-07 RX ADMIN — LISINOPRIL 10 MG: 10 TABLET ORAL at 18:27

## 2018-12-07 RX ADMIN — ATORVASTATIN CALCIUM 40 MG: 40 TABLET, FILM COATED ORAL at 10:42

## 2018-12-07 RX ADMIN — MULTIPLE VITAMINS W/ MINERALS TAB 1 TABLET: TAB at 10:43

## 2018-12-07 RX ADMIN — NITROGLYCERIN 0.5 INCH: 20 OINTMENT TOPICAL at 02:09

## 2018-12-07 RX ADMIN — ACETAMINOPHEN 650 MG: 325 TABLET ORAL at 18:27

## 2018-12-07 RX ADMIN — ENOXAPARIN SODIUM 70 MG: 80 INJECTION SUBCUTANEOUS at 03:06

## 2018-12-07 RX ADMIN — Medication 10 ML: at 22:26

## 2018-12-07 RX ADMIN — METOPROLOL TARTRATE 25 MG: 25 TABLET ORAL at 10:43

## 2018-12-07 RX ADMIN — DEXTROSE MONOHYDRATE AND SODIUM CHLORIDE 100 ML/HR: 5; .9 INJECTION, SOLUTION INTRAVENOUS at 03:10

## 2018-12-07 RX ADMIN — LISINOPRIL 10 MG: 10 TABLET ORAL at 10:43

## 2018-12-07 NOTE — ED NOTES
Pt resting quietly on stretcher, pt denies any pain at this time. Pt awake and  alert, color pink resp relaxed. nsr on monitor noted. 2l nc placed on pt

## 2018-12-07 NOTE — ED PROVIDER NOTES
C/o chest pain x 2 days. Esisodic, lasting about 10 min per episode. Moderate. But severe since 2330 tonight, constant. Called 911, taken to [facility], given asa 325mg and ntg en route. Says left bc sent to waiting room on arrival.  Pain better now, curr only 2-3/10. H/o htn, non compliant w medications for last 2 days. H/o neg cardiac stress test, about 5 yrs ago per pt. Non smoker. Past Medical History:  
Diagnosis Date  Anemia  Hypertension Past Surgical History:  
Procedure Laterality Date  HX APPENDECTOMY Family History:  
Problem Relation Age of Onset  Breast Cancer Mother  Hypertension Mother  Diabetes Mother  Heart Failure Mother  Breast Cancer Sister  Breast Cancer Sister Social History Socioeconomic History  Marital status:  Spouse name: Not on file  Number of children: Not on file  Years of education: Not on file  Highest education level: Not on file Social Needs  Financial resource strain: Not on file  Food insecurity - worry: Not on file  Food insecurity - inability: Not on file  Transportation needs - medical: Not on file  Transportation needs - non-medical: Not on file Occupational History  Not on file Tobacco Use  Smoking status: Never Smoker  Smokeless tobacco: Never Used Substance and Sexual Activity  Alcohol use: Yes Alcohol/week: 0.0 oz  Drug use: No  
 Sexual activity: Yes  
  Partners: Male Birth control/protection: Surgical  
  Comment: tubal ligation Other Topics Concern  Not on file Social History Narrative  Not on file ALLERGIES: Norvasc [amlodipine] Review of Systems Constitutional: Negative for fever. HENT: Negative for congestion. Respiratory: Negative for cough and shortness of breath. Cardiovascular: Positive for chest pain. Gastrointestinal: Negative for abdominal pain, blood in stool and vomiting. Musculoskeletal: Negative for back pain. Skin: Negative for rash. Neurological: Negative for light-headedness. All other systems reviewed and are negative. Vitals:  
 12/07/18 1361 12/07/18 0246 12/07/18 0247 12/07/18 3119 BP: 166/77 Pulse: 66 (!) 59 (!) 56 61 Resp: 20 17 15 17 Temp:      
SpO2: 99% 97% 99% 100% Weight:      
Height:      
      
 
Physical Exam  
Constitutional: She is oriented to person, place, and time. She appears well-developed. HENT:  
Head: Normocephalic and atraumatic. Eyes: Pupils are equal, round, and reactive to light. Neck: Normal range of motion. Cardiovascular: Normal rate and regular rhythm. No murmur heard. Pulmonary/Chest: Effort normal. She has no wheezes. Abdominal: Soft. There is no tenderness. Musculoskeletal: She exhibits no tenderness. Neurological: She is alert and oriented to person, place, and time. Skin: No rash noted. She is not diaphoretic. MDM Procedures Vitals: 
Patient Vitals for the past 12 hrs: 
 Temp Pulse Resp BP SpO2  
12/07/18 0248  61 17  100 % 12/07/18 0247  (!) 56 15  99 % 12/07/18 0246  (!) 59 17  97 % 12/07/18 0245  66 20 166/77 99 % 12/07/18 0244  66 19  97 % 12/07/18 0243  68 19  97 % 12/07/18 0242  66 19  98 % 12/07/18 0241  66 19  98 % 12/07/18 0240  66 17  98 % 12/07/18 0239  70 22  97 % 12/07/18 0238  78 26  99 % 12/07/18 0237  72 19  98 % 12/07/18 0236  64 14  98 % 12/07/18 0235  66 18  99 % 12/07/18 0234  68 18  98 % 12/07/18 0233  66 18  97 % 12/07/18 0232  60 19  97 % 12/07/18 0231  65 15  97 % 12/07/18 0230  65 18 166/88 98 % 12/07/18 0229  68 19  99 % 12/07/18 0228  62 19  96 % 12/07/18 0227  63 21  96 % 12/07/18 0226  62 19  96 % 12/07/18 0225  63 18  96 % 12/07/18 0224  61 18  97 % 12/07/18 0223  64 21  96 % 12/07/18 0222  63 20  96 % 12/07/18 0221  62 18  96 % 12/07/18 0220  64 20  97 % 12/07/18 0219  62 20  96 % 12/07/18 0218  61 20  97 % 12/07/18 0217  63 20  97 % 12/07/18 0216  69 18  98 % 12/07/18 0214  (!) 59 18  97 % 12/07/18 0213  60 17  96 % 12/07/18 0212  63 19  96 % 12/07/18 0211  63 15  97 % 12/07/18 0210  63 14  98 % 12/07/18 0209  68 17 150/74 97 % 12/07/18 0140 98.1 °F (36.7 °C) 66 14 151/82 97 % Medications ordered:  
Medications  
enoxaparin (LOVENOX) injection 70 mg (not administered) 0.9% sodium chloride infusion 500 mL (500 mL IntraVENous New Bag 12/7/18 0209)  
nitroglycerin (NITROBID) 2 % ointment 0.5 Inch (0.5 Inches Topical Given 12/7/18 0209) Lab findings: 
Recent Results (from the past 12 hour(s)) EKG, 12 LEAD, INITIAL Collection Time: 12/07/18  1:40 AM  
Result Value Ref Range Ventricular Rate 60 BPM  
 Atrial Rate 60 BPM  
 P-R Interval 176 ms QRS Duration 70 ms Q-T Interval 408 ms QTC Calculation (Bezet) 408 ms Calculated P Axis 64 degrees Calculated R Axis 20 degrees Calculated T Axis 16 degrees Diagnosis Normal sinus rhythm Possible Left atrial enlargement Borderline ECG When compared with ECG of 09-JUN-2014 14:39, 
Questionable change in QRS axis CBC WITH AUTOMATED DIFF Collection Time: 12/07/18  2:00 AM  
Result Value Ref Range WBC 8.8 4.6 - 13.2 K/uL  
 RBC 3.93 (L) 4.20 - 5.30 M/uL  
 HGB 11.3 (L) 12.0 - 16.0 g/dL HCT 34.0 (L) 35.0 - 45.0 % MCV 86.5 74.0 - 97.0 FL  
 MCH 28.8 24.0 - 34.0 PG  
 MCHC 33.2 31.0 - 37.0 g/dL  
 RDW 12.9 11.6 - 14.5 % PLATELET 119 511 - 011 K/uL MPV 10.7 9.2 - 11.8 FL  
 NEUTROPHILS 56 40 - 73 % LYMPHOCYTES 31 21 - 52 % MONOCYTES 8 3 - 10 % EOSINOPHILS 5 0 - 5 % BASOPHILS 0 0 - 2 %  
 ABS. NEUTROPHILS 5.0 1.8 - 8.0 K/UL  
 ABS. LYMPHOCYTES 2.8 0.9 - 3.6 K/UL  
 ABS. MONOCYTES 0.7 0.05 - 1.2 K/UL  
 ABS. EOSINOPHILS 0.4 0.0 - 0.4 K/UL  
 ABS. BASOPHILS 0.0 0.0 - 0.1 K/UL DF AUTOMATED METABOLIC PANEL, BASIC Collection Time: 12/07/18  2:00 AM  
Result Value Ref Range Sodium 141 136 - 145 mmol/L Potassium 3.5 3.5 - 5.5 mmol/L Chloride 105 100 - 108 mmol/L  
 CO2 27 21 - 32 mmol/L Anion gap 9 3.0 - 18 mmol/L Glucose 110 (H) 74 - 99 mg/dL BUN 12 7.0 - 18 MG/DL Creatinine 0.71 0.6 - 1.3 MG/DL  
 BUN/Creatinine ratio 17 12 - 20 GFR est AA >60 >60 ml/min/1.73m2 GFR est non-AA >60 >60 ml/min/1.73m2 Calcium 8.7 8.5 - 10.1 MG/DL  
CARDIAC PANEL,(CK, CKMB & TROPONIN) Collection Time: 12/07/18  2:00 AM  
Result Value Ref Range  26 - 192 U/L  
 CK - MB 2.2 <3.6 ng/ml CK-MB Index 1.3 0.0 - 4.0 % Troponin-I, Qt. 0.66 (H) 0.00 - 0.06 NG/ML  
D DIMER Collection Time: 12/07/18  2:00 AM  
Result Value Ref Range D DIMER 0.30 <0.46 ug/ml(FEU) X-Ray, CT or other radiology findings or impressions: 
XR CHEST PORT    (Results Pending)  
neg; ep interp Progress notes, Consult notes or additional Procedure notes:  
2:52 AM pt currently pain free. Had asa en route to [facility]. Has ntp and oxygen on. No complaints. elev trop, c/w acs. To admit.  lovenox ordered. No recent gi or other bleeding. Diagnosis: 1. [facility] Disposition: admit Follow-up Information None Medication List  
  
ASK your doctor about these medications   
cholecalciferol 1,000 unit Cap Commonly known as:  VITAMIN D3 
  
cyclobenzaprine 5 mg tablet Commonly known as:  FLEXERIL Take 1 Tab by mouth nightly. diphenhydrAMINE 25 mg capsule Commonly known as:  BENADRYL 
  
IRON PO 
  
lisinopril 10 mg tablet Commonly known as:  PRINIVIL, ZESTRIL 
TAKE ONE TABLET BY MOUTH DAILY MULTIVITAMIN WITH IRON PO 
  
naproxen 500 mg tablet Commonly known as:  NAPROSYN 
TAKE ONE TABLET BY MOUTH TWICE A DAY AS NEEDED 
  
piroxicam 20 mg capsule Commonly known as:  FELDENE 
  
traZODone 50 mg tablet Commonly known as:  DESYREL 
TAKE 1 TABLET BY MOUTH NIGHTLY.  GENERIC FOR DESYREL

## 2018-12-07 NOTE — PROGRESS NOTES
conducted an initial consultation and Spiritual Assessment for Brigid Marie, who is a 46 y.o.,female. Patients Primary Language is: [place]. According to the patients EMR Christian Affiliation is: [facility].  
 
The reason the Patient came to the hospital is:  
Patient Active Problem List  
 Diagnosis Date Noted  Acute coronary syndrome ([address] 12/07/2018  Atypical chest pain 12/07/2018  S/P colonoscopy with polypectomy/ repeat in 3 years around 2019 april  04/15/2016  Dense breast/ next year 3D mammogram  07/01/2015  Vitamin D deficiency 05/29/2014  Hypertension 05/29/2014 The  provided the following Interventions: 
Initiated a relationship of care and support. Explored issues of maico, belief, spirituality and Oriental orthodox/ritual needs while hospitalized. Listened empathically. Provided chaplaincy education. Provided information about Spiritual Care Services. Offered prayer and assurance of continued prayers on patient's behalf. Chart reviewed. The following outcomes where achieved: 
Patient shared limited information about both their medical narrative and spiritual journey/beliefs.  confirmed Patient's Christian Affiliation. Patient processed feeling about current hospitalization. Patient expressed gratitude for 's visit. Assessment: 
Patient does not have any Oriental orthodox/cultural needs that will affect patients preferences in health care. There are no spiritual or Oriental orthodox issues which require intervention at this time. Plan: 
Chaplains will continue to follow and will provide pastoral care on an as needed/requested basis.  recommends bedside caregivers page  on duty if patient shows signs of acute spiritual or emotional distress. Chaplain Resident David Fierro [facility]  
[phone]

## 2018-12-07 NOTE — Clinical Note
Right groin and right radial clipped, and draped. Wet prep solution applied at: 319. Wet prep solution dried at: 322. Wet prep elapsed drying time: 3 mins.

## 2018-12-07 NOTE — PROGRESS NOTES
Report called to primary RN. Pt transported to room via bed. Right radial access site wdl no bleeding or hematoma present. Pt stable.

## 2018-12-07 NOTE — Clinical Note
TRANSFER - IN REPORT:  
 
Verbal report received from: Meagan Marie. Report consisted of patient's Situation, Background, Assessment and  
Recommendations(SBAR). Opportunity for questions and clarification was provided. Assessment completed upon patient's arrival to unit and care assumed. Patient transported with a Cardiac Cath Tech / Patient Care Tech.

## 2018-12-07 NOTE — ED TRIAGE NOTES
Pt with chest pain for 2 days. Pt was at [facility] and had ekg done and lab work done, Danelle Clapper and aspiring given and then left due to wait time per pt. Pt placed on monitor , bp , pulse ox,, ekg done

## 2018-12-07 NOTE — ED NOTES
Report to hawa done. Pt aware of admission , family aware of admission and took pt's purse home with them.

## 2018-12-07 NOTE — PROGRESS NOTES
[facility] Patient: Kiko Lopez Age: 46 y.o. : 1965 MR#: [id number] SSN: [id number] Date/Time: 2018 Subjective:  
 
Feels well, wants to go home. No F/C, N/V, CP, SOB. Assessment/Plan: 1. Atypical CP - no recurrence. Trop I @ 0.66 this AM. EKGs reviewed: NSR, see no changes from prior in 2014. Echo here with EF 29-94%, grade 1 diastolic dysfxn, no regional wall motion abnml. Has been on Lovenox tx dose. See below. 2. HTN - uncontrolled. BPs remain elevated. Takes ACEi alone as outpt, started on BBlocker here. Will increase ACEi dose. 3. Spoke with cardiology. Plan to cath. Repeat trop I @ 13.40. As d/w nursing, maintain NPO. Additional Notes:   
ADDENDUM: Case d/w cardiology. Cath note reviewed. No CADz. Will monitor o/n. Aim for d/c tomorrow. Will increase lisinopril to 20mg/day, with a dose of 10mg now to catch up. Liset Bernard MD 
2018 
5:56 PM 
 
Case discussed with:  [x]Patient  []Family  [x]Nursing  []Case Management DVT Prophylaxis:  [x]Lovenox  []Hep SQ  []SCDs  []Coumadin   []On Heparin gtt Objective:  
VS:  
Visit Vitals /81 Pulse 67 Temp 98.3 °F (36.8 °C) Resp 18 Ht 5' 2\" (1.575 m) Wt 73.9 kg (163 lb) SpO2 97% BMI 29.81 kg/m² Tmax/24hrs: Temp (24hrs), Av.3 °F (36.8 °C), Min:98.1 °F (36.7 °C), Max:98.6 °F (37 °C) Input/Output:  
 
Intake/Output Summary (Last 24 hours) at 2018 1220 Last data filed at 2018 1461 Gross per 24 hour Intake 500 ml Output  Net 500 ml General:  Awake, alert, NAD. Cardiovascular:  RRR. Pulmonary:  CTA B. 
GI:  Soft, NT/ND, NABS. Extremities:  No CT or edema. Additional:   
 
Labs:   
Recent Results (from the past 24 hour(s)) EKG, 12 LEAD, INITIAL Collection Time: 18  1:40 AM  
Result Value Ref Range Ventricular Rate 60 BPM  
 Atrial Rate 60 BPM  
 P-R Interval 176 ms QRS Duration 70 ms Q-T Interval 408 ms QTC Calculation (Bezet) 408 ms Calculated P Axis 64 degrees Calculated R Axis 20 degrees Calculated T Axis 16 degrees Diagnosis Normal sinus rhythm Possible Left atrial enlargement Borderline ECG When compared with ECG of 09-JUN-2014 14:39, 
Questionable change in QRS axis CBC WITH AUTOMATED DIFF Collection Time: 12/07/18  2:00 AM  
Result Value Ref Range WBC 8.8 4.6 - 13.2 K/uL  
 RBC 3.93 (L) 4.20 - 5.30 M/uL  
 HGB 11.3 (L) 12.0 - 16.0 g/dL HCT 34.0 (L) 35.0 - 45.0 % MCV 86.5 74.0 - 97.0 FL  
 MCH 28.8 24.0 - 34.0 PG  
 MCHC 33.2 31.0 - 37.0 g/dL  
 RDW 12.9 11.6 - 14.5 % PLATELET 667 822 - 190 K/uL MPV 10.7 9.2 - 11.8 FL  
 NEUTROPHILS 56 40 - 73 % LYMPHOCYTES 31 21 - 52 % MONOCYTES 8 3 - 10 % EOSINOPHILS 5 0 - 5 % BASOPHILS 0 0 - 2 %  
 ABS. NEUTROPHILS 5.0 1.8 - 8.0 K/UL  
 ABS. LYMPHOCYTES 2.8 0.9 - 3.6 K/UL  
 ABS. MONOCYTES 0.7 0.05 - 1.2 K/UL  
 ABS. EOSINOPHILS 0.4 0.0 - 0.4 K/UL  
 ABS. BASOPHILS 0.0 0.0 - 0.1 K/UL  
 DF AUTOMATED METABOLIC PANEL, BASIC Collection Time: 12/07/18  2:00 AM  
Result Value Ref Range Sodium 141 136 - 145 mmol/L Potassium 3.5 3.5 - 5.5 mmol/L Chloride 105 100 - 108 mmol/L  
 CO2 27 21 - 32 mmol/L Anion gap 9 3.0 - 18 mmol/L Glucose 110 (H) 74 - 99 mg/dL BUN 12 7.0 - 18 MG/DL Creatinine 0.71 0.6 - 1.3 MG/DL  
 BUN/Creatinine ratio 17 12 - 20 GFR est AA >60 >60 ml/min/1.73m2 GFR est non-AA >60 >60 ml/min/1.73m2 Calcium 8.7 8.5 - 10.1 MG/DL  
CARDIAC PANEL,(CK, CKMB & TROPONIN) Collection Time: 12/07/18  2:00 AM  
Result Value Ref Range  26 - 192 U/L  
 CK - MB 2.2 <3.6 ng/ml CK-MB Index 1.3 0.0 - 4.0 % Troponin-I, Qt. 0.66 (H) 0.00 - 0.06 NG/ML  
D DIMER Collection Time: 12/07/18  2:00 AM  
Result Value Ref Range D DIMER 0.30 <0.46 ug/ml(FEU) ECHO ADULT COMPLETE Collection Time: 12/07/18  9:26 AM  
Result Value Ref Range LA Volume 57.49 (A) 22 - 52 mL  
 LV E' Lateral Velocity 8.00 cm/s LV E' Septal Velocity 10.00 cm/s Tapse 2.70 (A) 1.5 - 2.0 cm Ao Root D 3.33 cm  
 LA Vol Index 32.81 16 - 28 ml/m2 LVIDd 4.00 3.9 - 5.3 cm  
 LVPWd 1.10 0.6 - 0.9 cm LVIDs 2.20 cm IVSd 1.20 0.6 - 0.9 cm  
 LVOT d 1.9 cm  
 LVOT Peak Gradient 2.0 mmHg LVOT VTI 21.70 cm MV A Wesly 102.00 cm/s  
 MV E Wesly 93.00 cm/s  
 MV E/A 0.91   
 E/E' lateral 11.63   
 E/E' septal 9.30   
 E/E' ratio (averaged) 10.46 Mitral Valve E Wave Deceleration Time 150.0 ms  
 Triscuspid Valve Regurgitation Peak Gradient 35.0 mmHg PASP 43 mmHg Additional Data Reviewed:   
 
Signed By: Prabhu Ramos MD   
 December 7, 2018 12:20 PM

## 2018-12-07 NOTE — ED NOTES
Critical Result Notification Received and verbally repeated the following test results trop 0.66,   from Meagan Adan 178 (NAME OF TECHNICIAN) on 12/7/18 (DATE), at [phone] (TIME). Dr Coleen Ormond (NAME OF PROVIDER) was notified and provided a verbal readback of the results listed above on 12/7/18(DATE) at 0245(TIME). Orders were not received at this time.  
 
 
Jennie Sorensen RN

## 2018-12-07 NOTE — Clinical Note
TRANSFER - OUT REPORT:  
 
Verbal report given to: Mert PALOMO. Report consisted of patient's Situation, Background, Assessment and  
Recommendations(SBAR). Opportunity for questions and clarification was provided. Patient transported with a Cardiac Cath Tech / Patient Care Tech. Patient transported to: [address]

## 2018-12-07 NOTE — ED TRIAGE NOTES
Pt states she has taken her mediciations in 3 days r/t left meds at friends house and didn't want to drive and get them.

## 2018-12-07 NOTE — Clinical Note
Contrast Dose Calculator:  
Patient's age: 46.  
Patient's sex: Female. Patient weight (kg) = 73.9. Creatinine level (mg/dL) = 0.71. Creatinine clearance (mL/min): 106. Contrast concentration (mg/mL) = 300. MACD = 300 mL. Max Contrast dose per Creatinine Cl calculator = 238.5 mL.

## 2018-12-07 NOTE — H&P
HISTORY & PHYSICAL Patient: Marcos Moscoso MRN: [id number]  CSN: [id number] YOB: 1965  Age: 46 y.o. Sex: female DOA: 12/7/2018 LOS:  LOS: 0 days DOA: 12/7/2018 Assessment/Plan Active Problems: 
  Acute coronary syndrome ([address] (12/7/2018) Atypical chest pain (12/7/2018) Plan: 1. Atypical chest pain - ASA, statins , BB, Echo , serial cardiac enzymes, Cardiology consult , Lovenox 1mg/kg sq [id number] 2. HTN - resume lisinopril DVT Px - Lovenox FC Severity of Signs & Symptoms -- Moderate Risk of adverse events -- Moderate Current Medical Rx Plan - As Above Patient history & comorbidities - Per HPI Discharge Plan -- Home HPI:  
 
Marcos Moscoso is a 46 y.o. female who is being admitted for Atypical CP -- she was seen at [place] [address] yesterday for R shoulder pain & discharged - comes back with atypical chest pain but LWBS - went to [facility] ER - noted to have a mild elevation in her trop Atypical chest pain - retrosternal - no radiation , assoc with Mild SOB , ongoing for about 2 days , got worse last night Advised admission for further eval since she hasnt had a cardiac eval in a while Past Medical History:  
Diagnosis Date  Anemia  Hypertension Past Surgical History:  
Procedure Laterality Date  HX APPENDECTOMY Family History Problem Relation Age of Onset  Breast Cancer Mother  Hypertension Mother  Diabetes Mother  Heart Failure Mother  Breast Cancer Sister  Breast Cancer Sister Social History Socioeconomic History  Marital status:  Spouse name: Not on file  Number of children: Not on file  Years of education: Not on file  Highest education level: Not on file Tobacco Use  Smoking status: Never Smoker  Smokeless tobacco: Never Used Substance and Sexual Activity  Alcohol use: Yes Alcohol/week: 0.0 oz  Drug use:  No  
  Sexual activity: Yes  
  Partners: Male Birth control/protection: Surgical  
  Comment: tubal ligation Prior to Admission medications Medication Sig Start Date End Date Taking? Authorizing Provider  
cyclobenzaprine (FLEXERIL) 5 mg tablet Take 1 Tab by mouth nightly. 11/22/17  Yes Hesham Gonzales PA  
naproxen (NAPROSYN) 500 mg tablet TAKE ONE TABLET BY MOUTH TWICE A DAY AS NEEDED 11/8/18   Yair Hernandez MD  
lisinopril (PRINIVIL, ZESTRIL) 10 mg tablet TAKE ONE TABLET BY MOUTH DAILY 9/10/18   Yair Hernandez MD  
traZODone (DESYREL) 50 mg tablet TAKE 1 TABLET BY MOUTH NIGHTLY. GENERIC FOR DESYREL 9/4/18   Yair Hernandez MD  
piroxicam (FELDENE) 20 mg capsule Take 1 Cap by mouth daily. 7/2/18   Provider, Historical  
diphenhydrAMINE (BENADRYL) 25 mg capsule Take 25 mg by mouth as needed. Provider, Historical  
MULTIVITAMIN WITH IRON PO Take 1 Tab by mouth daily. Provider, Historical  
FERROUS FUMARATE (IRON PO) Take 325 mg by mouth two (2) times a day. Provider, Historical  
Cholecalciferol, Vitamin D3, 1,000 unit cap Take 1,000 Units by mouth two (2) times a day. Provider, Historical  
 
 
Allergies Allergen Reactions  Norvasc [Amlodipine] Swelling Ankle swelling Review of Systems A comprehensive review of systems was negative except for that written in the History of Present Illness. Physical Exam:  
  
Visit Vitals /71 Pulse 72 Temp 98.1 °F (36.7 °C) Resp 18 Ht 5' 2\" (1.575 m) Wt 73.9 kg (163 lb) SpO2 98% BMI 29.81 kg/m² Physical Exam: 
 
Gen: In general, this is a well nourished female in no acute distress HEENT: Sclerae nonicteric. Oral mucous membranes moist. Dentition normal 
Neck: Supple with midline trachea. CV: RRR without murmur or rub appreciated. Resp:Respirations are unlabored without use of accessory muscles. Lung fields B/L without wheezes or rhonchi. Abd: Soft, nontender, nondistended. Extrem: Extremities are warm, without cyanosis or clubbing. No pitting pretibial edema. Skin: Warm, no visible rashes. Neuro: Patient is alert, oriented, and cooperative. No obvious focal defects. Moves all 4 extremities. Labs Reviewed: 
 
Recent Results (from the past 24 hour(s)) EKG, 12 LEAD, INITIAL Collection Time: 12/07/18  1:40 AM  
Result Value Ref Range Ventricular Rate 60 BPM  
 Atrial Rate 60 BPM  
 P-R Interval 176 ms QRS Duration 70 ms Q-T Interval 408 ms QTC Calculation (Bezet) 408 ms Calculated P Axis 64 degrees Calculated R Axis 20 degrees Calculated T Axis 16 degrees Diagnosis Normal sinus rhythm Possible Left atrial enlargement Borderline ECG When compared with ECG of 09-JUN-2014 14:39, 
Questionable change in QRS axis CBC WITH AUTOMATED DIFF Collection Time: 12/07/18  2:00 AM  
Result Value Ref Range WBC 8.8 4.6 - 13.2 K/uL  
 RBC 3.93 (L) 4.20 - 5.30 M/uL  
 HGB 11.3 (L) 12.0 - 16.0 g/dL HCT 34.0 (L) 35.0 - 45.0 % MCV 86.5 74.0 - 97.0 FL  
 MCH 28.8 24.0 - 34.0 PG  
 MCHC 33.2 31.0 - 37.0 g/dL  
 RDW 12.9 11.6 - 14.5 % PLATELET 380 535 - 126 K/uL MPV 10.7 9.2 - 11.8 FL  
 NEUTROPHILS 56 40 - 73 % LYMPHOCYTES 31 21 - 52 % MONOCYTES 8 3 - 10 % EOSINOPHILS 5 0 - 5 % BASOPHILS 0 0 - 2 %  
 ABS. NEUTROPHILS 5.0 1.8 - 8.0 K/UL  
 ABS. LYMPHOCYTES 2.8 0.9 - 3.6 K/UL  
 ABS. MONOCYTES 0.7 0.05 - 1.2 K/UL  
 ABS. EOSINOPHILS 0.4 0.0 - 0.4 K/UL  
 ABS. BASOPHILS 0.0 0.0 - 0.1 K/UL  
 DF AUTOMATED METABOLIC PANEL, BASIC Collection Time: 12/07/18  2:00 AM  
Result Value Ref Range Sodium 141 136 - 145 mmol/L Potassium 3.5 3.5 - 5.5 mmol/L Chloride 105 100 - 108 mmol/L  
 CO2 27 21 - 32 mmol/L Anion gap 9 3.0 - 18 mmol/L Glucose 110 (H) 74 - 99 mg/dL BUN 12 7.0 - 18 MG/DL Creatinine 0.71 0.6 - 1.3 MG/DL  
 BUN/Creatinine ratio 17 12 - 20 GFR est AA >60 >60 ml/min/1.73m2 GFR est non-AA >60 >60 ml/min/1.73m2 Calcium 8.7 8.5 - 10.1 MG/DL  
CARDIAC PANEL,(CK, CKMB & TROPONIN) Collection Time: 12/07/18  2:00 AM  
Result Value Ref Range  26 - 192 U/L  
 CK - MB 2.2 <3.6 ng/ml CK-MB Index 1.3 0.0 - 4.0 % Troponin-I, Qt. 0.66 (H) 0.00 - 0.06 NG/ML  
D DIMER Collection Time: 12/07/18  2:00 AM  
Result Value Ref Range D DIMER 0.30 <0.46 ug/ml(FEU) Imaging Reviewed: CXR - negative Ann-Marie Otero MD 
12/7/2018, 5:49 AM

## 2018-12-07 NOTE — ED NOTES
Report called to Hector Redd RN on [address] and transport arranged with [facility]. Pt updated on plan of care, EMTALA signed.

## 2018-12-07 NOTE — ROUTINE PROCESS
Bedside and Verbal shift change report given to Jones Estrada (oncoming nurse) by Renuka Palomino RN (offgoing nurse). Report included the following information SBAR, Kardex, Procedure Summary, Intake/Output, MAR and Recent Results.

## 2018-12-07 NOTE — PROGRESS NOTES
Reason for Admission:   Acute coronary syndrome, atypical chest pain RRAT Score:   2 Plan for utilizing home health:  no     
                 
Likelihood of Readmission:  Green/low Transition of Care Plan:    Pt is AOx4 and reported she was independent and was ambulating without assistance. She states her daughter lives with her and she has no DME at home. She verified her pcp and demographic info. She states she does not have any concerns for discharge at this time. Her daughter daughter will be providing transport to take her home when discharged. Care Management Interventions PCP Verified by CM: Yes(As per pt, she saw pcp Dr. Femi Loera in September) Palliative Care Criteria Met (RRAT>21 & CHF Dx)?: No 
Mode of Transport at Discharge: Other (see comment)(daughter ill pick her up) Transition of Care Consult (CM Consult): Discharge Planning Physical Therapy Consult: No 
Occupational Therapy Consult: No 
Speech Therapy Consult: No 
Current Support Network: Other(daughter lives with her) Confirm Follow Up Transport: Self Plan discussed with Pt/Family/Caregiver:  Yes

## 2018-12-07 NOTE — CONSULTS
[facility] - Consult Note    Consultation request by Ann-Marie Otero MD for advice/opinion related to evaluating Acute coronary syndrome [facility]); Atypical chest pain     Date of  Admission: 12/7/2018  1:38 AM   Primary Care Physician:  Arsen Carbajal MD     Assessment:     -NSTEMI with troponin to 13, became severe last night, improved today  Initially thought to be due to shoulder injury. EKG without acute changes.   -Echo 12/7/18 with normal EF, no wall motion abnormalities. -Recent right shoulder pain seen at [facility] initially  -h/o HTN  -h/o colon polyps 3 years ago    No primary cardiologist     Plan:     Given lovenox. SBP at 180's, start BB, ACEI. ASA, statin. Echo this morning and repeat troponin. Pending results with pursue cath vs stress test.   Keep NPO for now. Addendum:  Troponin increased to 13, discussed risks, benefits, alternatives to cath, possible PCI and patient willing to proceed. All questions answered. No bleeding history. No planned surgeries. No shellfish/iodine allergy. Notified cath team and Dr. Dirk Rossi who will perform today. History of Present Illness: This is a 46 y.o. female admitted for Acute coronary syndrome ([address] Atypical chest pain. Patient complains of:    Chest pain for 2 days, worse last night. Patient initially had trauma to right shoulder and seen at [facility] on 12/6, given toradol, improved. Then developed worsening chest pain and seen in ER with troponin 0.6. H/o HTN but noncompliant with meds.       Cardiac risk factors: HTN, last stress test 5 years ago ok    Review of Symptoms:  Except as stated above include:  Constitutional:  Negative  Ears, nose, throat:  Negative  Respiratory:  negative  Cardiovascular:  Chest pain  Gastrointestinal: negative  Genitourinary:  negative  Musculoskeletal:  Right shoulder pain  Neurological:  Negative  Dermatological:  Negative  Hematological: Negative  Endocrinological: Negative  Allergy: Negative  Psychological:  Negative       Past Medical History:     Past Medical History:   Diagnosis Date    Anemia     Hypertension          Social History:     Social History     Socioeconomic History    Marital status:      Spouse name: Not on file    Number of children: Not on file    Years of education: Not on file    Highest education level: Not on file   Tobacco Use    Smoking status: Never Smoker    Smokeless tobacco: Never Used   Substance and Sexual Activity    Alcohol use: Yes     Alcohol/week: 0.0 oz    Drug use: No    Sexual activity: Yes     Partners: Male     Birth control/protection: Surgical     Comment: tubal ligation        Family History:     Family History   Problem Relation Age of Onset    Breast Cancer Mother     Hypertension Mother     Diabetes Mother     Heart Failure Mother     Breast Cancer Sister     Breast Cancer Sister         Medications:      Allergies   Allergen Reactions    Norvasc [Amlodipine] Swelling     Ankle swelling         Current Facility-Administered Medications   Medication Dose Route Frequency    lisinopril (PRINIVIL, ZESTRIL) tablet 10 mg  10 mg Oral DAILY    multivitamin, tx-iron-ca-min (THERA-M w/ IRON) tablet 1 Tab  1 Tab Oral DAILY WITH BREAKFAST    traZODone (DESYREL) tablet 50 mg  50 mg Oral QHS    acetaminophen (TYLENOL) tablet 650 mg  650 mg Oral Q6H PRN    ondansetron (ZOFRAN) injection 4 mg  4 mg IntraVENous Q6H PRN    0.9% sodium chloride infusion  75 mL/hr IntraVENous CONTINUOUS    aspirin tablet 325 mg  325 mg Oral DAILY    atorvastatin (LIPITOR) tablet 40 mg  40 mg Oral DAILY    metoprolol tartrate (LOPRESSOR) tablet 25 mg  25 mg Oral Q12H    enoxaparin (LOVENOX) injection 70 mg  70 mg SubCUTAneous Q12H    influenza vaccine 2018-19 (6 mos+)(PF) (FLUARIX QUAD/FLULAVAL QUAD) injection 0.5 mL  0.5 mL IntraMUSCular ONCE         Physical Exam:     Visit Vitals  /81 (BP 1 Location: Left arm, BP Patient Position: At rest) Pulse 67   Temp 98.3 °F (36.8 °C)   Resp 18   Ht 5' 2\" (1.575 m)   Wt 73.9 kg (163 lb)   SpO2 97%   BMI 29.81 kg/m²     BP Readings from Last 3 Encounters:   12/07/18 182/81   07/10/18 130/88   02/16/18 138/88     Pulse Readings from Last 3 Encounters:   12/07/18 67   07/10/18 74   02/16/18 80     Wt Readings from Last 3 Encounters:   12/07/18 73.9 kg (163 lb)   07/10/18 74.3 kg (163 lb 12.8 oz)   02/16/18 74.5 kg (164 lb 3.2 oz)       General:  alert, cooperative, no distress, appears stated age  Neck:  nontender  Lungs:  clear to auscultation bilaterally  Heart:  regular rate and rhythm, S1, S2 normal, no murmur, click, rub or gallop  Abdomen:  abdomen is soft without significant tenderness, masses, organomegaly or guarding  Extremities:  extremities normal, atraumatic, no cyanosis or edema  Skin: Warm and dry.  no hyperpigmentation, vitiligo, or suspicious lesions  Neuro: alert, oriented x3, affect appropriate, no focal neurological deficits, moves all extremities well, no involuntary movements, reflexes at knee and ankle intact  Psych: non focal     Data Review:     Recent Labs     12/07/18  0200   WBC 8.8   HGB 11.3*   HCT 34.0*        Recent Labs     12/07/18  0200      K 3.5      CO2 27   *   BUN 12   CREA 0.71   CA 8.7       Results for orders placed or performed during the hospital encounter of 12/07/18   EKG, 12 LEAD, INITIAL   Result Value Ref Range    Ventricular Rate 60 BPM    Atrial Rate 60 BPM    P-R Interval 176 ms    QRS Duration 70 ms    Q-T Interval 408 ms    QTC Calculation (Bezet) 408 ms    Calculated P Axis 64 degrees    Calculated R Axis 20 degrees    Calculated T Axis 16 degrees    Diagnosis       Normal sinus rhythm  Possible Left atrial enlargement  Borderline ECG  When compared with ECG of 09-JUN-2014 14:39,  Questionable change in QRS axis         All Cardiac Markers in the last 24 hours:    Lab Results   Component Value Date/Time     12/07/2018 02:00 AM CKMB 2.2 12/07/2018 02:00 AM    CKND1 1.3 12/07/2018 02:00 AM    TROIQ 0.66 (H) 12/07/2018 02:00 AM       Last Lipid:    Lab Results   Component Value Date/Time    Cholesterol, total 198 05/24/2017 09:25 AM    HDL Cholesterol 59 05/24/2017 09:25 AM    LDL, calculated 123.8 (H) 05/24/2017 09:25 AM    Triglyceride 76 05/24/2017 09:25 AM    CHOL/HDL Ratio 3.4 05/24/2017 09:25 AM       Signed By: Kristy Amos MD     December 7, 2018

## 2018-12-07 NOTE — PROGRESS NOTES
TRANSFER - OUT REPORT: 
 
Verbal report given to Caitlin on Wanlonnie Hopper  being transferred to Cath lab or ordered procedure Report consisted of patients Situation, Background, Assessment and  
Recommendations(SBAR). Information from the following report(s) SBAR and MAR was reviewed with the receiving nurse. Lines:  
Peripheral IV 12/07/18 Right Antecubital (Active) Site Assessment Clean, dry, & intact 12/7/2018  2:03 AM  
Phlebitis Assessment 0 12/7/2018  2:03 AM  
Infiltration Assessment 0 12/7/2018  2:03 AM  
Dressing Status Clean, dry, & intact 12/7/2018  2:03 AM  
Dressing Type Transparent 12/7/2018  2:03 AM  
   
Peripheral IV 12/07/18 Left Antecubital (Active) Opportunity for questions and clarification was provided. Patient transported with: 
 Registered Nurse Tech

## 2018-12-08 VITALS
BODY MASS INDEX: 29.7 KG/M2 | OXYGEN SATURATION: 98 % | HEIGHT: 62 IN | WEIGHT: 161.4 LBS | TEMPERATURE: 97.8 F | SYSTOLIC BLOOD PRESSURE: 170 MMHG | RESPIRATION RATE: 18 BRPM | HEART RATE: 69 BPM | DIASTOLIC BLOOD PRESSURE: 82 MMHG

## 2018-12-08 LAB
ALBUMIN SERPL-MCNC: 3.3 G/DL (ref 3.4–5)
ALBUMIN/GLOB SERPL: 1.1 {RATIO} (ref 0.8–1.7)
ALP SERPL-CCNC: 79 U/L (ref 45–117)
ALT SERPL-CCNC: 21 U/L (ref 13–56)
ANION GAP SERPL CALC-SCNC: 6 MMOL/L (ref 3–18)
APTT PPP: 33.1 SEC (ref 23–36.4)
AST SERPL-CCNC: 15 U/L (ref 15–37)
BILIRUB SERPL-MCNC: 0.2 MG/DL (ref 0.2–1)
BUN SERPL-MCNC: 10 MG/DL (ref 7–18)
BUN/CREAT SERPL: 18 (ref 12–20)
CALCIUM SERPL-MCNC: 8.5 MG/DL (ref 8.5–10.1)
CHLORIDE SERPL-SCNC: 110 MMOL/L (ref 100–108)
CHOLEST SERPL-MCNC: 155 MG/DL
CO2 SERPL-SCNC: 24 MMOL/L (ref 21–32)
CREAT SERPL-MCNC: 0.56 MG/DL (ref 0.6–1.3)
ERYTHROCYTE [DISTWIDTH] IN BLOOD BY AUTOMATED COUNT: 13.1 % (ref 11.6–14.5)
GLOBULIN SER CALC-MCNC: 3.1 G/DL (ref 2–4)
GLUCOSE SERPL-MCNC: 83 MG/DL (ref 74–99)
HCT VFR BLD AUTO: 31.9 % (ref 35–45)
HDLC SERPL-MCNC: 42 MG/DL (ref 40–60)
HDLC SERPL: 3.7 {RATIO} (ref 0–5)
HGB BLD-MCNC: 10.5 G/DL (ref 12–16)
LDLC SERPL CALC-MCNC: 98.6 MG/DL (ref 0–100)
LIPID PROFILE,FLP: NORMAL
MCH RBC QN AUTO: 28.1 PG (ref 24–34)
MCHC RBC AUTO-ENTMCNC: 32.9 G/DL (ref 31–37)
MCV RBC AUTO: 85.3 FL (ref 74–97)
PLATELET # BLD AUTO: 312 K/UL (ref 135–420)
PMV BLD AUTO: 11.2 FL (ref 9.2–11.8)
POTASSIUM SERPL-SCNC: 3.5 MMOL/L (ref 3.5–5.5)
PROT SERPL-MCNC: 6.4 G/DL (ref 6.4–8.2)
RBC # BLD AUTO: 3.74 M/UL (ref 4.2–5.3)
SODIUM SERPL-SCNC: 140 MMOL/L (ref 136–145)
TRIGL SERPL-MCNC: 72 MG/DL (ref ?–150)
TROPONIN I SERPL-MCNC: 6.96 NG/ML (ref 0–0.04)
VLDLC SERPL CALC-MCNC: 14.4 MG/DL
WBC # BLD AUTO: 8.3 K/UL (ref 4.6–13.2)

## 2018-12-08 PROCEDURE — 36415 COLL VENOUS BLD VENIPUNCTURE: CPT

## 2018-12-08 PROCEDURE — 80053 COMPREHEN METABOLIC PANEL: CPT

## 2018-12-08 PROCEDURE — 74011250637 HC RX REV CODE- 250/637: Performed by: INTERNAL MEDICINE

## 2018-12-08 PROCEDURE — 84484 ASSAY OF TROPONIN QUANT: CPT

## 2018-12-08 PROCEDURE — 80061 LIPID PANEL: CPT

## 2018-12-08 PROCEDURE — 85730 THROMBOPLASTIN TIME PARTIAL: CPT

## 2018-12-08 PROCEDURE — 74011250637 HC RX REV CODE- 250/637: Performed by: FAMILY MEDICINE

## 2018-12-08 PROCEDURE — 74011250636 HC RX REV CODE- 250/636: Performed by: HOSPITALIST

## 2018-12-08 PROCEDURE — 85027 COMPLETE CBC AUTOMATED: CPT

## 2018-12-08 PROCEDURE — 74011250637 HC RX REV CODE- 250/637: Performed by: HOSPITALIST

## 2018-12-08 RX ORDER — ASPIRIN 325 MG
325 TABLET ORAL DAILY
Qty: 30 TAB | Refills: 1 | Status: SHIPPED | OUTPATIENT
Start: 2018-12-09 | End: 2018-12-14

## 2018-12-08 RX ORDER — LISINOPRIL 20 MG/1
20 TABLET ORAL
Status: COMPLETED | OUTPATIENT
Start: 2018-12-08 | End: 2018-12-08

## 2018-12-08 RX ORDER — CLOPIDOGREL BISULFATE 75 MG/1
75 TABLET ORAL DAILY
Qty: 30 TAB | Refills: 0 | Status: SHIPPED | OUTPATIENT
Start: 2018-12-09 | End: 2019-01-14 | Stop reason: SDUPTHER

## 2018-12-08 RX ADMIN — SODIUM CHLORIDE 75 ML/HR: 900 INJECTION, SOLUTION INTRAVENOUS at 02:24

## 2018-12-08 RX ADMIN — MULTIPLE VITAMINS W/ MINERALS TAB 1 TABLET: TAB at 10:13

## 2018-12-08 RX ADMIN — ASPIRIN 325 MG ORAL TABLET 325 MG: 325 PILL ORAL at 10:12

## 2018-12-08 RX ADMIN — ATORVASTATIN CALCIUM 40 MG: 40 TABLET, FILM COATED ORAL at 10:12

## 2018-12-08 RX ADMIN — METOPROLOL TARTRATE 25 MG: 25 TABLET ORAL at 10:12

## 2018-12-08 RX ADMIN — CLOPIDOGREL BISULFATE 75 MG: 75 TABLET, FILM COATED ORAL at 10:13

## 2018-12-08 RX ADMIN — LISINOPRIL 20 MG: 20 TABLET ORAL at 10:12

## 2018-12-08 RX ADMIN — LISINOPRIL 20 MG: 20 TABLET ORAL at 04:29

## 2018-12-08 RX ADMIN — Medication 10 ML: at 05:31

## 2018-12-08 NOTE — ROUTINE PROCESS
Received patient in bed, awake, alert and oriented. IV fluid infusing. No complaints made, will continue to monitor. Report  Given by Sarkis Hong RN. 
 
4:03 AM  
Elevated BP, MD informed no new order given. 4:12 AM 
DR. Geo Vera called and ordered to give lisinopril 20 mg as a now order. 4:31 AM  
Lisinopril 20 mg given. 5:49 AM 
[facility] -177/99

## 2018-12-08 NOTE — ROUTINE PROCESS
Bedside and Verbal shift change report given to Bridger Blue (oncoming nurse) by Long Trevino (offgoing nurse). Report included the following information SBAR, Kardex, MAR and Recent Results. SITUATION:  
? Code Status: Full Code 
? Reason for Admission: Acute coronary syndrome ([address] ? Atypical chest pain ? Hospital day: 1 ? Problem List:  
   
Hospital Problems  Date Reviewed: 2/16/2018 Codes Class Noted POA Acute coronary syndrome (HCC) ICD-10-CM: I24.9 ICD-9-CM: 411.1  12/7/2018 Unknown Atypical chest pain ICD-10-CM: R07.89 ICD-9-CM: 786.59  12/7/2018 Unknown BACKGROUND:  
 Past Medical History:  
Past Medical History:  
Diagnosis Date  Anemia  Hypertension Patient taking anticoagulants yes ASSESSMENT:  
? Changes in Assessment Throughout Shift: none ? Patient has Central Line: no Reasons if yes: na 
? Patient has Castro Cath: no Reasons if yes: na  
 
? Last Vitals: 
  
Vitals:  
 12/07/18 1646 12/07/18 1701 12/07/18 1745 12/07/18 1928 BP: 169/75 167/75 (!) 172/92 161/88 Pulse: 66 62 61 (!) 58 Resp: 20 22 20 20 Temp:   98.2 °F (36.8 °C) 97.4 °F (36.3 °C) SpO2: 100% 100% 100% 100% Weight:      
Height:      
 
 
? IV and DRAINS (will only show if present) Peripheral IV 12/07/18 Left Antecubital-Site Assessment: Clean, dry, & intact Peripheral IV 12/07/18 Right Antecubital-Site Assessment: Clean, dry, & intact ? WOUND (if present) Wound Type:  none Dressing present Dressing Present : No 
 Wound Concerns/Notes:  none ? PAIN Pain Assessment Pain Intensity 1: 0 (12/08/18 0048) Pain Location 1: Chest 
    
  Patient Stated Pain Goal: 0 
o Interventions for Pain:  none 
o Intervention effective: no 
o Time of last intervention: na  
o Reassessment Completed: yes ? Last 3 Weights: 
Last 3 Recorded Weights in this Encounter 12/07/18 0140 12/07/18 1705 Weight: 73.9 kg (163 lb) 73.9 kg (163 lb) Weight change: 0 kg (0 lb) ? INTAKE/OUPUT Current Shift: No intake/output data recorded. Last three shifts: 12/06 0701 - 12/07 1900 In: 500 [I.V.:500] Out: 600 [Urine:600] ? LAB RESULTS Recent Labs 12/07/18 
0200 WBC 8.8 HGB 11.3* HCT 34.0*  
 Recent Labs 12/07/18 
0200   
K 3.5 * BUN 12  
CREA 0.71 CA 8.7 RECOMMENDATIONS AND DISCHARGE PLANNING 1. Pending tests/procedures/ Plan of Care or Other Needs: continue to monitor. For possible discharge 2. Discharge plan for patient and Needs/Barriers: home 3. Estimated Discharge Date: 12/08/18 Posted on Whiteboard in [facility] Room: yes 4. The patient's care plan was reviewed with the oncoming nurse. \"HEALS\" SAFETY CHECK Fall Risk Total Score: 0 Safety Measures: Safety Measures: Bed/Chair-Wheels locked, Bed in low position, Call light within reach, Fall prevention (comment), Gripper socks, Side rails X 3 A safety check occurred in the patient's room between off going nurse and oncoming nurse listed above. The safety check included the below items Area Items H High Alert Medications ? Verify all high alert medication drips (heparin, PCA, etc.) E Equipment ? Suction is set up for ALL patients (with yanker) ? Red plugs utilized for all equipment (IV pumps, etc.) ? WOWs wiped down at end of shift. ? Room stocked with oxygen, suction, and other unit-specific supplies A Alarms ? Bed alarm is set for fall risk patients ? Ensure chair alarm is in place and activated if patient is up in a chair L Lines ? Check IV for any infiltration ? Castro bag is empty if patient has a Castro ? Tubing and IV bags are labeled Almer Prim Safety ? Room is clean, patient is clean, and equipment is clean. ? Hallways are clear from equipment besides carts. ? Fall bracelet on for fall risk patients ? Ensure room is clear and free of clutter ? Suction is set up for ALL patients (with candice) ? Hallways are clear from equipment besides carts. ? Isolation precautions followed, supplies available outside room, sign posted Angelina Hardy

## 2018-12-08 NOTE — PROGRESS NOTES
[facility]ovascular [facility]ists - Progress Note Admit Date: 12/7/2018 Assessment:  
 
-NSTEMI with peak troponin to 13. EKG without acute changes. Possible transient thrombus vs vasospasm. 
-s/p heart cath 12/718, no significant obstructive disease, medical therapy, no stent 
-Echo 12/7/18 with normal EF, no wall motion abnormalities. -Recent right shoulder pain seen at [facility] initially 
-h/o HTN 
-h/o colon polyps 3 years ago 
  
No primary cardiologist 
 
Plan:  
 
Patient can discharge with medical therapy for NSTEMI, no PCI done or critical disease. Plan Plavix x 1 month. Needs ASA, statin, BB, ACEI and can titrate as outpatient. Discussed with Dr. Tea Nelson. Subjective: No new complaints. Objective:  
  
Patient Vitals for the past 8 hrs: 
 Temp Pulse Resp BP SpO2  
12/08/18 0726 98.2 °F (36.8 °C) 66 20 (!) 178/99 98 % 12/08/18 0549    (!) 170/99   
12/08/18 0427    (!) 177/99   
12/08/18 0352 98.3 °F (36.8 °C) 63 20 (!) 166/96 96 % 12/08/18 0048 98 °F (36.7 °C) 67 20   Patient Vitals for the past 96 hrs: 
 Weight 12/08/18 0427 73.2 kg (161 lb 6.4 oz) 12/07/18 0926 73.9 kg (163 lb) 12/07/18 0140 73.9 kg (163 lb) Intake/Output Summary (Last 24 hours) at 12/8/2018 2280 Last data filed at 12/8/2018 3288 Gross per 24 hour Intake 1100 ml Output 600 ml Net 500 ml Physical Exam: 
General:  alert, cooperative, no distress, appears stated age Neck:  nontender Lungs:  clear to auscultation bilaterally Heart:  regular rate and rhythm, S1, S2 normal, no murmur, click, rub or gallop Abdomen:  abdomen is soft without significant tenderness, masses, organomegaly or guarding Extremities:  extremities normal, atraumatic, no cyanosis or edema, wrist ok Data Review:  
 
Labs: Results:  
   
Chemistry Recent Labs 12/08/18 
0322 12/07/18 
0200 GLU 83 110*  141  
K 3.5 3.5 * 105 CO2 24 27 BUN 10 12 CREA 0.56* 0.71  
 CA 8.5 8.7 AGAP 6 9 BUCR 18 17 AP 79  --   
TP 6.4  --   
ALB 3.3*  --   
GLOB 3.1  --   
AGRAT 1.1  --   
  
CBC w/Diff Recent Labs 12/08/18 
0322 12/07/18 
0200 WBC 8.3 8.8  
RBC 3.74* 3.93* HGB 10.5* 11.3* HCT 31.9* 34.0*  
 306 GRANS  --  56  
LYMPH  --  31  
EOS  --  5 Cardiac Enzymes Lab Results Component Value Date/Time  (H) 12/07/2018 11:35 AM  
 CKMB 10.3 (H) 12/07/2018 11:35 AM  
 CKND1 3.2 12/07/2018 11:35 AM  
 TROIQ 6.96 (HH) 12/08/2018 03:22 AM  
 TROIQ 13.40 ([facility]) 12/07/2018 11:35 AM  
  
Coagulation Recent Labs 12/08/18 
6018 APTT 33.1 Lipid Panel Lab Results Component Value Date/Time Cholesterol, total 155 12/08/2018 03:22 AM  
 HDL Cholesterol 42 12/08/2018 03:22 AM  
 LDL, calculated 98.6 12/08/2018 03:22 AM  
 VLDL, calculated 14.4 12/08/2018 03:22 AM  
 Triglyceride 72 12/08/2018 03:22 AM  
 CHOL/HDL Ratio 3.7 12/08/2018 03:22 AM  
  
BNP No results found for: BNP, BNPP, XBNPT Liver Enzymes Recent Labs 12/08/18 
9837 TP 6.4 ALB 3.3* AP 79 SGOT 15  
  
Digoxin Thyroid Studies Lab Results Component Value Date/Time TSH 1.040 06/09/2014 12:00 AM  
    
 
Signed By: Patti Garcia MD   
 December 8, 2018

## 2018-12-08 NOTE — DISCHARGE SUMMARY
[address] Lydia Santana  MR#: [id number]  : 1965  ACCOUNT #: [de-identified]   ADMIT DATE: 2018  DISCHARGE DATE: 2018    DISCHARGE DIAGNOSES:  1.  Non-ST-elevation myocardial infarction. 2.  Uncontrolled hypertension. SERVICE:  Patient was admitted to hospitalist service. CONSULTS:  Dr. Elli Hall was consulted for cardiology. PROCEDURES:  On 2018, patient underwent cardiac catheterization by Dr. Stephanie Valenzuela, findings showed nonobstructive epicardial coronary artery disease, preserved LV function. HISTORY OF PRESENT ILLNESS:  Please refer to admission H and P.  Briefly, the patient is a 59-year-old female with a past history significant for hypertension who came through the emergency department complaining of chest pain. She states that she was seen at [facility] for right shoulder pain, discharged, came back with chest pain, retrosternal, no radiation, associated with some mild shortness of breath ongoing for the past couple of days. She had a troponin I on admission of 0.66. Other biomarkers normal.    When we saw her, vital signs are stable and normal with a pressure of 144/77. She had a regular heart. Clear lungs. Benign abdomen. No calf tenderness or edema. Chest x-ray negative for acute process. Metabolic panel within normal limits. CBC normal with H and H of 11.3/34. EKG showed normal sinus rhythm, rate of 60, no significant changes compared to prior. HOSPITAL COURSE BY PROBLEM:  1.  Non-ST-elevation myocardial infarction:  Atypical presentation. Troponin I repeat, however, elevated to 13.40, she has a CPK of 319. From the time of admission, she was on Lovenox treatment dose. Echocardiogram showed an EF of 33-80%, grade I diastolic dysfunction, no regional wall motion abnormalities. We maintained her on the Lovenox treatment dose, as well as aspirin, Plavix, beta blockers and statin therapy.   She was already on ACE inhibitors as well. She was taken to the cath lab, reports as per above. She has been chest pain free, has been ambulating without any difficulty and she has no shortness of breath. As discussed with Cardiology, will be discharging home. She will be on medications below, which includes aspirin and Plavix, Plavix should be taken for a month, aspirin indefinitely. 2.  Hypertension, uncontrolled. Pressures have been elevated. We increased her lisinopril to 20 mg daily. We have also started her on metoprolol here. Outpatient followup. 3.  On examination today, vital signs are stable and normal with a pressure last at 178/99. She denies any fever, chills, nausea, vomiting, chest pain, shortness of breath, palpitation or edema. Ambulates without any difficulty. Would like to go home. She was found lying at bedside. She has a regular heart, clear lungs, benign abdomen. No calf tenderness or edema. Labs today includes a metabolic panel within normal limits, she had a troponin I of 6.96 this morning. Lipid panel included total cholesterol 155, HDL 42, LDL 98.6 and a triglyceride level of 72. DISPOSITION:  Home. MEDICATION ON DISCHARGE:  New medicines  as follows:  1. Aspirin 325 mg p.o. every day. 2.  Plavix 75 mg p.o. every day for 1 month. 3.  Lipitor 40 mg p.o. at bedtime. 4.  Lopressor 25 mg p.o. b.i.d.  5.  Lisinopril 20 mg p.o. every day, new dose. 6.  Sublingual nitroglycerin 0.4 mg under the tongue as needed for chest pain every 5 minutes. Resume the followin. Vitamin D3 1000 units p.o. b.i.d.  2.  Flexeril 5 mg p.o. q.p.m. as needed. 3.  Benadryl 25 mg q.6 hours as needed. 4.  Ferrous fumarate 325 mg p.o. b.i.d.  5.  Multivitamin p.o. every day. 6.  Trazodone 50 mg p.o. at bedtime p.r.n. sleep. Discontinue:  Naprosyn, Feldene. Prescriptions have been sent electronically to her pharmacy, pharmacy confirmed with the patient.     She should follow up with her PCP, Dr. Roc Villar in 7-10 days. With Cardiology in 3-4 weeks. TOTAL TIME ON DISCHARGE:  Greater than 30 minutes.       MD REYES DugganP/BIPIN  D: 12/08/2018 10:56     T: 12/08/2018 12:05  JOB #: [id number]  CC: Tami Kim MD

## 2018-12-08 NOTE — DISCHARGE INSTRUCTIONS

## 2018-12-10 ENCOUNTER — HOSPITAL ENCOUNTER (EMERGENCY)
Age: 53
Discharge: HOME | DRG: 282 | End: 2018-12-11
Attending: EMERGENCY MEDICINE
Payer: OTHER GOVERNMENT

## 2018-12-10 ENCOUNTER — APPOINTMENT (OUTPATIENT)
Dept: GENERAL RADIOLOGY | Age: 53
DRG: 282 | End: 2018-12-10
Attending: EMERGENCY MEDICINE
Payer: OTHER GOVERNMENT

## 2018-12-10 DIAGNOSIS — R07.89 ATYPICAL CHEST PAIN: Primary | ICD-10-CM

## 2018-12-10 LAB
ANION GAP SERPL CALC-SCNC: 12 MMOL/L (ref 3–18)
BASOPHILS # BLD: 0 K/UL (ref 0–0.1)
BASOPHILS NFR BLD: 0 % (ref 0–2)
BUN SERPL-MCNC: 10 MG/DL (ref 7–18)
BUN/CREAT SERPL: 13 (ref 12–20)
CALCIUM SERPL-MCNC: 8.7 MG/DL (ref 8.5–10.1)
CHLORIDE SERPL-SCNC: 104 MMOL/L (ref 100–108)
CO2 SERPL-SCNC: 26 MMOL/L (ref 21–32)
CREAT SERPL-MCNC: 0.79 MG/DL (ref 0.6–1.3)
DIFFERENTIAL METHOD BLD: ABNORMAL
EOSINOPHIL # BLD: 0.8 K/UL (ref 0–0.4)
EOSINOPHIL NFR BLD: 8 % (ref 0–5)
ERYTHROCYTE [DISTWIDTH] IN BLOOD BY AUTOMATED COUNT: 13 % (ref 11.6–14.5)
GLUCOSE SERPL-MCNC: 115 MG/DL (ref 74–99)
HCT VFR BLD AUTO: 34.9 % (ref 35–45)
HGB BLD-MCNC: 11.7 G/DL (ref 12–16)
LYMPHOCYTES # BLD: 3 K/UL (ref 0.9–3.6)
LYMPHOCYTES NFR BLD: 28 % (ref 21–52)
MCH RBC QN AUTO: 29.3 PG (ref 24–34)
MCHC RBC AUTO-ENTMCNC: 33.5 G/DL (ref 31–37)
MCV RBC AUTO: 87.5 FL (ref 74–97)
MONOCYTES # BLD: 1.1 K/UL (ref 0.05–1.2)
MONOCYTES NFR BLD: 11 % (ref 3–10)
NEUTS SEG # BLD: 5.6 K/UL (ref 1.8–8)
NEUTS SEG NFR BLD: 53 % (ref 40–73)
PLATELET # BLD AUTO: 308 K/UL (ref 135–420)
PMV BLD AUTO: 11.3 FL (ref 9.2–11.8)
POTASSIUM SERPL-SCNC: 3.4 MMOL/L (ref 3.5–5.5)
RBC # BLD AUTO: 3.99 M/UL (ref 4.2–5.3)
SODIUM SERPL-SCNC: 142 MMOL/L (ref 136–145)
WBC # BLD AUTO: 10.5 K/UL (ref 4.6–13.2)

## 2018-12-10 PROCEDURE — 94762 N-INVAS EAR/PLS OXIMTRY CONT: CPT

## 2018-12-10 PROCEDURE — 93005 ELECTROCARDIOGRAM TRACING: CPT

## 2018-12-10 PROCEDURE — 82553 CREATINE MB FRACTION: CPT

## 2018-12-10 PROCEDURE — 85025 COMPLETE CBC W/AUTO DIFF WBC: CPT

## 2018-12-10 PROCEDURE — 71045 X-RAY EXAM CHEST 1 VIEW: CPT

## 2018-12-10 PROCEDURE — 99285 EMERGENCY DEPT VISIT HI MDM: CPT

## 2018-12-10 PROCEDURE — 80048 BASIC METABOLIC PNL TOTAL CA: CPT

## 2018-12-10 NOTE — LETTER
[address] [place] [address] [facility] 
[address] [facility] [id number] [phone] Work/School Note Date: 12/10/2018 To Whom It May concern: 
 
Please excuse Filomena Shone from work 12/11/2018. Sincerely, Michell Hernandez RN BSN RAJAN NRP

## 2018-12-11 ENCOUNTER — HOSPITAL ENCOUNTER (INPATIENT)
Age: 53
LOS: 2 days | Discharge: HOME | DRG: 282 | End: 2018-12-14
Attending: EMERGENCY MEDICINE | Admitting: INTERNAL MEDICINE
Payer: OTHER GOVERNMENT

## 2018-12-11 VITALS
WEIGHT: 161 LBS | DIASTOLIC BLOOD PRESSURE: 84 MMHG | BODY MASS INDEX: 29.45 KG/M2 | OXYGEN SATURATION: 97 % | RESPIRATION RATE: 16 BRPM | SYSTOLIC BLOOD PRESSURE: 162 MMHG | HEART RATE: 72 BPM

## 2018-12-11 DIAGNOSIS — I21.4 NSTEMI (NON-ST ELEVATED MYOCARDIAL INFARCTION) (HCC): Primary | ICD-10-CM

## 2018-12-11 LAB
ATRIAL RATE: 71 BPM
CALCULATED P AXIS, ECG09: 58 DEGREES
CALCULATED R AXIS, ECG10: 17 DEGREES
CALCULATED T AXIS, ECG11: 8 DEGREES
CK MB CFR SERPL CALC: 0.6 % (ref 0–4)
CK MB SERPL-MCNC: 1 NG/ML (ref 5–25)
CK SERPL-CCNC: 176 U/L (ref 26–192)
DIAGNOSIS, 93000: NORMAL
P-R INTERVAL, ECG05: 142 MS
Q-T INTERVAL, ECG07: 372 MS
QRS DURATION, ECG06: 70 MS
QTC CALCULATION (BEZET), ECG08: 404 MS
TROPONIN I SERPL-MCNC: 1.89 NG/ML (ref 0–0.06)
TROPONIN I SERPL-MCNC: 2.13 NG/ML (ref 0–0.06)
VENTRICULAR RATE, ECG03: 71 BPM

## 2018-12-11 PROCEDURE — 74011250636 HC RX REV CODE- 250/636: Performed by: EMERGENCY MEDICINE

## 2018-12-11 PROCEDURE — 84484 ASSAY OF TROPONIN QUANT: CPT

## 2018-12-11 PROCEDURE — 93005 ELECTROCARDIOGRAM TRACING: CPT

## 2018-12-11 PROCEDURE — 74011250637 HC RX REV CODE- 250/637: Performed by: EMERGENCY MEDICINE

## 2018-12-11 PROCEDURE — 99285 EMERGENCY DEPT VISIT HI MDM: CPT

## 2018-12-11 RX ORDER — GUAIFENESIN 100 MG/5ML
200 SOLUTION ORAL
Status: DISCONTINUED | OUTPATIENT
Start: 2018-12-11 | End: 2018-12-11

## 2018-12-11 RX ORDER — AMOXICILLIN 250 MG/1
500 CAPSULE ORAL
Status: DISCONTINUED | OUTPATIENT
Start: 2018-12-11 | End: 2018-12-11

## 2018-12-11 RX ORDER — GUAIFENESIN 100 MG/5ML
200 SOLUTION ORAL
Qty: 1 BOTTLE | Refills: 0 | Status: SHIPPED | OUTPATIENT
Start: 2018-12-11 | End: 2018-12-11 | Stop reason: CLARIF

## 2018-12-11 RX ORDER — MORPHINE SULFATE 4 MG/ML
2 INJECTION INTRAVENOUS
Status: DISCONTINUED | OUTPATIENT
Start: 2018-12-11 | End: 2018-12-11 | Stop reason: HOSPADM

## 2018-12-11 RX ORDER — POTASSIUM CHLORIDE 20 MEQ/1
40 TABLET, EXTENDED RELEASE ORAL
Status: COMPLETED | OUTPATIENT
Start: 2018-12-11 | End: 2018-12-11

## 2018-12-11 RX ORDER — AMOXICILLIN 500 MG/1
500 TABLET, FILM COATED ORAL 3 TIMES DAILY
Qty: 30 TAB | Refills: 0 | Status: SHIPPED | OUTPATIENT
Start: 2018-12-11 | End: 2018-12-11 | Stop reason: CLARIF

## 2018-12-11 RX ADMIN — POTASSIUM CHLORIDE 40 MEQ: 20 TABLET, EXTENDED RELEASE ORAL at 00:32

## 2018-12-11 NOTE — Clinical Note
Contrast Dose Calculator:  
Patient's age: 46.  
Patient's sex: Female. Patient weight (kg) = 71.7. Creatinine level (mg/dL) = 0.73. Creatinine clearance (mL/min): 100. Contrast concentration (mg/mL) = 300. MACD = 300 mL. Max Contrast dose per Creatinine Cl calculator = 225 mL.

## 2018-12-11 NOTE — Clinical Note
TRANSFER - OUT REPORT:  
 
Verbal report given to: Belia Mcmahon RN. Report consisted of patient's Situation, Background, Assessment and  
Recommendations(SBAR). Opportunity for questions and clarification was provided. Patient transported with a Registered Nurse. Patient transported to: Lenore Celeste.

## 2018-12-11 NOTE — ED NOTES
Pt resting comfortably in bed; affect calm/conversant, respirations regular/non labored/skin warm/dry. Per pt pain decreased to 5/10. Plan of care for repeat lab work has been explained/understood. No distress noted at this time. Rails up x 2 with call light in reach/family present at bedside.

## 2018-12-11 NOTE — ED NOTES
Pt resting comfortably in bed; denies chest pain at this time. No distress noted. Affect calm/conversant, respirations regular/non labored. Rails up x 2 with call light in reach. Family present at bedside.

## 2018-12-11 NOTE — ED NOTES
Pt sitting up in bed without distress; affect remains calm/conversant/respirations regular/non labored.

## 2018-12-11 NOTE — ED PROVIDER NOTES
11:44 PM Pedro Leal is a 46 y.o. female with h/o HTN, Anemia, MI who presents to ED complaining of acute 8/10 right sided CP that started tonight after she was taking her night meds. Associated sx are palpitation. Denies diaphoresis. She had a negative heart catheterization on friday. She does not smoke or drink. PCP: Robert Erazo MD        The history is provided by the patient. Past Medical History:   Diagnosis Date    Anemia     Hypertension     MI (myocardial infarction) ([facility] [address]        Past Surgical History:   Procedure Laterality Date    HX APPENDECTOMY      HX HEART CATHETERIZATION           Family History:   Problem Relation Age of Onset    Breast Cancer Mother     Hypertension Mother     Diabetes Mother     Heart Failure Mother     Breast Cancer Sister     Breast Cancer Sister        Social History     Socioeconomic History    Marital status:      Spouse name: Not on file    Number of children: Not on file    Years of education: Not on file    Highest education level: Not on file   Social Needs    Financial resource strain: Not on file    Food insecurity - worry: Not on file    Food insecurity - inability: Not on file   [employer] needs - medical: Not on file   [employer] needs - non-medical: Not on file   Occupational History    Not on file   Tobacco Use    Smoking status: Never Smoker    Smokeless tobacco: Never Used   Substance and Sexual Activity    Alcohol use: No     Alcohol/week: 0.0 oz     Frequency: Never    Drug use: No    Sexual activity: Yes     Partners: Male     Birth control/protection: Surgical     Comment: tubal ligation   Other Topics Concern    Not on file   Social History Narrative    Not on file         ALLERGIES: Norvasc [amlodipine]    Review of Systems   Constitutional: Negative. Negative for diaphoresis. HENT: Negative. Eyes: Negative. Respiratory: Negative.     Cardiovascular: Positive for chest pain and palpitations. Gastrointestinal: Negative. Endocrine: Negative. Genitourinary: Negative. Musculoskeletal: Negative. Skin: Negative. Allergic/Immunologic: Negative. Neurological: Negative. Hematological: Negative. Psychiatric/Behavioral: Negative. All other systems reviewed and are negative. Vitals:    12/10/18 2340 12/11/18 0000 12/11/18 0002 12/11/18 0020   BP: (!) 214/96 (!) 194/94  (!) 181/93   Pulse: 72  69 67   Resp: 19  20 21   SpO2: 100%  99% 99%   Weight: 73 kg (161 lb)               Physical Exam   Constitutional: She is oriented to person, place, and time. She appears well-developed and well-nourished. No distress. HENT:   Head: Normocephalic. Right Ear: External ear normal.   Left Ear: External ear normal.   Mouth/Throat: No oropharyngeal exudate. Eyes: Conjunctivae and EOM are normal. Pupils are equal, round, and reactive to light. Right eye exhibits no discharge. Left eye exhibits no discharge. No scleral icterus. Neck: Normal range of motion. Neck supple. No JVD present. No tracheal deviation present. No thyromegaly present. Cardiovascular: Normal rate, regular rhythm, normal heart sounds and intact distal pulses. Exam reveals no gallop and no friction rub. No murmur heard. Pulmonary/Chest: Effort normal and breath sounds normal. No stridor. No respiratory distress. She has no wheezes. She has no rales. She exhibits no tenderness. Abdominal: Soft. Bowel sounds are normal. She exhibits no distension and no mass. There is no tenderness. There is no rebound and no guarding. Musculoskeletal: Normal range of motion. She exhibits no edema or tenderness. Lymphadenopathy:     She has no cervical adenopathy. Neurological: She is alert and oriented to person, place, and time. She displays normal reflexes. No cranial nerve deficit. She exhibits normal muscle tone. Coordination normal.   Skin: Skin is warm and dry. No rash noted. She is not diaphoretic.  No erythema. No pallor. Nursing note and vitals reviewed. MDM  Number of Diagnoses or Management Options  Diagnosis management comments: Mi, Angina, reflux, esophagitis, GERD, PUD, musculoskeletal pain,PNA         Procedures    Vitals:  Patient Vitals for the past 12 hrs:   Pulse Resp BP SpO2   12/11/18 0020 67 21 (!) 181/93 99 %   12/11/18 0002 69 20 -- 99 %   12/11/18 0000 -- -- (!) 194/94 --   12/10/18 2340 72 19 (!) 214/96 100 %       Medications Ordered:  Medications - No data to display    Lab Findings:  Recent Results (from the past 12 hour(s))   EKG, 12 LEAD, INITIAL    Collection Time: 12/10/18 11:35 PM   Result Value Ref Range    Ventricular Rate 71 BPM    Atrial Rate 71 BPM    P-R Interval 142 ms    QRS Duration 70 ms    Q-T Interval 372 ms    QTC Calculation (Bezet) 404 ms    Calculated P Axis 58 degrees    Calculated R Axis 17 degrees    Calculated T Axis 8 degrees    Diagnosis       Normal sinus rhythm  Possible Left atrial enlargement  Borderline ECG  When compared with ECG of 07-DEC-2018 01:40,  No significant change was found     CBC WITH AUTOMATED DIFF    Collection Time: 12/10/18 11:40 PM   Result Value Ref Range    WBC 10.5 4.6 - 13.2 K/uL    RBC 3.99 (L) 4.20 - 5.30 M/uL    HGB 11.7 (L) 12.0 - 16.0 g/dL    HCT 34.9 (L) 35.0 - 45.0 %    MCV 87.5 74.0 - 97.0 FL    MCH 29.3 24.0 - 34.0 PG    MCHC 33.5 31.0 - 37.0 g/dL    RDW 13.0 11.6 - 14.5 %    PLATELET 672 763 - 560 K/uL    MPV 11.3 9.2 - 11.8 FL    NEUTROPHILS 53 40 - 73 %    LYMPHOCYTES 28 21 - 52 %    MONOCYTES 11 (H) 3 - 10 %    EOSINOPHILS 8 (H) 0 - 5 %    BASOPHILS 0 0 - 2 %    ABS. NEUTROPHILS 5.6 1.8 - 8.0 K/UL    ABS. LYMPHOCYTES 3.0 0.9 - 3.6 K/UL    ABS. MONOCYTES 1.1 0.05 - 1.2 K/UL    ABS. EOSINOPHILS 0.8 (H) 0.0 - 0.4 K/UL    ABS.  BASOPHILS 0.0 0.0 - 0.1 K/UL    DF AUTOMATED     METABOLIC PANEL, BASIC    Collection Time: 12/10/18 11:40 PM   Result Value Ref Range    Sodium 142 136 - 145 mmol/L    Potassium 3.4 (L) 3.5 - 5.5 mmol/L    Chloride 104 100 - 108 mmol/L    CO2 26 21 - 32 mmol/L    Anion gap 12 3.0 - 18 mmol/L    Glucose 115 (H) 74 - 99 mg/dL    BUN 10 7.0 - 18 MG/DL    Creatinine 0.79 0.6 - 1.3 MG/DL    BUN/Creatinine ratio 13 12 - 20      GFR est AA >60 >60 ml/min/1.73m2    GFR est non-AA >60 >60 ml/min/1.73m2    Calcium 8.7 8.5 - 10.1 MG/DL   CARDIAC PANEL,(CK, CKMB & TROPONIN)    Collection Time: 12/10/18 11:40 PM   Result Value Ref Range     26 - 192 U/L    CK - MB 1.0 <3.6 ng/ml    CK-MB Index 0.6 0.0 - 4.0 %    Troponin-I, Qt. 2.13 (HH) 0.00 - 0.06 NG/ML       EKG Interpretation by ED physician:  2319 NSR 71 No Stemi    X-ray, CT or radiology findings or impressions:  XR CHEST PORT    (Results Pending)       Progress notes, consult notes, or additional procedure notes:  12:34 AM Consult: I discussed care with Hilda Jones ([facility]). It was a standard discussion including patient history, chief complaint, available diagnostic results, and predicted treatment course. Repeat trop in 3 hours, if negative discharge home with follow up to office in the AM.      Diagnosis:     Disposition: D/C    Follow-up Information    None             Medication List      ASK your doctor about these medications    aspirin 325 mg tablet  Commonly known as:  ASPIRIN  Take 1 Tab by mouth daily. atorvastatin 40 mg tablet  Commonly known as:  LIPITOR  Take 1 Tab by mouth nightly. cholecalciferol 1,000 unit Cap  Commonly known as:  VITAMIN D3     clopidogrel 75 mg Tab  Commonly known as:  PLAVIX  Take 1 Tab by mouth daily. Take 1 Tab by mouth daily for 1 month. cyclobenzaprine 5 mg tablet  Commonly known as:  FLEXERIL  Take 1 Tab by mouth nightly. diphenhydrAMINE 25 mg capsule  Commonly known as:  BENADRYL     IRON PO     lisinopril 20 mg tablet  Commonly known as:  PRINIVIL, ZESTRIL  Take 1 Tab by mouth daily.      metoprolol tartrate 25 mg tablet  Commonly known as:  LOPRESSOR  Take 1 Tab by mouth every twelve (12) hours.     MULTIVITAMIN WITH IRON PO     nitroglycerin 0.4 mg SL tablet  Commonly known as:  NITROSTAT  1 Tab by SubLINGual route every five (5) minutes as needed for Chest Pain. Up to 3 doses. traZODone 50 mg tablet  Commonly known as:  DESYREL  TAKE 1 TABLET BY MOUTH NIGHTLY. GENERIC FOR DESYREL            Scribe Attestation     Rafaela Clark acting as a scribe for and in the presence of Franklyn Amezquita MD      December 10, 2018 at 11:48 PM       Provider Attestation:      I personally performed the services described in the documentation, reviewed the documentation, as recorded by the scribe in my presence, and it accurately and completely records my words and actions.  December 10, 2018 at 11:48 PM - Franklyn Amezquita MD

## 2018-12-11 NOTE — Clinical Note
TRANSFER - IN REPORT:  
 
Verbal report received from: Jarvis Tyson RN. Report consisted of patient's Situation, Background, Assessment and  
Recommendations(SBAR). Opportunity for questions and clarification was provided. Assessment completed upon patient's arrival to unit and care assumed. Patient transported with a Registered Nurse.

## 2018-12-12 ENCOUNTER — APPOINTMENT (OUTPATIENT)
Dept: NON INVASIVE DIAGNOSTICS | Age: 53
DRG: 282 | End: 2018-12-12
Attending: PHYSICIAN ASSISTANT
Payer: OTHER GOVERNMENT

## 2018-12-12 PROBLEM — I10 ACCELERATED HYPERTENSION: Status: ACTIVE | Noted: 2018-12-12

## 2018-12-12 PROBLEM — I25.10 CORONARY ARTERY DISEASE INVOLVING NATIVE CORONARY ARTERY: Status: ACTIVE | Noted: 2018-12-12

## 2018-12-12 PROBLEM — Z91.199 NONCOMPLIANCE: Status: ACTIVE | Noted: 2018-12-12

## 2018-12-12 PROBLEM — I21.4 NSTEMI (NON-ST ELEVATED MYOCARDIAL INFARCTION) (HCC): Status: ACTIVE | Noted: 2018-12-12

## 2018-12-12 LAB
ALBUMIN SERPL-MCNC: 3.8 G/DL (ref 3.4–5)
ALBUMIN/GLOB SERPL: 1.2 {RATIO} (ref 0.8–1.7)
ALP SERPL-CCNC: 103 U/L (ref 45–117)
ALT SERPL-CCNC: 40 U/L (ref 13–56)
ANION GAP SERPL CALC-SCNC: 13 MMOL/L (ref 3–18)
AST SERPL-CCNC: 53 U/L (ref 15–37)
ATRIAL RATE: 62 BPM
BASOPHILS # BLD: 0 K/UL (ref 0–0.1)
BASOPHILS NFR BLD: 0 % (ref 0–2)
BILIRUB SERPL-MCNC: 0.2 MG/DL (ref 0.2–1)
BUN SERPL-MCNC: 10 MG/DL (ref 7–18)
BUN/CREAT SERPL: 14 (ref 12–20)
CALCIUM SERPL-MCNC: 8.7 MG/DL (ref 8.5–10.1)
CALCULATED P AXIS, ECG09: 56 DEGREES
CALCULATED R AXIS, ECG10: 23 DEGREES
CALCULATED T AXIS, ECG11: 25 DEGREES
CHLORIDE SERPL-SCNC: 104 MMOL/L (ref 100–108)
CK MB CFR SERPL CALC: 2.1 % (ref 0–4)
CK MB CFR SERPL CALC: 2.7 % (ref 0–4)
CK MB CFR SERPL CALC: 2.7 % (ref 0–4)
CK MB SERPL-MCNC: 14.6 NG/ML (ref 5–25)
CK MB SERPL-MCNC: 15.8 NG/ML (ref 5–25)
CK MB SERPL-MCNC: 9.8 NG/ML (ref 5–25)
CK SERPL-CCNC: 457 U/L (ref 26–192)
CK SERPL-CCNC: 537 U/L (ref 26–192)
CK SERPL-CCNC: 576 U/L (ref 26–192)
CO2 SERPL-SCNC: 25 MMOL/L (ref 21–32)
CREAT SERPL-MCNC: 0.73 MG/DL (ref 0.6–1.3)
DIAGNOSIS, 93000: NORMAL
DIFFERENTIAL METHOD BLD: ABNORMAL
ECHO LV INTERNAL DIMENSION DIASTOLIC: 3.79 CM (ref 3.9–5.3)
ECHO LV INTERNAL DIMENSION SYSTOLIC: 2.38 CM
ECHO LV IVSD: 1.22 CM (ref 0.6–0.9)
ECHO LV MASS 2D: 183.3 G (ref 67–162)
ECHO LV MASS INDEX 2D: 106 G/M2 (ref 43–95)
ECHO LV POSTERIOR WALL DIASTOLIC: 1.24 CM (ref 0.6–0.9)
ECHO TV REGURGITANT MAX VELOCITY: 281.22 CM/S
ECHO TV REGURGITANT PEAK GRADIENT: 31.6 MMHG
EOSINOPHIL # BLD: 0.8 K/UL (ref 0–0.4)
EOSINOPHIL NFR BLD: 9 % (ref 0–5)
ERYTHROCYTE [DISTWIDTH] IN BLOOD BY AUTOMATED COUNT: 13.2 % (ref 11.6–14.5)
ERYTHROCYTE [SEDIMENTATION RATE] IN BLOOD: 29 MM/HR (ref 0–30)
FLUAV AG NPH QL IA: NEGATIVE
FLUBV AG NOSE QL IA: NEGATIVE
GLOBULIN SER CALC-MCNC: 3.3 G/DL (ref 2–4)
GLUCOSE SERPL-MCNC: 113 MG/DL (ref 74–99)
HCT VFR BLD AUTO: 35.7 % (ref 35–45)
HGB BLD-MCNC: 11.9 G/DL (ref 12–16)
INR PPP: 1.1 (ref 0.8–1.2)
LYMPHOCYTES # BLD: 2.5 K/UL (ref 0.9–3.6)
LYMPHOCYTES NFR BLD: 28 % (ref 21–52)
MCH RBC QN AUTO: 29.1 PG (ref 24–34)
MCHC RBC AUTO-ENTMCNC: 33.3 G/DL (ref 31–37)
MCV RBC AUTO: 87.3 FL (ref 74–97)
MONOCYTES # BLD: 1.3 K/UL (ref 0.05–1.2)
MONOCYTES NFR BLD: 14 % (ref 3–10)
NEUTS SEG # BLD: 4.3 K/UL (ref 1.8–8)
NEUTS SEG NFR BLD: 49 % (ref 40–73)
P-R INTERVAL, ECG05: 156 MS
PLATELET # BLD AUTO: 290 K/UL (ref 135–420)
PMV BLD AUTO: 12 FL (ref 9.2–11.8)
POTASSIUM SERPL-SCNC: 3.4 MMOL/L (ref 3.5–5.5)
PROT SERPL-MCNC: 7.1 G/DL (ref 6.4–8.2)
PROTHROMBIN TIME: 14.1 SEC (ref 11.5–15.2)
Q-T INTERVAL, ECG07: 396 MS
QRS DURATION, ECG06: 70 MS
QTC CALCULATION (BEZET), ECG08: 401 MS
RBC # BLD AUTO: 4.09 M/UL (ref 4.2–5.3)
SODIUM SERPL-SCNC: 142 MMOL/L (ref 136–145)
TROPONIN I SERPL-MCNC: 14.7 NG/ML (ref 0–0.04)
TROPONIN I SERPL-MCNC: 7.66 NG/ML (ref 0–0.06)
TROPONIN I SERPL-MCNC: 9.76 NG/ML (ref 0–0.04)
VENTRICULAR RATE, ECG03: 62 BPM
WBC # BLD AUTO: 8.9 K/UL (ref 4.6–13.2)

## 2018-12-12 PROCEDURE — 74011000250 HC RX REV CODE- 250: Performed by: INTERNAL MEDICINE

## 2018-12-12 PROCEDURE — 74011250636 HC RX REV CODE- 250/636: Performed by: INTERNAL MEDICINE

## 2018-12-12 PROCEDURE — 85610 PROTHROMBIN TIME: CPT

## 2018-12-12 PROCEDURE — 93454 CORONARY ARTERY ANGIO S&I: CPT | Performed by: INTERNAL MEDICINE

## 2018-12-12 PROCEDURE — B2111ZZ FLUOROSCOPY OF MULTIPLE CORONARY ARTERIES USING LOW OSMOLAR CONTRAST: ICD-10-PCS | Performed by: INTERNAL MEDICINE

## 2018-12-12 PROCEDURE — 85652 RBC SED RATE AUTOMATED: CPT

## 2018-12-12 PROCEDURE — 82553 CREATINE MB FRACTION: CPT

## 2018-12-12 PROCEDURE — C1894 INTRO/SHEATH, NON-LASER: HCPCS | Performed by: INTERNAL MEDICINE

## 2018-12-12 PROCEDURE — C1760 CLOSURE DEV, VASC: HCPCS | Performed by: INTERNAL MEDICINE

## 2018-12-12 PROCEDURE — 85025 COMPLETE CBC W/AUTO DIFF WBC: CPT

## 2018-12-12 PROCEDURE — 87804 INFLUENZA ASSAY W/OPTIC: CPT

## 2018-12-12 PROCEDURE — 74011250637 HC RX REV CODE- 250/637: Performed by: INTERNAL MEDICINE

## 2018-12-12 PROCEDURE — 80053 COMPREHEN METABOLIC PANEL: CPT

## 2018-12-12 PROCEDURE — 74011250636 HC RX REV CODE- 250/636

## 2018-12-12 PROCEDURE — 87081 CULTURE SCREEN ONLY: CPT

## 2018-12-12 PROCEDURE — 84484 ASSAY OF TROPONIN QUANT: CPT

## 2018-12-12 PROCEDURE — 99152 MOD SED SAME PHYS/QHP 5/>YRS: CPT | Performed by: INTERNAL MEDICINE

## 2018-12-12 PROCEDURE — 74011250637 HC RX REV CODE- 250/637: Performed by: EMERGENCY MEDICINE

## 2018-12-12 PROCEDURE — 65660000000 HC RM CCU STEPDOWN

## 2018-12-12 PROCEDURE — 93321 DOPPLER ECHO F-UP/LMTD STD: CPT

## 2018-12-12 PROCEDURE — 82550 ASSAY OF CK (CPK): CPT

## 2018-12-12 PROCEDURE — 74011000258 HC RX REV CODE- 258: Performed by: INTERNAL MEDICINE

## 2018-12-12 PROCEDURE — 77030004558 HC CATH ANGI DX SUPR TORQ CARD -A: Performed by: INTERNAL MEDICINE

## 2018-12-12 PROCEDURE — C9113 INJ PANTOPRAZOLE SODIUM, VIA: HCPCS | Performed by: INTERNAL MEDICINE

## 2018-12-12 PROCEDURE — 36415 COLL VENOUS BLD VENIPUNCTURE: CPT

## 2018-12-12 PROCEDURE — 77030013797 HC KT TRNSDUC PRSSR EDWD -A: Performed by: INTERNAL MEDICINE

## 2018-12-12 PROCEDURE — 3E073GC INTRODUCTION OF OTHER THERAPEUTIC SUBSTANCE INTO CORONARY ARTERY, PERCUTANEOUS APPROACH: ICD-10-PCS | Performed by: INTERNAL MEDICINE

## 2018-12-12 PROCEDURE — 74011636320 HC RX REV CODE- 636/320: Performed by: INTERNAL MEDICINE

## 2018-12-12 RX ORDER — NALOXONE HYDROCHLORIDE 0.4 MG/ML
0.4 INJECTION, SOLUTION INTRAMUSCULAR; INTRAVENOUS; SUBCUTANEOUS AS NEEDED
Status: DISCONTINUED | OUTPATIENT
Start: 2018-12-12 | End: 2018-12-14 | Stop reason: HOSPADM

## 2018-12-12 RX ORDER — ENOXAPARIN SODIUM 100 MG/ML
1 INJECTION SUBCUTANEOUS EVERY 12 HOURS
Status: DISCONTINUED | OUTPATIENT
Start: 2018-12-12 | End: 2018-12-14 | Stop reason: HOSPADM

## 2018-12-12 RX ORDER — METOPROLOL TARTRATE 25 MG/1
25 TABLET, FILM COATED ORAL EVERY 12 HOURS
Status: DISCONTINUED | OUTPATIENT
Start: 2018-12-12 | End: 2018-12-13

## 2018-12-12 RX ORDER — DOCUSATE SODIUM 100 MG/1
100 CAPSULE, LIQUID FILLED ORAL
Status: DISCONTINUED | OUTPATIENT
Start: 2018-12-12 | End: 2018-12-14 | Stop reason: HOSPADM

## 2018-12-12 RX ORDER — GUAIFENESIN 100 MG/5ML
81 LIQUID (ML) ORAL DAILY
Status: DISCONTINUED | OUTPATIENT
Start: 2018-12-12 | End: 2018-12-14 | Stop reason: HOSPADM

## 2018-12-12 RX ORDER — LIDOCAINE HYDROCHLORIDE 10 MG/ML
INJECTION, SOLUTION EPIDURAL; INFILTRATION; INTRACAUDAL; PERINEURAL AS NEEDED
Status: DISCONTINUED | OUTPATIENT
Start: 2018-12-12 | End: 2018-12-12 | Stop reason: HOSPADM

## 2018-12-12 RX ORDER — ISOSORBIDE MONONITRATE 60 MG/1
60 TABLET, EXTENDED RELEASE ORAL DAILY
Status: DISCONTINUED | OUTPATIENT
Start: 2018-12-12 | End: 2018-12-13

## 2018-12-12 RX ORDER — OXYCODONE AND ACETAMINOPHEN 5; 325 MG/1; MG/1
1 TABLET ORAL
Status: DISCONTINUED | OUTPATIENT
Start: 2018-12-12 | End: 2018-12-14 | Stop reason: HOSPADM

## 2018-12-12 RX ORDER — GUAIFENESIN 100 MG/5ML
162 LIQUID (ML) ORAL
Status: DISCONTINUED | OUTPATIENT
Start: 2018-12-12 | End: 2018-12-12 | Stop reason: DRUGHIGH

## 2018-12-12 RX ORDER — CLOPIDOGREL BISULFATE 75 MG/1
75 TABLET ORAL DAILY
Status: DISCONTINUED | OUTPATIENT
Start: 2018-12-12 | End: 2018-12-14 | Stop reason: HOSPADM

## 2018-12-12 RX ORDER — SODIUM CHLORIDE 0.9 % (FLUSH) 0.9 %
5-10 SYRINGE (ML) INJECTION EVERY 8 HOURS
Status: DISCONTINUED | OUTPATIENT
Start: 2018-12-12 | End: 2018-12-14 | Stop reason: HOSPADM

## 2018-12-12 RX ORDER — ASPIRIN 325 MG
325 TABLET ORAL DAILY
Status: DISCONTINUED | OUTPATIENT
Start: 2018-12-12 | End: 2018-12-12

## 2018-12-12 RX ORDER — ACETAMINOPHEN 325 MG/1
650 TABLET ORAL
Status: DISCONTINUED | OUTPATIENT
Start: 2018-12-12 | End: 2018-12-14 | Stop reason: HOSPADM

## 2018-12-12 RX ORDER — NITROGLYCERIN 0.4 MG/1
0.4 TABLET SUBLINGUAL
Status: DISCONTINUED | OUTPATIENT
Start: 2018-12-12 | End: 2018-12-14 | Stop reason: HOSPADM

## 2018-12-12 RX ORDER — ONDANSETRON 2 MG/ML
4 INJECTION INTRAMUSCULAR; INTRAVENOUS
Status: DISCONTINUED | OUTPATIENT
Start: 2018-12-12 | End: 2018-12-14 | Stop reason: HOSPADM

## 2018-12-12 RX ORDER — MIDAZOLAM HYDROCHLORIDE 1 MG/ML
INJECTION, SOLUTION INTRAMUSCULAR; INTRAVENOUS AS NEEDED
Status: DISCONTINUED | OUTPATIENT
Start: 2018-12-12 | End: 2018-12-12 | Stop reason: HOSPADM

## 2018-12-12 RX ORDER — ATORVASTATIN CALCIUM 40 MG/1
40 TABLET, FILM COATED ORAL
Status: DISCONTINUED | OUTPATIENT
Start: 2018-12-12 | End: 2018-12-14 | Stop reason: HOSPADM

## 2018-12-12 RX ORDER — HYDRALAZINE HYDROCHLORIDE 20 MG/ML
10 INJECTION INTRAMUSCULAR; INTRAVENOUS
Status: DISCONTINUED | OUTPATIENT
Start: 2018-12-12 | End: 2018-12-14 | Stop reason: HOSPADM

## 2018-12-12 RX ORDER — LISINOPRIL 20 MG/1
20 TABLET ORAL DAILY
Status: DISCONTINUED | OUTPATIENT
Start: 2018-12-12 | End: 2018-12-14 | Stop reason: HOSPADM

## 2018-12-12 RX ORDER — MORPHINE SULFATE 2 MG/ML
1 INJECTION, SOLUTION INTRAMUSCULAR; INTRAVENOUS
Status: DISCONTINUED | OUTPATIENT
Start: 2018-12-12 | End: 2018-12-14 | Stop reason: HOSPADM

## 2018-12-12 RX ORDER — ENALAPRILAT 1.25 MG/ML
1.25 INJECTION INTRAVENOUS
Status: DISCONTINUED | OUTPATIENT
Start: 2018-12-12 | End: 2018-12-14 | Stop reason: HOSPADM

## 2018-12-12 RX ORDER — FENTANYL CITRATE 50 UG/ML
INJECTION, SOLUTION INTRAMUSCULAR; INTRAVENOUS AS NEEDED
Status: DISCONTINUED | OUTPATIENT
Start: 2018-12-12 | End: 2018-12-12 | Stop reason: HOSPADM

## 2018-12-12 RX ORDER — SODIUM CHLORIDE 0.9 % (FLUSH) 0.9 %
5-10 SYRINGE (ML) INJECTION AS NEEDED
Status: DISCONTINUED | OUTPATIENT
Start: 2018-12-12 | End: 2018-12-14 | Stop reason: HOSPADM

## 2018-12-12 RX ADMIN — NITROGLYCERIN 0.5 INCH: 20 OINTMENT TOPICAL at 01:33

## 2018-12-12 RX ADMIN — POTASSIUM CHLORIDE: 2 INJECTION, SOLUTION, CONCENTRATE INTRAVENOUS at 08:00

## 2018-12-12 RX ADMIN — MORPHINE SULFATE 1 MG: 2 INJECTION, SOLUTION INTRAMUSCULAR; INTRAVENOUS at 07:32

## 2018-12-12 RX ADMIN — ENALAPRILAT 1.25 MG: 1.25 INJECTION INTRAVENOUS at 07:32

## 2018-12-12 RX ADMIN — ASPIRIN 81 MG 81 MG: 81 TABLET ORAL at 11:34

## 2018-12-12 RX ADMIN — Medication 10 ML: at 22:13

## 2018-12-12 RX ADMIN — CLOPIDOGREL BISULFATE 75 MG: 75 TABLET ORAL at 11:34

## 2018-12-12 RX ADMIN — ACETAMINOPHEN 650 MG: 325 TABLET ORAL at 13:57

## 2018-12-12 RX ADMIN — METOPROLOL TARTRATE 25 MG: 25 TABLET ORAL at 20:47

## 2018-12-12 RX ADMIN — Medication 10 ML: at 16:25

## 2018-12-12 RX ADMIN — ATORVASTATIN CALCIUM 40 MG: 40 TABLET, FILM COATED ORAL at 22:12

## 2018-12-12 RX ADMIN — ISOSORBIDE MONONITRATE 60 MG: 60 TABLET, EXTENDED RELEASE ORAL at 11:34

## 2018-12-12 RX ADMIN — ACETAMINOPHEN 650 MG: 325 TABLET ORAL at 17:44

## 2018-12-12 RX ADMIN — MORPHINE SULFATE 1 MG: 2 INJECTION, SOLUTION INTRAMUSCULAR; INTRAVENOUS at 11:58

## 2018-12-12 RX ADMIN — ENOXAPARIN SODIUM 70 MG: 80 INJECTION SUBCUTANEOUS at 06:45

## 2018-12-12 RX ADMIN — POTASSIUM CHLORIDE: 2 INJECTION, SOLUTION, CONCENTRATE INTRAVENOUS at 09:00

## 2018-12-12 RX ADMIN — METOPROLOL TARTRATE 25 MG: 25 TABLET ORAL at 11:34

## 2018-12-12 RX ADMIN — SODIUM CHLORIDE 40 MG: 9 INJECTION, SOLUTION INTRAMUSCULAR; INTRAVENOUS; SUBCUTANEOUS at 07:32

## 2018-12-12 RX ADMIN — OXYCODONE AND ACETAMINOPHEN 1 TABLET: 5; 325 TABLET ORAL at 20:49

## 2018-12-12 RX ADMIN — LISINOPRIL 20 MG: 20 TABLET ORAL at 11:34

## 2018-12-12 NOTE — ED NOTES
Dr. Ian Allen talking to family members about admission. Pt's family member discusting admission to [facility].

## 2018-12-12 NOTE — PROGRESS NOTES
TRANSFER - IN REPORT:    Verbal report received from [facility] on Carmen Harvey  being received from [address] [place] [address] for routine post - op      Report consisted of patients Situation, Background, Assessment and   Recommendations(SBAR). Information from the following report(s) SBAR, Procedure Summary and MAR was reviewed with the receiving nurse. Opportunity for questions and clarification was provided. Assessment completed upon patients arrival to unit and care assumed.

## 2018-12-12 NOTE — ED PROVIDER NOTES
EMERGENCY DEPARTMENT HISTORY AND PHYSICAL EXAM    11:45 PM      Date: 12/11/2018  Patient Name: Makayla Mason    History of Presenting Illness     Chief Complaint   Patient presents with    Chest Pain     History Provided By: Patient    Chief Complaint: chest pain  Duration:  Hours  Timing:  Acute  Location: chest  Quality: n/a  Severity: Moderate  Modifying Factors: Breathing / coughing exacerbates the pain  Associated Symptoms: cough, congestion    Additional History (Context): 11:49 PM Makayla Mason is a 46 y.o. female with h/o anemia, HTN, MI who presents to ED complaining of moderate acute chest pain with onset a few hours ago with associated symptoms of cough, congestion. Patient was seen in the ED and had a cardiac work up. Did not follow up with cardiology this morning. However, reports that this cough is in the setting of a cold as she is experiencing congestion and coughing. Coughing/ breathing exacerbates the pain. No other concerns or symptoms at this time. PCP: Samuel Foss MD    Current Facility-Administered Medications   Medication Dose Route Frequency Provider Last Rate Last Dose    nitroglycerin (NITROBID) 2 % ointment 1 Inch  1 Inch Topical NOW Aditya Paz MD        aspirin chewable tablet 162 mg  162 mg Oral NOW Aditya Paz MD        nitroglycerin (NITROBID) 2 % ointment 0.5 Inch  0.5 Inch Topical NOW Aditya Paz MD         Current Outpatient Medications   Medication Sig Dispense Refill    clopidogrel (PLAVIX) 75 mg tab Take 1 Tab by mouth daily. Take 1 Tab by mouth daily for 1 month. 30 Tab 0    aspirin (ASPIRIN) 325 mg tablet Take 1 Tab by mouth daily. 30 Tab 1    lisinopril (PRINIVIL, ZESTRIL) 20 mg tablet Take 1 Tab by mouth daily. 30 Tab 1    atorvastatin (LIPITOR) 40 mg tablet Take 1 Tab by mouth nightly. 30 Tab 1    metoprolol tartrate (LOPRESSOR) 25 mg tablet Take 1 Tab by mouth every twelve (12) hours.  60 Tab 1    nitroglycerin (NITROSTAT) 0.4 mg SL tablet 1 Tab by SubLINGual route every five (5) minutes as needed for Chest Pain. Up to 3 doses. 1 Bottle 1    traZODone (DESYREL) 50 mg tablet TAKE 1 TABLET BY MOUTH NIGHTLY. GENERIC FOR DESYREL 90 Tab 0    cyclobenzaprine (FLEXERIL) 5 mg tablet Take 1 Tab by mouth nightly. 7 Tab 0    diphenhydrAMINE (BENADRYL) 25 mg capsule Take 25 mg by mouth as needed.  MULTIVITAMIN WITH IRON PO Take 1 Tab by mouth daily.  FERROUS FUMARATE (IRON PO) Take 325 mg by mouth two (2) times a day.  Cholecalciferol, Vitamin D3, 1,000 unit cap Take 1,000 Units by mouth two (2) times a day. Past History     Past Medical History:  Past Medical History:   Diagnosis Date    Anemia     Hypertension     MI (myocardial infarction) ([facility] [address]        Past Surgical History:  Past Surgical History:   Procedure Laterality Date    HX APPENDECTOMY      HX HEART CATHETERIZATION         Family History:  Family History   Problem Relation Age of Onset    Breast Cancer Mother     Hypertension Mother     Diabetes Mother     Heart Failure Mother     Breast Cancer Sister     Breast Cancer Sister        Social History:  Social History     Tobacco Use    Smoking status: Never Smoker    Smokeless tobacco: Never Used   Substance Use Topics    Alcohol use: No     Alcohol/week: 0.0 oz     Frequency: Never    Drug use: No       Allergies: Allergies   Allergen Reactions    Norvasc [Amlodipine] Swelling     Ankle swelling          Review of Systems     Review of Systems   Constitutional: Negative for fever. Respiratory: Positive for cough. Cardiovascular: Positive for chest pain. All other systems reviewed and are negative. Physical Exam     Visit Vitals  BP (!) 184/101   Pulse 69   Temp 99.3 °F (37.4 °C)   Resp 16   SpO2 99%     Physical Exam   Constitutional: She is oriented to person, place, and time. She appears well-developed and well-nourished. No distress. HENT:   Head: Normocephalic.    Right Ear: External ear normal.   Left Ear: External ear normal.   Mouth/Throat: No oropharyngeal exudate. Eyes: Conjunctivae and EOM are normal. Pupils are equal, round, and reactive to light. Right eye exhibits no discharge. Left eye exhibits no discharge. No scleral icterus. Neck: Normal range of motion. Neck supple. No JVD present. No tracheal deviation present. No thyromegaly present. Cardiovascular: Normal rate, regular rhythm, normal heart sounds and intact distal pulses. Exam reveals no gallop and no friction rub. No murmur heard. Pulmonary/Chest: Effort normal and breath sounds normal. No stridor. No respiratory distress. She has no wheezes. She has no rales. She exhibits no tenderness. Abdominal: Soft. Bowel sounds are normal. She exhibits no distension and no mass. There is no tenderness. There is no rebound and no guarding. Musculoskeletal: Normal range of motion. She exhibits no edema or tenderness. Lymphadenopathy:     She has no cervical adenopathy. Neurological: She is alert and oriented to person, place, and time. She displays normal reflexes. No cranial nerve deficit. She exhibits normal muscle tone. Coordination normal.   Skin: Skin is warm and dry. No rash noted. She is not diaphoretic. No erythema. No pallor. Nursing note and vitals reviewed.         Diagnostic Study Results     Labs -  Recent Results (from the past 12 hour(s))   EKG, 12 LEAD, INITIAL    Collection Time: 12/11/18 11:57 PM   Result Value Ref Range    Ventricular Rate 62 BPM    Atrial Rate 62 BPM    P-R Interval 156 ms    QRS Duration 70 ms    Q-T Interval 396 ms    QTC Calculation (Bezet) 401 ms    Calculated P Axis 56 degrees    Calculated R Axis 23 degrees    Calculated T Axis 25 degrees    Diagnosis       Normal sinus rhythm  Possible Left atrial enlargement  Borderline ECG  When compared with ECG of 10-DEC-2018 23:35,  No significant change was found     CK-MB,QT    Collection Time: 12/12/18 12:01 AM   Result Value Ref Range CK - MB 9.8 (H) <3.6 ng/ml    CK-MB Index 2.1 0.0 - 4.0 %   CK    Collection Time: 12/12/18 12:01 AM   Result Value Ref Range     (H) 26 - 192 U/L   TROPONIN I    Collection Time: 12/12/18 12:07 AM   Result Value Ref Range    Troponin-I, Qt. 7.66 (HH) 0.00 - 0.06 NG/ML   STREP THROAT SCREEN    Collection Time: 12/12/18 12:15 AM   Result Value Ref Range    Special Requests: NO SPECIAL REQUESTS      Strep Screen NEGATIVE       Culture result: PENDING    INFLUENZA A & B AG (RAPID TEST)    Collection Time: 12/12/18 12:19 AM   Result Value Ref Range    Influenza A Antigen NEGATIVE  NEG      Influenza B Antigen NEGATIVE  NEG         Radiologic Studies -   No orders to display         Medical Decision Making   I am the first provider for this patient. I reviewed the vital signs, available nursing notes, past medical history, past surgical history, family history and social history. Vital Signs-Reviewed the patient's vital signs. Pulse Oximetry Analysis -  99% on room air -stable    EKG: Interpreted by the EP. Time Interpreted: 00:01   Rate: 62   Rhythm: Normal Sinus Rhythm    Interpretation: No stemi    Records Reviewed: Nursing Notes (Time of Review: 11:45 PM)    ED Course: Progress Notes, Reevaluation, and Consults:  Consult:  Discussed care with Dr. Kori Vega (hospitalist) Standard discussion; including history of patients chief complaint, available diagnostic results, and treatment course. Agrees to admit. 1:42 AM, 12/11/2018     Provider Notes (Medical Decision Making): Idiopathic NSTEMI, elevated troponin of unknown etiology, mild carditis, viral carditis      Diagnosis     Clinical Impression:   1. NSTEMI (non-ST elevated myocardial infarction) [facility])        Disposition: admitted    Follow-up Information    None             Medication List      ASK your doctor about these medications    aspirin 325 mg tablet  Commonly known as:  ASPIRIN  Take 1 Tab by mouth daily.      atorvastatin 40 mg tablet  Commonly known as:  LIPITOR  Take 1 Tab by mouth nightly. cholecalciferol 1,000 unit Cap  Commonly known as:  VITAMIN D3     clopidogrel 75 mg Tab  Commonly known as:  PLAVIX  Take 1 Tab by mouth daily. Take 1 Tab by mouth daily for 1 month. cyclobenzaprine 5 mg tablet  Commonly known as:  FLEXERIL  Take 1 Tab by mouth nightly. diphenhydrAMINE 25 mg capsule  Commonly known as:  BENADRYL     IRON PO     lisinopril 20 mg tablet  Commonly known as:  PRINIVIL, ZESTRIL  Take 1 Tab by mouth daily. metoprolol tartrate 25 mg tablet  Commonly known as:  LOPRESSOR  Take 1 Tab by mouth every twelve (12) hours. MULTIVITAMIN WITH IRON PO     nitroglycerin 0.4 mg SL tablet  Commonly known as:  NITROSTAT  1 Tab by SubLINGual route every five (5) minutes as needed for Chest Pain. Up to 3 doses. traZODone 50 mg tablet  Commonly known as:  DESYREL  TAKE 1 TABLET BY MOUTH NIGHTLY. GENERIC FOR DESYREL          _______________________________       Scribe Cher Kayser acting as a scribe for and in the presence of Megan Durbin MD      December 12, 2018 at 2:04 AM       Provider Attestation:      I personally performed the services described in the documentation, reviewed the documentation, as recorded by the scribe in my presence, and it accurately and completely records my words and actions.  December 12, 2018 at 2:04 AM - Megan Durbin MD        _______________________________

## 2018-12-12 NOTE — CONSULTS
[facility] - Consult Note    Consultation request by Larraine Cheadle, MD for advice/opinion related to evaluating NSTEMI (non-ST elevated myocardial infarction) [facility])    Date of  Admission: 12/11/2018 11:39 PM   Primary Care Physician:  Claudean Favor, MD     Assessment:     Patient Active Problem List   Diagnosis Code    Vitamin D deficiency E55.9    Hypertension I10    Dense breast/ next year 3D mammogram  R92.2    S/P colonoscopy with polypectomy/ repeat in 3 years around 2019 april  Z98.890    Acute coronary syndrome ([facility] I24.9    Atypical chest pain R07.89    NSTEMI (non-ST elevated myocardial infarction) ([facility] I21.4    Coronary artery disease involving native coronary artery I25.10    Accelerated hypertension I10       -NSTEMI with troponin up to 7.66 this AM, initial ECG without new ischemic cheanges, presented with CP. Recent NSTEMI with peak troponin 13.4 with nonobstructive CAD by cath as noted below.  -Nonobstructive CAD by cath (LM normal, LAD minimal luminal irregularities, left Cx normal, RCA normal) by Dr. Javier Comer 12/7/2018. Possible transient thrombus vs vasospasm.  -Hypertension with elevated BP on admission.  -Upper respiratory infection, recent cough and congestion with low grade fever 99.5 without leukocytosis. -Normal LV function 56-60% by echo 12/7/2018 without WMA 12/7/2018.  -Mild to moderate tricuspid valve regurgitation with mild pulmonary hypertension PASP 43mmHg by echo 12/7/2018. Plan: Will check follow up troponin. Will check limited echocardiogram today. Will check ESR. Will continue lovenox until troponin trending downward. Continued on BB. Imdur has been added. Would start cardizem if BP remains elevated and continued Cp. Will continue ASA and plavix. Will continue statin. Will defer URI symptoms to primary team.  Patient is NPO until above testing reviewed. History of Present Illness:      This is a 46 y.o. female admitted for NSTEMI (non-ST elevated myocardial infarction) ([facility] [address] Patient complains of:  CP. Patient is a 46year old female who had recent NSTEMI but was found to have nonobstructive CAD by cath 12/7/2018. Patient was treated for possible thrombus versus vasospasm. Patient was started on ASA, plavix, statin, BB. Patient reports she felt well when first discharged but has developed some CP last day or two. She also complains of cough and congestion and reports pain worse with deep inspiration and coughing. Patient had elevated troponin of 7 aand was started on lovenox. Patients BP was elevated, patient reports compliance with medical regimen. Pain improved with morphine here.       Cardiac risk factors: hypertension      Review of Symptoms:    Constitutional: negative for fevers and chills  Eyes: negative for visual disturbance  Ears, nose, mouth, throat, and face: negative for nasal congestion  Respiratory: positive for cough or sputum  Cardiovascular: positive for chest pain, negative for dyspnea, palpitations, syncope, lower extremity edema  Gastrointestinal: negative for vomiting and diarrhea  Genitourinary:negative for dysuria  Hematologic/lymphatic: negative for bleeding  Musculoskeletal:negative for muscle weakness  Neurological: negative for dizziness     Past Medical History:     Past Medical History:   Diagnosis Date    Anemia     Hypertension     MI (myocardial infarction) ([facility] [address]          Social History:     Social History     Socioeconomic History    Marital status:      Spouse name: Not on file    Number of children: Not on file    Years of education: Not on file    Highest education level: Not on file   Tobacco Use    Smoking status: Never Smoker    Smokeless tobacco: Never Used   Substance and Sexual Activity    Alcohol use: No     Alcohol/week: 0.0 oz     Frequency: Never    Drug use: No    Sexual activity: Yes     Partners: Male     Birth control/protection: Surgical     Comment: tubal ligation        Family History:     Family History   Problem Relation Age of Onset    Breast Cancer Mother     Hypertension Mother     Diabetes Mother     Heart Failure Mother     Breast Cancer Sister     Breast Cancer Sister         Medications:      Allergies   Allergen Reactions    Norvasc [Amlodipine] Swelling     Ankle swelling         Current Facility-Administered Medications   Medication Dose Route Frequency    enoxaparin (LOVENOX) injection 70 mg  1 mg/kg SubCUTAneous Q12H    atorvastatin (LIPITOR) tablet 40 mg  40 mg Oral QHS    clopidogrel (PLAVIX) tablet 75 mg  75 mg Oral DAILY    lisinopril (PRINIVIL, ZESTRIL) tablet 20 mg  20 mg Oral DAILY    metoprolol tartrate (LOPRESSOR) tablet 25 mg  25 mg Oral Q12H    nitroglycerin (NITROSTAT) tablet 0.4 mg  0.4 mg SubLINGual Q5MIN PRN    acetaminophen (TYLENOL) tablet 650 mg  650 mg Oral Q6H PRN    oxyCODONE-acetaminophen (PERCOCET) 5-325 mg per tablet 1 Tab  1 Tab Oral Q6H PRN    naloxone (NARCAN) injection 0.4 mg  0.4 mg IntraVENous PRN    ondansetron (ZOFRAN) injection 4 mg  4 mg IntraVENous Q6H PRN    docusate sodium (COLACE) capsule 100 mg  100 mg Oral BID PRN    aspirin chewable tablet 81 mg  81 mg Oral DAILY    enalaprilat (VASOTEC) injection 1.25 mg  1.25 mg IntraVENous Q6H PRN    morphine injection 1 mg  1 mg IntraVENous Q4H PRN    isosorbide mononitrate ER (IMDUR) tablet 60 mg  60 mg Oral DAILY    hydrALAZINE (APRESOLINE) 20 mg/mL injection 10 mg  10 mg IntraVENous Q6H PRN         Physical Exam:     Visit Vitals  BP (!) 184/97 (BP 1 Location: Left arm, BP Patient Position: At rest)   Pulse 68   Temp 99 °F (37.2 °C)   Resp 18   Ht 5' 2\" (1.575 m)   Wt 71.8 kg (158 lb 6.4 oz)   SpO2 96%   BMI 28.97 kg/m²     BP Readings from Last 3 Encounters:   12/12/18 (!) 184/97   12/11/18 162/84   12/08/18 170/82     Pulse Readings from Last 3 Encounters:   12/12/18 68   12/11/18 72   12/08/18 69     Wt Readings from Last 3 Encounters:   12/12/18 71.8 kg (158 lb 6.4 oz)   12/10/18 73 kg (161 lb)   12/08/18 73.2 kg (161 lb 6.4 oz)       General:  alert, cooperative, no distress, appears stated age  Neck:  no JVD  Lungs:  clear to auscultation bilaterally  Heart:  regular rate and rhythm, S1, S2 normal, no murmur, click, rub or gallop  Abdomen:  abdomen is soft without significant tenderness, masses, organomegaly or guarding  Extremities:  extremities normal, atraumatic, no cyanosis or edema  Skin: Warm and dry.  no hyperpigmentation, vitiligo, or suspicious lesions  Neuro: alert, oriented x3, affect appropriate  Psych: non focal     Data Review:     Recent Labs     12/12/18  0007 12/10/18  2340   WBC 8.9 10.5   HGB 11.9* 11.7*   HCT 35.7 34.9*    308     Recent Labs     12/12/18  0007 12/10/18  2340    142   K 3.4* 3.4*    104   CO2 25 26   * 115*   BUN 10 10   CREA 0.73 0.79   CA 8.7 8.7   ALB 3.8  --    SGOT 53*  --    ALT 40  --    INR 1.1  --        Results for orders placed or performed during the hospital encounter of 12/11/18   EKG, 12 LEAD, INITIAL   Result Value Ref Range    Ventricular Rate 62 BPM    Atrial Rate 62 BPM    P-R Interval 156 ms    QRS Duration 70 ms    Q-T Interval 396 ms    QTC Calculation (Bezet) 401 ms    Calculated P Axis 56 degrees    Calculated R Axis 23 degrees    Calculated T Axis 25 degrees    Diagnosis       Normal sinus rhythm  Possible Left atrial enlargement  Borderline ECG  When compared with ECG of 10-DEC-2018 23:35,  No significant change was found  Confirmed by Reina An MD, Jan Felipe ([id number]) on 12/12/2018 8:15:25 AM         All Cardiac Markers in the last 24 hours:    Lab Results   Component Value Date/Time     (H) 12/12/2018 12:01 AM    CKMB 9.8 (H) 12/12/2018 12:01 AM    CKND1 2.1 12/12/2018 12:01 AM    TROIQ 7.66 ([place]) 12/12/2018 12:07 AM       Last Lipid:    Lab Results   Component Value Date/Time    Cholesterol, total 155 12/08/2018 03:22 AM    HDL Cholesterol 42 12/08/2018 03:22 AM LDL, calculated 98.6 12/08/2018 03:22 AM    Triglyceride 72 12/08/2018 03:22 AM    CHOL/HDL Ratio 3.7 12/08/2018 03:22 AM       Signed By: MATHEW Ross     December 12, 2018

## 2018-12-12 NOTE — PROGRESS NOTES
Pt transported from [address] [place] [address] room 206 to cardiac cath holding, second IV started 20L hand and pt prepped for procedure. Pt understands and verbalizes consent .

## 2018-12-12 NOTE — ED NOTES
Pt asked if she took aspirin. Pt states she took aspirin 325 mg like they prescribe her at [place] a couple of days ago. Information relay to Dr Gladis Maldonado. Dr. Gladis Maldonado then states not to give aspirin and to only place 1/2 inch of nigttroglicerin.

## 2018-12-12 NOTE — PROGRESS NOTES
[facility]  [facility]    Patient: Lashonda Gomez Age: 46 y.o. : 1965 MR#: [id number] SSN: [id number]  Date: 2018     Subjective:     Pt reports recurrent chest pains. States her pain is centralized, and currently 3/10. No SOB, N/V, or abd pain. Denies any alcohol, or recreational drug use. Assessment/Plan:   1. Recurrent NSTEMI s/p recent heart cath in 18  2. Uncontrolled Hypertension  3. Mild hypokalemia    Plan:  1. Cardiology consulted, on treatment dose Lovenox, on DAPT. Plan for cardiac cath today. 2. Cont BP meds including prn. 3. K+ replaced.  Recheck in the am     Goals of care: full code  Disposition:  []PT/OT ordered   [] Case management referral    Case discussed with:  []Patient  []Family  []Nursing  []Case Management  DVT Prophylaxis:  []Lovenox  []Hep SQ  []SCDs  []Coumadin   []On Heparin gtt    Objective:   VS:   Visit Vitals  BP (!) 184/97 (BP 1 Location: Left arm, BP Patient Position: At rest) Comment: Nurse Thuy Bingham aware and notified   Pulse 68   Temp 99 °F (37.2 °C)   Resp 18   Ht 5' 2\" (1.575 m)   Wt 71.8 kg (158 lb 6.4 oz)   SpO2 96%   BMI 28.97 kg/m²      Tmax/24hrs: Temp (24hrs), Av.3 °F (37.4 °C), Min:99 °F (37.2 °C), Max:99.5 °F (37.5 °C)  No intake or output data in the 24 hours ending 18 0943    General:  Awake, alert, NAD  Cardiovascular:  RRR  Pulmonary: CTA   GI:  NT, normal BS  Extremities:  No edema or cyanosis  Neuro: AAOx3     Labs:    Recent Results (from the past 24 hour(s))   EKG, 12 LEAD, INITIAL    Collection Time: 18 11:57 PM   Result Value Ref Range    Ventricular Rate 62 BPM    Atrial Rate 62 BPM    P-R Interval 156 ms    QRS Duration 70 ms    Q-T Interval 396 ms    QTC Calculation (Bezet) 401 ms    Calculated P Axis 56 degrees    Calculated R Axis 23 degrees    Calculated T Axis 25 degrees    Diagnosis       Normal sinus rhythm  Possible Left atrial enlargement  Borderline ECG  When compared with ECG of 10-DEC-2018 23:35,  No significant change was found  Confirmed by Joesph Malik MD, Stephanie Camposemilia ([id number]) on 12/12/2018 8:15:25 AM     CK-MB,QT    Collection Time: 12/12/18 12:01 AM   Result Value Ref Range    CK - MB 9.8 (H) <3.6 ng/ml    CK-MB Index 2.1 0.0 - 4.0 %   CK    Collection Time: 12/12/18 12:01 AM   Result Value Ref Range     (H) 26 - 192 U/L   TROPONIN I    Collection Time: 12/12/18 12:07 AM   Result Value Ref Range    Troponin-I, Qt. 7.66 (HH) 0.00 - 0.06 NG/ML   CBC WITH AUTOMATED DIFF    Collection Time: 12/12/18 12:07 AM   Result Value Ref Range    WBC 8.9 4.6 - 13.2 K/uL    RBC 4.09 (L) 4.20 - 5.30 M/uL    HGB 11.9 (L) 12.0 - 16.0 g/dL    HCT 35.7 35.0 - 45.0 %    MCV 87.3 74.0 - 97.0 FL    MCH 29.1 24.0 - 34.0 PG    MCHC 33.3 31.0 - 37.0 g/dL    RDW 13.2 11.6 - 14.5 %    PLATELET 410 777 - 164 K/uL    MPV 12.0 (H) 9.2 - 11.8 FL    NEUTROPHILS 49 40 - 73 %    LYMPHOCYTES 28 21 - 52 %    MONOCYTES 14 (H) 3 - 10 %    EOSINOPHILS 9 (H) 0 - 5 %    BASOPHILS 0 0 - 2 %    ABS. NEUTROPHILS 4.3 1.8 - 8.0 K/UL    ABS. LYMPHOCYTES 2.5 0.9 - 3.6 K/UL    ABS. MONOCYTES 1.3 (H) 0.05 - 1.2 K/UL    ABS. EOSINOPHILS 0.8 (H) 0.0 - 0.4 K/UL    ABS.  BASOPHILS 0.0 0.0 - 0.1 K/UL    DF AUTOMATED     PROTHROMBIN TIME + INR    Collection Time: 12/12/18 12:07 AM   Result Value Ref Range    Prothrombin time 14.1 11.5 - 15.2 sec    INR 1.1 0.8 - 1.2     METABOLIC PANEL, COMPREHENSIVE    Collection Time: 12/12/18 12:07 AM   Result Value Ref Range    Sodium 142 136 - 145 mmol/L    Potassium 3.4 (L) 3.5 - 5.5 mmol/L    Chloride 104 100 - 108 mmol/L    CO2 25 21 - 32 mmol/L    Anion gap 13 3.0 - 18 mmol/L    Glucose 113 (H) 74 - 99 mg/dL    BUN 10 7.0 - 18 MG/DL    Creatinine 0.73 0.6 - 1.3 MG/DL    BUN/Creatinine ratio 14 12 - 20      GFR est AA >60 >60 ml/min/1.73m2    GFR est non-AA >60 >60 ml/min/1.73m2    Calcium 8.7 8.5 - 10.1 MG/DL    Bilirubin, total 0.2 0.2 - 1.0 MG/DL    ALT (SGPT) 40 13 - 56 U/L    AST (SGOT) 53 (H) 15 - 37 U/L    Alk.  phosphatase 103 45 - 117 U/L    Protein, total 7.1 6.4 - 8.2 g/dL    Albumin 3.8 3.4 - 5.0 g/dL    Globulin 3.3 2.0 - 4.0 g/dL    A-G Ratio 1.2 0.8 - 1.7     STREP THROAT SCREEN    Collection Time: 12/12/18 12:15 AM   Result Value Ref Range    Special Requests: NO SPECIAL REQUESTS      Strep Screen NEGATIVE       Culture result: PENDING    INFLUENZA A & B AG (RAPID TEST)    Collection Time: 12/12/18 12:19 AM   Result Value Ref Range    Influenza A Antigen NEGATIVE  NEG      Influenza B Antigen NEGATIVE  NEG         Signed By: Antonietta Salazar PA-C     December 12, 2018 9:43 AM

## 2018-12-12 NOTE — H&P
History & Physical    Patient: Flora Hunter MRN: [id number]  CSN: [id number]    YOB: 1965  Age: 46 y.o. Sex: female      DOA: 12/11/2018    Chief Complaint:   Chief Complaint   Patient presents with    Chest Pain          HPI:     Flora Hunter is a 46 y.o.  female who has    Past Medical History:   Diagnosis Date    Anemia     Hypertension     MI (myocardial infarction) ([facility] [address]        Past Surgical History:   Procedure Laterality Date    HX APPENDECTOMY      HX HEART CATHETERIZATION         Family History   Problem Relation Age of Onset    Breast Cancer Mother     Hypertension Mother     Diabetes Mother     Heart Failure Mother     Breast Cancer Sister     Breast Cancer Sister        Social History     Socioeconomic History    Marital status:      Spouse name: Not on file    Number of children: Not on file    Years of education: Not on file    Highest education level: Not on file   Tobacco Use    Smoking status: Never Smoker    Smokeless tobacco: Never Used   Substance and Sexual Activity    Alcohol use: No     Alcohol/week: 0.0 oz     Frequency: Never    Drug use: No    Sexual activity: Yes     Partners: Male     Birth control/protection: Surgical     Comment: tubal ligation       Prior to Admission medications    Medication Sig Start Date End Date Taking? Authorizing Provider   clopidogrel (PLAVIX) 75 mg tab Take 1 Tab by mouth daily. Take 1 Tab by mouth daily for 1 month. 12/9/18   Kim Dueñas MD   aspirin (ASPIRIN) 325 mg tablet Take 1 Tab by mouth daily. 12/9/18   Kim Dueñas MD   lisinopril (PRINIVIL, ZESTRIL) 20 mg tablet Take 1 Tab by mouth daily. 12/7/18   Kim Dueñas MD   atorvastatin (LIPITOR) 40 mg tablet Take 1 Tab by mouth nightly. 12/7/18   Kim Dueñas MD   metoprolol tartrate (LOPRESSOR) 25 mg tablet Take 1 Tab by mouth every twelve (12) hours.  12/7/18   Kim Dueñas MD   nitroglycerin (NITROSTAT) 0.4 mg SL tablet 1 Tab by SubLINGual route every five (5) minutes as needed for Chest Pain. Up to 3 doses. 12/7/18   Nicole Bautista MD   traZODone (DESYREL) 50 mg tablet TAKE 1 TABLET BY MOUTH NIGHTLY. GENERIC FOR DESYREL 9/4/18   Chris Ospina MD   cyclobenzaprine (FLEXERIL) 5 mg tablet Take 1 Tab by mouth nightly. 11/22/17   Hesham Gonzales PA   diphenhydrAMINE (BENADRYL) 25 mg capsule Take 25 mg by mouth as needed. Provider, Historical   MULTIVITAMIN WITH IRON PO Take 1 Tab by mouth daily. Provider, Historical   FERROUS FUMARATE (IRON PO) Take 325 mg by mouth two (2) times a day. Provider, Historical   Cholecalciferol, Vitamin D3, 1,000 unit cap Take 1,000 Units by mouth two (2) times a day. Provider, Historical       Allergies   Allergen Reactions    Norvasc [Amlodipine] Swelling     Ankle swelling          Review of Systems  GENERAL: Patient alert, awake and oriented times 3, able to communicate full sentences and not in distress. HEENT: No change in vision, no earache, tinnitus, sore throat or sinus congestion. NECK: No pain or stiffness. PULMONARY: No shortness of breath, cough or wheeze. Cardiovascular: no pnd / orthopnea, no CP  GASTROINTESTINAL: No abdominal pain, nausea, vomiting or diarrhea, melena or bright red blood per rectum. GENITOURINARY: No urinary frequency, urgency, hesitancy or dysuria. MUSCULOSKELETAL: No joint or muscle pain, no back pain, no recent trauma. DERMATOLOGIC: No rash, no itching, no lesions. ENDOCRINE: No polyuria, polydipsia, no heat or cold intolerance. No recent change in weight. HEMATOLOGICAL: No anemia or easy bruising or bleeding. NEUROLOGIC: No headache, seizures, numbness, tingling or weakness.        Physical Exam:     Physical Exam:  Visit Vitals  BP (!) 184/97 (BP 1 Location: Left arm, BP Patient Position: At rest) Comment: Nurse Kiana Hilliard aware and notified   Pulse 68   Temp 99 °F (37.2 °C)   Resp 18   Ht 5' 2\" (1.575 m)   Wt 71.8 kg (158 lb 6.4 oz)   SpO2 96% BMI 28.97 kg/m²      O2 Device: Room air    Temp (24hrs), Av.3 °F (37.4 °C), Min:99 °F (37.2 °C), Max:99.5 °F (37.5 °C)    No intake/output data recorded. No intake/output data recorded. General:  Alert, cooperative, no distress, appears stated age. Head: Normocephalic, without obvious abnormality, atraumatic. Eyes:  Conjunctivae/corneas clear. PERRL, EOMs intact. Nose: Nares normal. No drainage or sinus tenderness. Neck: Supple, symmetrical, trachea midline, no adenopathy, thyroid: no enlargement, no carotid bruit and no JVD. Lungs:   Clear to auscultation bilaterally. Heart:  Regular rate and rhythm, S1, S2 normal.     Abdomen: Soft, non-tender. Bowel sounds normal.    Extremities: Extremities normal, atraumatic, no cyanosis or edema. Pulses: 2+ and symmetric all extremities. Skin:  No rashes or lesions   Neurologic: AAOx3, No focal motor or sensory deficit. Labs Reviewed:    Lab results reviewed. For significant abnormal values and values requiring intervention, see assessment and plan.   EKG    Procedures/imaging: see electronic medical records for all procedures/Xrays and details which were not copied into this note but were reviewed prior to creation of Plan      Assessment/Plan     Principal Problem:    NSTEMI (non-ST elevated myocardial infarction) ([facility] [address] [address] (2018)    Active Problems:    Coronary artery disease involving native coronary artery (2018)      Accelerated hypertension (2018)      Pt is admitted fo NSTEMI with hx of non-obstructive CAD  Accelerated HTN     Started Lovenox Therapeutic   On ASA, Plavix, BB and ACE-I  Cardiology consult is called   Will add Imdur for anginal pain  Pt develops edema to on amlodipine      DVT/GI Prophylaxis: Lovenox    Plan of care is discussed in details with Patient/Family at bedside and agreed upon    Timo Muller MD  2018 6:47 AM

## 2018-12-12 NOTE — ED NOTES
Pt resting comfortably, denies pain at present. Denies any other needs at present, comfort measures taken.

## 2018-12-12 NOTE — PROGRESS NOTES
Reason for Readmission:      NSTEMI (non-ST elevated myocardial infarction) ([facility] [address]         RRAT Score and Risk Level:     11 RRAT, low    Readmit     Level of Readmission:    Level 1, 1 readmission in last 30 days      Care Conference scheduled:   Not at this time. Resources/supports as identified by patient/family:    Pt's daughters are supports for pt, live in the area, provide transport. Top Challenges facing patient (as identified by patient/family and CM): None identified. Finances/Medication cost?       Transportation        Support system or lack thereof? Living arrangements? Self-care/ADLs/Cognition? Current Advanced Directive/Advance Care Plan:   Not on file           Plan for utilizing home health:   Not at this time, no PT/OT referrals or orders currently. Likelihood of additional readmission:                Transition of Care Plan:    Based on readmission, the patient's previous Plan of Care   has been evaluated and/or modified. The current Transition of Care Plan is:                Initial assessment completed with patient. Face sheet information confirmed:  yes. The patient requests we communicate with patient in reference to medical care. This patient lives in a single family home with patient. Patient is able to navigate steps as needed. Prior to hospitalization, patient was considered to be independent : yes . Cognitive status of patient: oriented to time, place, person and situation. Patient has a current ACP document on file: no  The patient and daughter will be available to transport patient home upon discharge. No DME in the home. Patient is not currently active with home health. Patient has not ever stayed in a skilled nursing facility or rehab. This patient is on dialysis :no    Currently, the discharge plan is Home.       Care Management Interventions  PCP Verified by CM: Yes(last saw in september, have follow up appt tomorrow)  Palliative Care Criteria Met (RRAT>21 & CHF Dx)?: No  Mode of Transport at Discharge: Self  Transition of Care Consult (CM Consult): Discharge Planning  MyChart Signup: No  Discharge Durable Medical Equipment: No  Physical Therapy Consult: No  Occupational Therapy Consult: No  Speech Therapy Consult: No  Current Support Network: Family Lives Nearby  Confirm Follow Up Transport: Family  Plan discussed with Pt/Family/Caregiver: Yes  [place] Resource Information Provided?: No  Discharge Location  Discharge Placement: [address] [place] [address] [facility]  Case Management  [phone]

## 2018-12-12 NOTE — ROUTINE PROCESS
Bedside and Verbal shift change report given to CLEM Cantor (oncoming nurse) by Zen Castro   (offgoing nurse). Report included the following information SBAR, ED Summary, Intake/Output, MAR, Recent Results and Cardiac Rhythm Sinus Rhythm.

## 2018-12-12 NOTE — ED TRIAGE NOTES
Reports chest pain when she coughs, states she took a NTG last night and swallowed it and since then her chest hasn't felt right.

## 2018-12-13 LAB
ANION GAP SERPL CALC-SCNC: 8 MMOL/L (ref 3–18)
ATRIAL RATE: 69 BPM
BASOPHILS # BLD: 0 K/UL (ref 0–0.1)
BASOPHILS NFR BLD: 0 % (ref 0–2)
BUN SERPL-MCNC: 14 MG/DL (ref 7–18)
BUN/CREAT SERPL: 25 (ref 12–20)
CALCIUM SERPL-MCNC: 8.2 MG/DL (ref 8.5–10.1)
CALCULATED P AXIS, ECG09: 68 DEGREES
CALCULATED R AXIS, ECG10: 22 DEGREES
CALCULATED T AXIS, ECG11: -35 DEGREES
CHLORIDE SERPL-SCNC: 105 MMOL/L (ref 100–108)
CK MB CFR SERPL CALC: 1.5 % (ref 0–4)
CK MB SERPL-MCNC: 6.2 NG/ML (ref 5–25)
CK SERPL-CCNC: 412 U/L (ref 26–192)
CO2 SERPL-SCNC: 24 MMOL/L (ref 21–32)
CREAT SERPL-MCNC: 0.56 MG/DL (ref 0.6–1.3)
DIAGNOSIS, 93000: NORMAL
DIFFERENTIAL METHOD BLD: ABNORMAL
EOSINOPHIL # BLD: 0.6 K/UL (ref 0–0.4)
EOSINOPHIL NFR BLD: 6 % (ref 0–5)
ERYTHROCYTE [DISTWIDTH] IN BLOOD BY AUTOMATED COUNT: 13.4 % (ref 11.6–14.5)
GLUCOSE SERPL-MCNC: 91 MG/DL (ref 74–99)
HCT VFR BLD AUTO: 31.7 % (ref 35–45)
HGB BLD-MCNC: 10.9 G/DL (ref 12–16)
LYMPHOCYTES # BLD: 2.6 K/UL (ref 0.9–3.6)
LYMPHOCYTES NFR BLD: 28 % (ref 21–52)
MAGNESIUM SERPL-MCNC: 2.2 MG/DL (ref 1.6–2.6)
MCH RBC QN AUTO: 29.1 PG (ref 24–34)
MCHC RBC AUTO-ENTMCNC: 34.4 G/DL (ref 31–37)
MCV RBC AUTO: 84.8 FL (ref 74–97)
MONOCYTES # BLD: 1.3 K/UL (ref 0.05–1.2)
MONOCYTES NFR BLD: 14 % (ref 3–10)
NEUTS SEG # BLD: 4.8 K/UL (ref 1.8–8)
NEUTS SEG NFR BLD: 52 % (ref 40–73)
P-R INTERVAL, ECG05: 154 MS
PHOSPHATE SERPL-MCNC: 3.2 MG/DL (ref 2.5–4.9)
PLATELET # BLD AUTO: 282 K/UL (ref 135–420)
PMV BLD AUTO: 11.5 FL (ref 9.2–11.8)
POTASSIUM SERPL-SCNC: 3.5 MMOL/L (ref 3.5–5.5)
Q-T INTERVAL, ECG07: 382 MS
QRS DURATION, ECG06: 66 MS
QTC CALCULATION (BEZET), ECG08: 409 MS
RBC # BLD AUTO: 3.74 M/UL (ref 4.2–5.3)
SODIUM SERPL-SCNC: 137 MMOL/L (ref 136–145)
TROPONIN I SERPL-MCNC: 14.5 NG/ML (ref 0–0.04)
VENTRICULAR RATE, ECG03: 69 BPM
WBC # BLD AUTO: 9.4 K/UL (ref 4.6–13.2)

## 2018-12-13 PROCEDURE — 74011250637 HC RX REV CODE- 250/637: Performed by: PHYSICIAN ASSISTANT

## 2018-12-13 PROCEDURE — 82553 CREATINE MB FRACTION: CPT

## 2018-12-13 PROCEDURE — 83735 ASSAY OF MAGNESIUM: CPT

## 2018-12-13 PROCEDURE — 84100 ASSAY OF PHOSPHORUS: CPT

## 2018-12-13 PROCEDURE — 74011250636 HC RX REV CODE- 250/636: Performed by: INTERNAL MEDICINE

## 2018-12-13 PROCEDURE — 85025 COMPLETE CBC W/AUTO DIFF WBC: CPT

## 2018-12-13 PROCEDURE — 36415 COLL VENOUS BLD VENIPUNCTURE: CPT

## 2018-12-13 PROCEDURE — 74011250637 HC RX REV CODE- 250/637: Performed by: INTERNAL MEDICINE

## 2018-12-13 PROCEDURE — 65660000000 HC RM CCU STEPDOWN

## 2018-12-13 PROCEDURE — 93005 ELECTROCARDIOGRAM TRACING: CPT

## 2018-12-13 PROCEDURE — 80048 BASIC METABOLIC PNL TOTAL CA: CPT

## 2018-12-13 RX ORDER — ONDANSETRON 4 MG/1
4 TABLET, FILM COATED ORAL
Qty: 20 TAB | Refills: 0 | Status: SHIPPED | OUTPATIENT
Start: 2018-12-13 | End: 2018-12-20

## 2018-12-13 RX ORDER — ISOSORBIDE MONONITRATE 60 MG/1
60 TABLET, EXTENDED RELEASE ORAL DAILY
Qty: 30 TAB | Refills: 0 | Status: SHIPPED | OUTPATIENT
Start: 2018-12-14 | End: 2018-12-14

## 2018-12-13 RX ORDER — METOPROLOL TARTRATE 50 MG/1
50 TABLET ORAL EVERY 12 HOURS
Status: DISCONTINUED | OUTPATIENT
Start: 2018-12-13 | End: 2018-12-13

## 2018-12-13 RX ORDER — GUAIFENESIN 100 MG/5ML
81 LIQUID (ML) ORAL DAILY
Qty: 30 TAB | Refills: 0 | Status: SHIPPED | OUTPATIENT
Start: 2018-12-14 | End: 2019-01-16 | Stop reason: SDUPTHER

## 2018-12-13 RX ORDER — METOPROLOL TARTRATE 50 MG/1
50 TABLET ORAL EVERY 12 HOURS
Qty: 60 TAB | Refills: 0 | Status: SHIPPED | OUTPATIENT
Start: 2018-12-13 | End: 2018-12-20 | Stop reason: SDUPTHER

## 2018-12-13 RX ORDER — METOPROLOL TARTRATE 50 MG/1
50 TABLET ORAL EVERY 12 HOURS
Status: DISCONTINUED | OUTPATIENT
Start: 2018-12-13 | End: 2018-12-14 | Stop reason: HOSPADM

## 2018-12-13 RX ORDER — NIFEDIPINE 30 MG/1
30 TABLET, EXTENDED RELEASE ORAL DAILY
Status: DISCONTINUED | OUTPATIENT
Start: 2018-12-14 | End: 2018-12-14 | Stop reason: HOSPADM

## 2018-12-13 RX ADMIN — Medication 10 ML: at 15:52

## 2018-12-13 RX ADMIN — CLOPIDOGREL BISULFATE 75 MG: 75 TABLET ORAL at 10:14

## 2018-12-13 RX ADMIN — ACETAMINOPHEN 650 MG: 325 TABLET ORAL at 01:39

## 2018-12-13 RX ADMIN — OXYCODONE AND ACETAMINOPHEN 1 TABLET: 5; 325 TABLET ORAL at 22:47

## 2018-12-13 RX ADMIN — METOPROLOL TARTRATE 50 MG: 50 TABLET ORAL at 22:26

## 2018-12-13 RX ADMIN — ENOXAPARIN SODIUM 70 MG: 80 INJECTION SUBCUTANEOUS at 06:07

## 2018-12-13 RX ADMIN — ATORVASTATIN CALCIUM 40 MG: 40 TABLET, FILM COATED ORAL at 22:26

## 2018-12-13 RX ADMIN — ACETAMINOPHEN 650 MG: 325 TABLET ORAL at 11:22

## 2018-12-13 RX ADMIN — ONDANSETRON 4 MG: 2 INJECTION INTRAMUSCULAR; INTRAVENOUS at 12:17

## 2018-12-13 RX ADMIN — OXYCODONE AND ACETAMINOPHEN 1 TABLET: 5; 325 TABLET ORAL at 07:47

## 2018-12-13 RX ADMIN — Medication 10 ML: at 06:07

## 2018-12-13 RX ADMIN — Medication 10 ML: at 22:31

## 2018-12-13 RX ADMIN — ENOXAPARIN SODIUM 70 MG: 80 INJECTION SUBCUTANEOUS at 19:20

## 2018-12-13 RX ADMIN — LISINOPRIL 20 MG: 20 TABLET ORAL at 10:14

## 2018-12-13 RX ADMIN — ISOSORBIDE MONONITRATE 60 MG: 60 TABLET, EXTENDED RELEASE ORAL at 10:14

## 2018-12-13 RX ADMIN — ASPIRIN 81 MG 81 MG: 81 TABLET ORAL at 10:14

## 2018-12-13 RX ADMIN — OXYCODONE AND ACETAMINOPHEN 1 TABLET: 5; 325 TABLET ORAL at 15:51

## 2018-12-13 RX ADMIN — MORPHINE SULFATE 1 MG: 2 INJECTION, SOLUTION INTRAMUSCULAR; INTRAVENOUS at 04:32

## 2018-12-13 RX ADMIN — METOPROLOL TARTRATE 50 MG: 50 TABLET ORAL at 10:14

## 2018-12-13 NOTE — PROGRESS NOTES
[facility] - Progress Note  Admit Date: 12/11/2018    Assessment:     Hospital Problems  Date Reviewed: 12/12/2018          Codes Class Noted POA    * (Principal) NSTEMI (non-ST elevated myocardial infarction) ([facility] [address] ICD-10-CM: I21.4  ICD-9-CM: 410.70  12/12/2018 Unknown        Coronary artery disease involving native coronary artery ICD-10-CM: I25.10  ICD-9-CM: 414.01  12/12/2018 Unknown        Accelerated hypertension ICD-10-CM: I10  ICD-9-CM: 401.0  12/12/2018 Unknown                -NSTEMI with troponin up to 14, initial ECG without new ischemic changes, presented with CP. Recent NSTEMI with peak troponin 13.4 with nonobstructive CAD by cath as noted below. Cath this admission with with Occluded mid/distal OM 2. Small vessel which is quite tortuous. Otherwise, no significant other CAD noted currently. Incidental finding of right iliac artery evidence of FMD, fibromuscular dysplasia  -Nonobstructive CAD by cath (LM normal, LAD minimal luminal irregularities, left Cx normal, RCA normal) by Dr. Rose Mary Prescott 12/7/2018. Possible transient thrombus vs vasospasm.  -Hypertension with elevated BP on admission.  -Upper respiratory infection, recent cough and congestion with low grade fever 99.5 without leukocytosis. -Normal LV function 56-60% by echo 12/7/2018 without WMA. -Mild to moderate tricuspid valve regurgitation with mild pulmonary hypertension PASP 43mmHg by echo 12/7/2018. Plan:       Repeat cath with Occluded mid/distal OM 2. Small vessel which is quite tortuous. Otherwise, no significant other CAD noted currently. Incidental finding of right iliac artery evidence of FMD, fibromuscular dysplasia. Continue medical therapy. Continue ASA, plavix,  Continue BB (increased) follow HR. Continue imdur (started this admission)  Follow up troponin pending, possible discharge later today but patient feeling nauseated and complaining of headache.   No further CP but just staying in bed as she does not feel well. Increase activity as tolerates. Subjective:     No CP.  But headache and nausea    Objective:      Patient Vitals for the past 8 hrs:   Temp Pulse Resp BP SpO2   12/13/18 0727 98.8 °F (37.1 °C) 68 20 155/72 95 %         Patient Vitals for the past 96 hrs:   Weight   12/13/18 0425 72 kg (158 lb 12.8 oz)   12/12/18 1317 71.7 kg (158 lb)   12/12/18 0641 71.8 kg (158 lb 6.4 oz)                  No intake or output data in the 24 hours ending 12/13/18 1244    Physical Exam:  General:  alert, cooperative, no distress, appears stated age  Neck:  no JVD  Lungs:  clear to auscultation bilaterally  Heart:  regular rate and rhythm  Abdomen:  abdomen is soft without significant tenderness, masses, organomegaly or guarding  Extremities:  extremities normal, atraumatic, no cyanosis or edema    Data Review:     Labs: Results:       Chemistry Recent Labs     12/13/18 0312 12/12/18 0007 12/10/18  2340   GLU 91 113* 115*    142 142   K 3.5 3.4* 3.4*    104 104   CO2 24 25 26   BUN 14 10 10   CREA 0.56* 0.73 0.79   CA 8.2* 8.7 8.7   MG 2.2  --   --    PHOS 3.2  --   --    AGAP 8 13 12   BUCR 25* 14 13   AP  --  103  --    TP  --  7.1  --    ALB  --  3.8  --    GLOB  --  3.3  --    AGRAT  --  1.2  --       CBC w/Diff Recent Labs     12/13/18 0312 12/12/18  0007 12/10/18  2340   WBC 9.4 8.9 10.5   RBC 3.74* 4.09* 3.99*   HGB 10.9* 11.9* 11.7*   HCT 31.7* 35.7 34.9*    290 308   GRANS 52 49 53   LYMPH 28 28 28   EOS 6* 9* 8*      Cardiac Enzymes Lab Results   Component Value Date/Time     (H) 12/12/2018 08:37 PM    CKMB 15.8 (H) 12/12/2018 08:37 PM    CKND1 2.7 12/12/2018 08:37 PM    TROIQ 14.70 ([facility]) 12/12/2018 08:37 PM      Coagulation Recent Labs     12/12/18  0007   PTP 14.1   INR 1.1       Lipid Panel Lab Results   Component Value Date/Time    Cholesterol, total 155 12/08/2018 03:22 AM    HDL Cholesterol 42 12/08/2018 03:22 AM    LDL, calculated 98.6 12/08/2018 03:22 AM    VLDL, calculated 14.4 12/08/2018 03:22 AM    Triglyceride 72 12/08/2018 03:22 AM    CHOL/HDL Ratio 3.7 12/08/2018 03:22 AM      BNP No results found for: BNP, BNPP, XBNPT   Liver Enzymes Recent Labs     12/12/18  0007   TP 7.1   ALB 3.8      SGOT 53*      Digoxin    Thyroid Studies Lab Results   Component Value Date/Time    TSH 1.040 06/09/2014 12:00 AM          Signed By: MATHEW Rosado     December 13, 2018

## 2018-12-13 NOTE — PROGRESS NOTES
Anuradha Suggs Trusty    Bedside and Verbal shift change report given to Mark Helton RN (oncoming nurse) by Drake Wu RN (offgoing nurse). Report included the following information SBAR, Kardex, MAR and Recent Results. Patient alert; resting quietly, Cardiac Catheterization dressing intact. No complaints of pain. Vital signs are stable.  Sinu Rhythm on telebox 56

## 2018-12-13 NOTE — PROGRESS NOTES
D/c noted for today. No d/c needs. Pt's  will transport home.      Nara Burgess MSW  Case Management  [phone]

## 2018-12-13 NOTE — ROUTINE PROCESS
Bedside and Verbal shift change report given to SYLVIA Patten (oncoming nurse) by Ben Sewell   (offgoing nurse). Report included the following information SBAR, ED Summary, Intake/Output, MAR, Recent Results and Cardiac Rhythm Sinus Rhythm.

## 2018-12-13 NOTE — DISCHARGE SUMMARY
[facility]  Discharge Summary       Patient: Cara Hinkle Age: 46 y.o. : 1965 MR#: [id number] SSN: [id number]  PCP on record: Janna Mojica MD  Admit date: 2018  Discharge date: 2018    Disposition:    []Home   []Home with Home Health   []SNF/NH   []Rehab   [x]Home with family   []Alternate Facility:____________________    Admission Diagnoses:  NSTEMI (non-ST elevated myocardial infarction) [facility])    Discharge Diagnoses:                             1. Recurrent NSTEMI s/p recent heart cath in 18  2. Uncontrolled Hypertension  3. Mild hypokalemia  4. Headache ? Imdur related    Discharge Medications:     Current Discharge Medication List      START taking these medications    Details   NIFEdipine ER (PROCARDIA XL) 30 mg ER tablet Take 1 Tab by mouth daily. Qty: 30 Tab, Refills: 0      aspirin 81 mg chewable tablet Take 1 Tab by mouth daily. Qty: 30 Tab, Refills: 0      ondansetron hcl (ZOFRAN) 4 mg tablet Take 1 Tab by mouth every eight (8) hours as needed for Nausea. Qty: 20 Tab, Refills: 0         CONTINUE these medications which have CHANGED    Details   metoprolol tartrate (LOPRESSOR) 50 mg tablet Take 1 Tab by mouth every twelve (12) hours. Qty: 60 Tab, Refills: 0         CONTINUE these medications which have NOT CHANGED    Details   clopidogrel (PLAVIX) 75 mg tab Take 1 Tab by mouth daily. Take 1 Tab by mouth daily for 1 month. Qty: 30 Tab, Refills: 0      lisinopril (PRINIVIL, ZESTRIL) 20 mg tablet Take 1 Tab by mouth daily. Qty: 30 Tab, Refills: 1    Associated Diagnoses: Essential hypertension      atorvastatin (LIPITOR) 40 mg tablet Take 1 Tab by mouth nightly. Qty: 30 Tab, Refills: 1      nitroglycerin (NITROSTAT) 0.4 mg SL tablet 1 Tab by SubLINGual route every five (5) minutes as needed for Chest Pain. Up to 3 doses. Qty: 1 Bottle, Refills: 1      traZODone (DESYREL) 50 mg tablet TAKE 1 TABLET BY MOUTH NIGHTLY.  GENERIC FOR DESYREL  Qty: 90 Tab, Refills: 0    Associated Diagnoses: Sleep trouble      cyclobenzaprine (FLEXERIL) 5 mg tablet Take 1 Tab by mouth nightly. Qty: 7 Tab, Refills: 0      MULTIVITAMIN WITH IRON PO Take 1 Tab by mouth daily. FERROUS FUMARATE (IRON PO) Take 325 mg by mouth two (2) times a day. Associated Diagnoses: Fatigue      Cholecalciferol, Vitamin D3, 1,000 unit cap Take 1,000 Units by mouth two (2) times a day. Associated Diagnoses: Fatigue         STOP taking these medications       aspirin (ASPIRIN) 325 mg tablet Comments:   Reason for Stopping:         diphenhydrAMINE (BENADRYL) 25 mg capsule Comments:   Reason for Stopping:             Consults:  Cardiology   -   Procedures: Left heart cath  -     Significant Diagnostic Studies:   Procedures     CORONARY ANGIOGRAPHY      Pre-procedure Diagnosis     NSTEMI (non-ST elevated myocardial infarction) ([facility] [address] [I21.4]           Indications     NSTEMI (non-ST elevated myocardial infarction) ([facility] [address] [I21.4 (ICD-10-CM)]   Conclusion     · Occluded mid/distal OM 2. Small vessel which is quite tortuous. · Otherwise, no significant other CAD noted currently. · Continue medical therapy with aspirin, Plavix, beta-blockers. · Incidental finding of right iliac artery evidence of FMD, fibromuscular dysplasia          Echocardiogram follow up 12/12/18  Interpretation Summary     · Estimated left ventricular ejection fraction is 56 - 60%. Visually measured ejection fraction. Left ventricular mild concentric hypertrophy. Normal left ventricular wall motion, no regional wall motion abnormality noted. CXR;  FINDINGS: Single frontal view of the chest obtained. Underpenetration limits  evaluation. No consolidation. Mediastinal silhouette and pulmonary vasculature  unremarkable. No evidence of pneumothorax. No acute osseous findings.     IMPRESSION  IMPRESSION:     No acute findings. CORONARY ANGIOGRAPHY  Conclusion     · Occluded mid/distal OM 2.  Small vessel which is quite tortuous. · Otherwise, no significant other CAD noted currently. · Continue medical therapy with aspirin, Plavix, beta-blockers. · Incidental finding of right iliac artery evidence of FMD, fibromuscular dysplasia       Hospital Course by Problem   1. Recurrent NSTEMI s/p recent heart cath in 12/7/18: Pt admitted d/t chest pain and elevated troponin - NSTEMI, pt was managed with treatment dose Lovenox, cardiology followed pt. They did a heart cath noted a occulded mid/distal OM2 as above. No stents were placed. Cardiology recommended medical therapy. 2. Uncontrolled Hypertension: managed with DAPT, beta-blocker and CCB. 3. Mild hypokalemia: replaced and resolved while inpt. 4. Headache ? Imdur related: pt had frontal HA the night Imdur was started. MORAN continued the next day. Per discussion with cardiology, Imdur was stopped, and HA resolved. Tolerated alternative - Nifedipine without issues. Today's examination of the patient revealed:     Subjective:     Pt reports doing well. No chest pain, SOB, abd pain, N/V. Objective:   VS:   Visit Vitals  /82 (BP 1 Location: Right arm, BP Patient Position: At rest)   Pulse (!) 56   Temp 98.1 °F (36.7 °C)   Resp 16   Ht 5' 2\" (1.575 m)   Wt 71.6 kg (157 lb 12.8 oz)   SpO2 96%   Breastfeeding? No   BMI 28.86 kg/m²       General:  Alert, NAD  Cardiovascular:  RRR  Pulmonary:  LSC throughout; respiratory effort WNL  GI:  +BS in all four quadrants, soft, non-tender  Extremities:  No edema.   Additional:      Labs:    Recent Results (from the past 24 hour(s))   SED RATE (ESR)    Collection Time: 12/12/18 12:30 PM   Result Value Ref Range    Sed rate, automated 29 0 - 30 mm/hr   ECHO ADULT FOLLOW-UP OR LIMITED    Collection Time: 12/12/18  1:35 PM   Result Value Ref Range    LVIDd 3.79 (A) 3.9 - 5.3 cm    LVPWd 1.24 (A) 0.6 - 0.9 cm    LVIDs 2.38 cm    IVSd 1.22 (A) 0.6 - 0.9 cm    LV Mass .3 (A) 67 - 162 g    LV Mass AL Index 106.0 43 - 95 g/m2 Triscuspid Valve Regurgitation Peak Gradient 31.6 mmHg    TR Max Velocity 281.22 cm/s   CARDIAC PANEL,(CK, CKMB & TROPONIN)    Collection Time: 12/12/18  8:37 PM   Result Value Ref Range     (H) 26 - 192 U/L    CK - MB 15.8 (H) <3.6 ng/ml    CK-MB Index 2.7 0.0 - 4.0 %    Troponin-I, Qt. 14.70 (HH) 0.0 - 2.205 NG/ML   METABOLIC PANEL, BASIC    Collection Time: 12/13/18  3:12 AM   Result Value Ref Range    Sodium 137 136 - 145 mmol/L    Potassium 3.5 3.5 - 5.5 mmol/L    Chloride 105 100 - 108 mmol/L    CO2 24 21 - 32 mmol/L    Anion gap 8 3.0 - 18 mmol/L    Glucose 91 74 - 99 mg/dL    BUN 14 7.0 - 18 MG/DL    Creatinine 0.56 (L) 0.6 - 1.3 MG/DL    BUN/Creatinine ratio 25 (H) 12 - 20      GFR est AA >60 >60 ml/min/1.73m2    GFR est non-AA >60 >60 ml/min/1.73m2    Calcium 8.2 (L) 8.5 - 10.1 MG/DL   MAGNESIUM    Collection Time: 12/13/18  3:12 AM   Result Value Ref Range    Magnesium 2.2 1.6 - 2.6 mg/dL   PHOSPHORUS    Collection Time: 12/13/18  3:12 AM   Result Value Ref Range    Phosphorus 3.2 2.5 - 4.9 MG/DL   CBC WITH AUTOMATED DIFF    Collection Time: 12/13/18  3:12 AM   Result Value Ref Range    WBC 9.4 4.6 - 13.2 K/uL    RBC 3.74 (L) 4.20 - 5.30 M/uL    HGB 10.9 (L) 12.0 - 16.0 g/dL    HCT 31.7 (L) 35.0 - 45.0 %    MCV 84.8 74.0 - 97.0 FL    MCH 29.1 24.0 - 34.0 PG    MCHC 34.4 31.0 - 37.0 g/dL    RDW 13.4 11.6 - 14.5 %    PLATELET 531 960 - 624 K/uL    MPV 11.5 9.2 - 11.8 FL    NEUTROPHILS 52 40 - 73 %    LYMPHOCYTES 28 21 - 52 %    MONOCYTES 14 (H) 3 - 10 %    EOSINOPHILS 6 (H) 0 - 5 %    BASOPHILS 0 0 - 2 %    ABS. NEUTROPHILS 4.8 1.8 - 8.0 K/UL    ABS. LYMPHOCYTES 2.6 0.9 - 3.6 K/UL    ABS. MONOCYTES 1.3 (H) 0.05 - 1.2 K/UL    ABS. EOSINOPHILS 0.6 (H) 0.0 - 0.4 K/UL    ABS.  BASOPHILS 0.0 0.0 - 0.1 K/UL    DF AUTOMATED     EKG, 12 LEAD, SUBSEQUENT    Collection Time: 12/13/18  7:16 AM   Result Value Ref Range    Ventricular Rate 69 BPM    Atrial Rate 69 BPM    P-R Interval 154 ms    QRS Duration 66 ms Q-T Interval 382 ms    QTC Calculation (Bezet) 409 ms    Calculated P Axis 68 degrees    Calculated R Axis 22 degrees    Calculated T Axis -35 degrees    Diagnosis       Normal sinus rhythm  Possible Left atrial enlargement  Nonspecific T wave abnormality  Abnormal ECG  When compared with ECG of 11-DEC-2018 23:57,  ST now depressed in Inferior leads  Inverted T waves have replaced nonspecific T wave abnormality in Inferior   leads       Additional Data Reviewed:     Condition: Stable  Follow-up Appointments:   1. Your PCP: Monica Serrano MD, within 7-10days  2.  Your Cardiologist: Krista Gonzalez MD within 1-2 weeks    >30 minutes spent coordinating this discharge (review instructions/follow-up, prescriptions, preparing report for sign off)    Signed:  Lacie Dandy, PA-C  11:26 AM

## 2018-12-13 NOTE — PROGRESS NOTES
[facility]  Progress Note    Patient: Kiko Lopez Age: 46 y.o. : 1965 MR#: [id number] SSN: [id number]  Date: 2018     Subjective:     Pt reports doing okay. No chest pain or SOB. No abd pain, N/V. Addendum - next visit - nursing and cards PA informed of headache with nausea. Pt states she had a hx of migraine but was episode was last year. States frontal HA started last night. First dose of imdur was given yesterday. States HA continued today. No HA currently. No focal neurologic deficits. Reviewed medication list- Imdur as a common SE of headache and nausea. D/w Cards PA. States can stop Imdur and pt will be placed on CCB. Pt has ankle swelling with amlodipine. Assessment/Plan:   1. Recurrent NSTEMI s/p recent heart cath in 18  2. Uncontrolled Hypertension  3. Mild hypokalemia   4. Headache ? Imdur related     Plan:  1. Cardiology consulted, on treatment dose Lovenox, on DAPT. s/p cardiac cath with Occluded mid/distal OM 2.   2. Cont BP meds including prn. Stop Imdur and start Nifedipine in the am.   3. K+ replaced. Resolved. Goals of care:  Full code  Disposition:  [x]PT/OT ordered   [x] Case management referral      Case discussed with:  [x]Patient  []Family  [x]Nursing  []Case Management  DVT Prophylaxis:  []Lovenox  [x]Hep SQ  []SCDs  []Coumadin   []On Heparin gtt    Objective:   VS:   Visit Vitals  /72 (BP 1 Location: Right arm, BP Patient Position: At rest)   Pulse 68   Temp 98.8 °F (37.1 °C)   Resp 20   Ht 5' 2\" (1.575 m)   Wt 72 kg (158 lb 12.8 oz)   SpO2 95%   BMI 29.04 kg/m²      Tmax/24hrs: Temp (24hrs), Av.5 °F (36.9 °C), Min:98.2 °F (36.8 °C), Max:98.9 °F (37.2 °C)  No intake or output data in the 24 hours ending 18 1359    General:  Awake, alert, NAD  Cardiovascular:  RRR  Pulmonary: CTA   GI:  NT, normal BS  Extremities:  No edema or cyanosis  Neuro: AAOx3, no focal neurologic deficits     Labs:    Recent Results (from the past 24 hour(s))   CARDIAC PANEL,(CK, CKMB & TROPONIN)    Collection Time: 12/12/18  8:37 PM   Result Value Ref Range     (H) 26 - 192 U/L    CK - MB 15.8 (H) <3.6 ng/ml    CK-MB Index 2.7 0.0 - 4.0 %    Troponin-I, Qt. 14.70 (HH) 0.0 - 5.111 NG/ML   METABOLIC PANEL, BASIC    Collection Time: 12/13/18  3:12 AM   Result Value Ref Range    Sodium 137 136 - 145 mmol/L    Potassium 3.5 3.5 - 5.5 mmol/L    Chloride 105 100 - 108 mmol/L    CO2 24 21 - 32 mmol/L    Anion gap 8 3.0 - 18 mmol/L    Glucose 91 74 - 99 mg/dL    BUN 14 7.0 - 18 MG/DL    Creatinine 0.56 (L) 0.6 - 1.3 MG/DL    BUN/Creatinine ratio 25 (H) 12 - 20      GFR est AA >60 >60 ml/min/1.73m2    GFR est non-AA >60 >60 ml/min/1.73m2    Calcium 8.2 (L) 8.5 - 10.1 MG/DL   MAGNESIUM    Collection Time: 12/13/18  3:12 AM   Result Value Ref Range    Magnesium 2.2 1.6 - 2.6 mg/dL   PHOSPHORUS    Collection Time: 12/13/18  3:12 AM   Result Value Ref Range    Phosphorus 3.2 2.5 - 4.9 MG/DL   CBC WITH AUTOMATED DIFF    Collection Time: 12/13/18  3:12 AM   Result Value Ref Range    WBC 9.4 4.6 - 13.2 K/uL    RBC 3.74 (L) 4.20 - 5.30 M/uL    HGB 10.9 (L) 12.0 - 16.0 g/dL    HCT 31.7 (L) 35.0 - 45.0 %    MCV 84.8 74.0 - 97.0 FL    MCH 29.1 24.0 - 34.0 PG    MCHC 34.4 31.0 - 37.0 g/dL    RDW 13.4 11.6 - 14.5 %    PLATELET 739 789 - 946 K/uL    MPV 11.5 9.2 - 11.8 FL    NEUTROPHILS 52 40 - 73 %    LYMPHOCYTES 28 21 - 52 %    MONOCYTES 14 (H) 3 - 10 %    EOSINOPHILS 6 (H) 0 - 5 %    BASOPHILS 0 0 - 2 %    ABS. NEUTROPHILS 4.8 1.8 - 8.0 K/UL    ABS. LYMPHOCYTES 2.6 0.9 - 3.6 K/UL    ABS. MONOCYTES 1.3 (H) 0.05 - 1.2 K/UL    ABS. EOSINOPHILS 0.6 (H) 0.0 - 0.4 K/UL    ABS.  BASOPHILS 0.0 0.0 - 0.1 K/UL    DF AUTOMATED     EKG, 12 LEAD, SUBSEQUENT    Collection Time: 12/13/18  7:16 AM   Result Value Ref Range    Ventricular Rate 69 BPM    Atrial Rate 69 BPM    P-R Interval 154 ms    QRS Duration 66 ms    Q-T Interval 382 ms    QTC Calculation (Bezet) 409 ms    Calculated P Axis 68 degrees    Calculated R Axis 22 degrees    Calculated T Axis -35 degrees    Diagnosis       Normal sinus rhythm  Possible Left atrial enlargement  Nonspecific T wave abnormality  Abnormal ECG  When compared with ECG of 11-DEC-2018 23:57,  ST now depressed in Inferior leads  Inverted T waves have replaced nonspecific T wave abnormality in Inferior   leads     CARDIAC PANEL,(CK, CKMB & TROPONIN)    Collection Time: 12/13/18 11:17 AM   Result Value Ref Range     (H) 26 - 192 U/L    CK - MB 6.2 (H) <3.6 ng/ml    CK-MB Index 1.5 0.0 - 4.0 %    Troponin-I, Qt. 14.50 (HH) 0.0 - 0.045 NG/ML       Signed By: Monika Ruiz PA-C     December 13, 2018 1:59 PM

## 2018-12-14 VITALS
OXYGEN SATURATION: 96 % | WEIGHT: 157.8 LBS | HEIGHT: 62 IN | RESPIRATION RATE: 16 BRPM | DIASTOLIC BLOOD PRESSURE: 82 MMHG | BODY MASS INDEX: 29.04 KG/M2 | TEMPERATURE: 98.1 F | SYSTOLIC BLOOD PRESSURE: 178 MMHG | HEART RATE: 56 BPM

## 2018-12-14 LAB
ANION GAP SERPL CALC-SCNC: 8 MMOL/L (ref 3–18)
B-HEM STREP THROAT QL CULT: NEGATIVE
BACTERIA SPEC CULT: NORMAL
BASOPHILS # BLD: 0 K/UL (ref 0–0.1)
BASOPHILS NFR BLD: 0 % (ref 0–2)
BUN SERPL-MCNC: 10 MG/DL (ref 7–18)
BUN/CREAT SERPL: 17 (ref 12–20)
CALCIUM SERPL-MCNC: 9 MG/DL (ref 8.5–10.1)
CHLORIDE SERPL-SCNC: 106 MMOL/L (ref 100–108)
CO2 SERPL-SCNC: 26 MMOL/L (ref 21–32)
CREAT SERPL-MCNC: 0.6 MG/DL (ref 0.6–1.3)
DIFFERENTIAL METHOD BLD: ABNORMAL
EOSINOPHIL # BLD: 0.3 K/UL (ref 0–0.4)
EOSINOPHIL NFR BLD: 4 % (ref 0–5)
ERYTHROCYTE [DISTWIDTH] IN BLOOD BY AUTOMATED COUNT: 13.1 % (ref 11.6–14.5)
GLUCOSE SERPL-MCNC: 105 MG/DL (ref 74–99)
HCT VFR BLD AUTO: 31.6 % (ref 35–45)
HGB BLD-MCNC: 10.7 G/DL (ref 12–16)
LYMPHOCYTES # BLD: 3.3 K/UL (ref 0.9–3.6)
LYMPHOCYTES NFR BLD: 35 % (ref 21–52)
MCH RBC QN AUTO: 28.8 PG (ref 24–34)
MCHC RBC AUTO-ENTMCNC: 33.9 G/DL (ref 31–37)
MCV RBC AUTO: 85.2 FL (ref 74–97)
MONOCYTES # BLD: 0.9 K/UL (ref 0.05–1.2)
MONOCYTES NFR BLD: 10 % (ref 3–10)
NEUTS SEG # BLD: 4.7 K/UL (ref 1.8–8)
NEUTS SEG NFR BLD: 51 % (ref 40–73)
PLATELET # BLD AUTO: 296 K/UL (ref 135–420)
PMV BLD AUTO: 11.8 FL (ref 9.2–11.8)
POTASSIUM SERPL-SCNC: 3.7 MMOL/L (ref 3.5–5.5)
RBC # BLD AUTO: 3.71 M/UL (ref 4.2–5.3)
SERVICE CMNT-IMP: NORMAL
SODIUM SERPL-SCNC: 140 MMOL/L (ref 136–145)
WBC # BLD AUTO: 9.3 K/UL (ref 4.6–13.2)

## 2018-12-14 PROCEDURE — 36415 COLL VENOUS BLD VENIPUNCTURE: CPT

## 2018-12-14 PROCEDURE — 80048 BASIC METABOLIC PNL TOTAL CA: CPT

## 2018-12-14 PROCEDURE — 74011250637 HC RX REV CODE- 250/637: Performed by: PHYSICIAN ASSISTANT

## 2018-12-14 PROCEDURE — 74011250637 HC RX REV CODE- 250/637: Performed by: INTERNAL MEDICINE

## 2018-12-14 PROCEDURE — 85025 COMPLETE CBC W/AUTO DIFF WBC: CPT

## 2018-12-14 PROCEDURE — 74011250636 HC RX REV CODE- 250/636: Performed by: INTERNAL MEDICINE

## 2018-12-14 RX ORDER — NIFEDIPINE 30 MG/1
30 TABLET, EXTENDED RELEASE ORAL DAILY
Qty: 30 TAB | Refills: 0 | Status: SHIPPED | OUTPATIENT
Start: 2018-12-15 | End: 2019-01-24

## 2018-12-14 RX ADMIN — METOPROLOL TARTRATE 50 MG: 50 TABLET ORAL at 09:26

## 2018-12-14 RX ADMIN — ENOXAPARIN SODIUM 70 MG: 80 INJECTION SUBCUTANEOUS at 07:22

## 2018-12-14 RX ADMIN — Medication 10 ML: at 07:23

## 2018-12-14 RX ADMIN — CLOPIDOGREL BISULFATE 75 MG: 75 TABLET ORAL at 09:26

## 2018-12-14 RX ADMIN — OXYCODONE AND ACETAMINOPHEN 1 TABLET: 5; 325 TABLET ORAL at 07:29

## 2018-12-14 RX ADMIN — LISINOPRIL 20 MG: 20 TABLET ORAL at 09:26

## 2018-12-14 RX ADMIN — ASPIRIN 81 MG 81 MG: 81 TABLET ORAL at 09:26

## 2018-12-14 RX ADMIN — DOCUSATE SODIUM 100 MG: 100 CAPSULE, LIQUID FILLED ORAL at 09:26

## 2018-12-14 RX ADMIN — NIFEDIPINE 30 MG: 30 TABLET, FILM COATED, EXTENDED RELEASE ORAL at 09:26

## 2018-12-14 NOTE — PROGRESS NOTES
Problem: Falls - Risk of  Goal: *Absence of Falls  Document Dl Fall Risk and appropriate interventions in the flowsheet.   Outcome: Progressing Towards Goal  Fall Risk Interventions:            Medication Interventions: Patient to call before getting OOB, Teach patient to arise slowly

## 2018-12-14 NOTE — ROUTINE PROCESS
Bedside shift change report given to Carlos (oncoming nurse) by Pily Spangler RN (offgoing nurse). Report included the following information SBAR, Kardex, Intake/Output, MAR, Recent Results and Cardiac Rhythm NSR/Sinus Moira Hyde.

## 2018-12-14 NOTE — ROUTINE PROCESS
Bedside verbal report received from [facility], Patient awake and alert. Reviewed SBAR, MAR, VS,labs and summary of care. Patient denies pain at present, call light in reach and patient.

## 2018-12-14 NOTE — PROGRESS NOTES
D/c noted for today. No d/c needs, will go home.  Pt's brother will transport home.      MARCOS Gayle  Case Management  [phone]

## 2018-12-14 NOTE — DISCHARGE INSTRUCTIONS
Patient armband removed and shredded    MyChart Activation    Thank you for requesting access to [facility]. Please follow the instructions below to securely access and download your online medical record. [facility] allows you to send messages to your doctor, view your test results, renew your prescriptions, schedule appointments, and more. How Do I Sign Up? 1. In your internet browser, go to [email or web address]  2. Click on the First Time User? Click Here link in the Sign In box. You will be redirect to the New Member Sign Up page. 3. Enter your [facility] Access Code exactly as it appears below. You will not need to use this code after youve completed the sign-up process. If you do not sign up before the expiration date, you must request a new code. [facility] Access Code: [email or web address]  Expires: 2019  7:26 AM (This is the date your [facility] access code will )    4. Enter the last four digits of your Social Security Number (xxxx) and Date of Birth (mm/dd/yyyy) as indicated and click Submit. You will be taken to the next sign-up page. 5. Create a [facility] ID. This will be your [facility] login ID and cannot be changed, so think of one that is secure and easy to remember. 6. Create a [facility] password. You can change your password at any time. 7. Enter your Password Reset Question and Answer. This can be used at a later time if you forget your password. 8. Enter your e-mail address. You will receive e-mail notification when new information is available in [address] 9. Click Sign Up. You can now view and download portions of your medical record. 10. Click the Download Summary menu link to download a portable copy of your medical information. Additional Information    If you have questions, please visit the Frequently Asked Questions section of the [facility] website at [email or web address] Remember, [facility] is NOT to be used for urgent needs.  For medical emergencies, dial 46.             Learning About Coronary Artery Disease (CAD)  What is coronary artery disease? Coronary artery disease (CAD) occurs when plaque builds up in the arteries that bring oxygen-rich blood to your heart. Plaque is a fatty substance made of cholesterol, calcium, and other substances in the blood. This process is called hardening of the arteries, or atherosclerosis. What happens when you have coronary artery disease? · Plaque may narrow the coronary arteries. Narrowed arteries cause poor blood flow. This can lead to angina symptoms such as chest pain or discomfort. If blood flow is completely blocked, you could have a heart attack. · You can slow CAD and reduce the risk of future problems by making changes in your lifestyle. These include quitting smoking and eating heart-healthy foods. · Treatments for CAD, along with changes in your lifestyle, can help you live a longer and healthier life. How can you prevent coronary artery disease? · Do not smoke. It may be the best thing you can do to prevent heart disease. If you need help quitting, talk to your doctor about stop-smoking programs and medicines. These can increase your chances of quitting for good. · Be active. Get at least 30 minutes of exercise on most days of the week. Walking is a good choice. You also may want to do other activities, such as running, swimming, cycling, or playing tennis or team sports. · Eat heart-healthy foods. Eat more fruits and vegetables and less foods that contain saturated and trans fats. Limit alcohol, sodium, and sweets. · Stay at a healthy weight. Lose weight if you need to. · Manage other health problems such as diabetes, high blood pressure, and high cholesterol. · Manage stress. Stress can hurt your heart. To keep stress low, talk about your problems and feelings. Don't keep your feelings hidden. · If you have talked about it with your doctor, take a low-dose aspirin every day.  Aspirin can help certain people lower their risk of a heart attack or stroke. But taking aspirin isn't right for everyone, because it can cause serious bleeding. Do not start taking daily aspirin unless your doctor knows about it. How is coronary artery disease treated? · Your doctor will suggest that you make lifestyle changes. For example, your doctor may ask you to eat healthy foods, quit smoking, lose extra weight, and be more active. · You will have to take medicines. · Your doctor may suggest a procedure to open narrowed or blocked arteries. This is called angioplasty. Or your doctor may suggest using healthy blood vessels to create detours around narrowed or blocked arteries. This is called bypass surgery. Follow-up care is a key part of your treatment and safety. Be sure to make and go to all appointments, and call your doctor if you are having problems. It's also a good idea to know your test results and keep a list of the medicines you take. Where can you learn more? Go to http://kings-junito.info/. Enter [phone] [id number] in the search box to learn more about \"Learning About Coronary Artery Disease (CAD). \"  Current as of: December 6, 2017  Content Version: 11.8  © 5731-8383 [employer]. Care instructions adapted under license by [employer] (which disclaims liability or warranty for this information). If you have questions about a medical condition or this instruction, always ask your healthcare professional. [place] [address] any warranty or liability for your use of this information. Learning About ARBs  Introduction    ARBs (angiotensin II receptor blockers) block a hormone that makes blood vessels narrow. As a result, the blood vessels relax and widen. This lowers blood pressure. ARBs also put more water and salt into the urine. This also lowers blood pressure. ARBs can treat:  · High blood pressure. · Coronary artery disease. · Heart failure.   They also may be used to help your kidneys when you have diabetes. Examples  ARBs include:  · Candesartan (Atacand). · Irbesartan (Avapro). · Losartan (Cozaar). This is not a complete list of all ARBs. Possible side effects  Side effects may include:  · Low blood pressure. You may feel dizzy and weak. · Diarrhea. · High potassium levels. You may have other side effects or reactions not listed here. Check the information that comes with your medicine. What to know about taking this medicine  · ARBs may be used if you had a cough when you tried to take an ACE inhibitor. ARBs are less likely to cause a cough. · You may need regular blood tests. · Take your medicines exactly as prescribed. Call your doctor if you think you are having a problem with your medicine. · Tell your doctor or pharmacist all the medicines you take. This includes over-the-counter medicines, vitamins, herbal products, and supplements. Taking some medicines together can cause problems. · You should not take ARBs if you are pregnant or planning to become pregnant. Where can you learn more? Go to http://kings-junito.info/. Enter K212 in the search box to learn more about \"Learning About ARBs. \"  Current as of: December 6, 2017  Content Version: 11.8  © 1593-8305 [employer]. Care instructions adapted under license by [employer] (which disclaims liability or warranty for this information). If you have questions about a medical condition or this instruction, always ask your healthcare professional. [place] [address] any warranty or liability for your use of this information. DASH Diet: Care Instructions  Your Care Instructions    The DASH diet is an eating plan that can help lower your blood pressure. DASH stands for Dietary Approaches to Stop Hypertension. Hypertension is high blood pressure.   The DASH diet focuses on eating foods that are high in calcium, potassium, and magnesium. These nutrients can lower blood pressure. The foods that are highest in these nutrients are fruits, vegetables, low-fat dairy products, nuts, seeds, and legumes. But taking calcium, potassium, and magnesium supplements instead of eating foods that are high in those nutrients does not have the same effect. The DASH diet also includes whole grains, fish, and poultry. The DASH diet is one of several lifestyle changes your doctor may recommend to lower your high blood pressure. Your doctor may also want you to decrease the amount of sodium in your diet. Lowering sodium while following the DASH diet can lower blood pressure even further than just the DASH diet alone. Follow-up care is a key part of your treatment and safety. Be sure to make and go to all appointments, and call your doctor if you are having problems. It's also a good idea to know your test results and keep a list of the medicines you take. How can you care for yourself at home? Following the DASH diet  · Eat 4 to 5 servings of fruit each day. A serving is 1 medium-sized piece of fruit, ½ cup chopped or canned fruit, 1/4 cup dried fruit, or 4 ounces (½ cup) of fruit juice. Choose fruit more often than fruit juice. · Eat 4 to 5 servings of vegetables each day. A serving is 1 cup of lettuce or raw leafy vegetables, ½ cup of chopped or cooked vegetables, or 4 ounces (½ cup) of vegetable juice. Choose vegetables more often than vegetable juice. · Get 2 to 3 servings of low-fat and fat-free dairy each day. A serving is 8 ounces of milk, 1 cup of yogurt, or 1 ½ ounces of cheese. · Eat 6 to 8 servings of grains each day. A serving is 1 slice of bread, 1 ounce of dry cereal, or ½ cup of cooked rice, pasta, or cooked cereal. Try to choose whole-grain products as much as possible. · Limit lean meat, poultry, and fish to 2 servings each day. A serving is 3 ounces, about the size of a deck of cards.   · Eat 4 to 5 servings of nuts, seeds, and legumes (cooked dried beans, lentils, and split peas) each week. A serving is 1/3 cup of nuts, 2 tablespoons of seeds, or ½ cup of cooked beans or peas. · Limit fats and oils to 2 to 3 servings each day. A serving is 1 teaspoon of vegetable oil or 2 tablespoons of salad dressing. · Limit sweets and added sugars to 5 servings or less a week. A serving is 1 tablespoon jelly or jam, ½ cup sorbet, or 1 cup of lemonade. · Eat less than 2,300 milligrams (mg) of sodium a day. If you limit your sodium to 1,500 mg a day, you can lower your blood pressure even more. Tips for success  · Start small. Do not try to make dramatic changes to your diet all at once. You might feel that you are missing out on your favorite foods and then be more likely to not follow the plan. Make small changes, and stick with them. Once those changes become habit, add a few more changes. · Try some of the following:  ? Make it a goal to eat a fruit or vegetable at every meal and at snacks. This will make it easy to get the recommended amount of fruits and vegetables each day. ? Try yogurt topped with fruit and nuts for a snack or healthy dessert. ? Add lettuce, tomato, cucumber, and onion to sandwiches. ? Combine a ready-made pizza crust with low-fat mozzarella cheese and lots of vegetable toppings. Try using tomatoes, squash, spinach, broccoli, carrots, cauliflower, and onions. ? Have a variety of cut-up vegetables with a low-fat dip as an appetizer instead of chips and dip. ? Sprinkle sunflower seeds or chopped almonds over salads. Or try adding chopped walnuts or almonds to cooked vegetables. ? Try some vegetarian meals using beans and peas. Add garbanzo or kidney beans to salads. Make burritos and tacos with mashed small beans or black beans. Where can you learn more? Go to http://kings-ujnito.info/. Enter P123 in the search box to learn more about \"DASH Diet: Care Instructions. \"  Current as of: December 6, 2017  Content Version: 11.8  © 5704-4030 [employer]. Care instructions adapted under license by [employer] (which disclaims liability or warranty for this information). If you have questions about a medical condition or this instruction, always ask your healthcare professional. [address] any warranty or liability for your use of this information. Reducing Heart Attack Risk With Daily Medicine: Care Instructions  Your Care Instructions    Heart disease is the number one cause of death. If you are at risk for heart disease, there are many medicines that can reduce your risk. These include:  · ACE inhibitors or ARBs. These are types of blood pressure medicines. They can reduce the risk of heart attacks and strokes if you are at high risk. · Statin medicines. These lower cholesterol. They can also reduce the risk of heart disease and strokes. · Aspirin and other antiplatelets. These prevent blood clots. They can help certain people lower their risk of a heart attack or stroke. · Beta-blocker medicines. These are a type of blood pressure and heart medicine. They can reduce the chance of early death if you have had a heart attack. All medicines can cause side effects. So it is important to understand the pros and cons of any medicine you take. It is also important to take your medicines exactly as your doctor tells you to. Follow-up care is a key part of your treatment and safety. Be sure to make and go to all appointments, and call your doctor if you are having problems. It's also a good idea to know your test results and keep a list of the medicines you take. ACE inhibitors  ACE (angiotensin-converting enzyme) inhibitors are used for three main reasons. They lower blood pressure, protect the kidneys, and prevent heart attacks and strokes. Examples include benazepril (Lotensin), lisinopril (Prinivil, Zestril), and ramipril (Altace).   An angiotensin II receptor blocker (ARB) may be used instead of an ACE inhibitor. ARBs help you in the same ways as ACE inhibitors. Examples include candesartan (Atacand), irbesartan (Avapro), losartan (Cozaar). Before you start taking an ACE inhibitor or an ARB, make sure your doctor knows if:  · You are taking a water pill (diuretic). · You are taking potassium pills or using salt substitutes. · You are pregnant or breastfeeding. · You have had a kidney transplant or other kidney problems. ACE inhibitors and ARBs can cause side effects. Call your doctor right away if you have:  · Trouble breathing. · Swelling in your face, head, neck, or tongue. · Dizziness or lightheadedness. · A dry cough. Statins  Statins lower cholesterol. Examples include atorvastatin (Lipitor), lovastatin (Mevacor), pravastatin (Pravachol), and simvastatin (Zocor). Before you start taking a statin, make sure your doctor knows if:  · You have had a kidney transplant or other kidney problems. · You have liver disease. · You take any other prescription medicine, over-the-counter medicine, vitamins, supplements, or herbal remedies. · You are pregnant or breastfeeding. Statins can cause side effects. Call your doctor right away if you have:  · New, severe muscle aches. · Brown urine. Aspirin  Taking an aspirin every day can lower your risk for a heart attack. A heart attack occurs when a blood vessel in the heart gets blocked. When this happens, oxygen can't get to the heart muscle, and part of the heart dies. Aspirin can help prevent blood clots that can block the blood vessels. Talk to your doctor before you start taking aspirin every day. He or she may recommend that you take one low-dose aspirin (81 mg) tablet each day, with a meal and a full glass of water. Taking aspirin isn't right for everyone. This is because it can cause serious bleeding. And you may not be able to use aspirin if you:  · Have asthma.   · Have an ulcer or other stomach problem. · Take some other medicine (called a blood thinner) that prevents blood clots. · Are allergic to aspirin. Before having a surgery or procedure, tell your doctor or dentist that you take aspirin. He or she will tell you if you should stop taking aspirin beforehand. Make sure that you understand exactly what your doctor wants you to do. Aspirin can cause side effects. Call your doctor right away if you have:  · Unusual bleeding or bruising. · Nausea, vomiting, or heartburn. · Black or bloody stools. Beta-blockers  Beta-blockers are used for three main reasons. They lower blood pressure, relieve angina symptoms (such as chest pain or pressure), and reduce the chances of a second heart attack. They include atenolol (Tenormin), carvedilol (Coreg), and metoprolol (Lopressor). Before you start taking a beta-blocker, make sure your doctor knows if you have:  · Severe asthma or frequent asthma attacks. · A very slow pulse (less than 55 beats a minute). Beta-blockers can cause side effects. Call your doctor right away if you have:  · Wheezing or trouble breathing. · Dizziness or lightheadedness. · Asthma that gets worse. When should you call for help? Watch closely for changes in your health, and be sure to contact your doctor if you have any problems. Where can you learn more? Go to http://kings-junito.info/. Enter R428 in the search box to learn more about \"Reducing Heart Attack Risk With Daily Medicine: Care Instructions. \"  Current as of: December 6, 2017  Content Version: 11.8  © 5342-4789 [employer]. Care instructions adapted under license by [employer] (which disclaims liability or warranty for this information). If you have questions about a medical condition or this instruction, always ask your healthcare professional. [address] any warranty or liability for your use of this information.            Heart Attack: Care Instructions  Your Care Instructions    A heart attack (myocardial infarction, or MI) occurs when one or more of the coronary arteries, which supply the heart with oxygen-rich blood, is blocked. A blockage usually occurs when plaque inside the artery breaks open and a blood clot forms in the artery. After a heart attack, you may be worried about your future. Over the next several weeks, your heart will start to heal. Though it can be hard to break old habits, you can prevent another heart attack by making some lifestyle changes and by taking medicines. You may use this information for ideas about what to do at home to speed your recovery. Follow-up care is a key part of your treatment and safety. Be sure to make and go to all appointments, and call your doctor if you are having problems. It's also a good idea to know your test results and keep a list of the medicines you take. How can you care for yourself at home? Activity    · Until your doctor says it is okay, do not do strenuous exercise. And do not lift, pull, or push anything heavy. Ask your doctor what types of activities are safe for you.     · If your doctor has not set you up with a cardiac rehabilitation (rehab) program, talk to him or her about whether that is right for you. Cardiac rehab includes supervised exercise. It also includes help with diet and lifestyle changes and emotional support. It may reduce your risk of future heart problems.     · Increase your activities slowly. Take short rest breaks when you get tired.     · If your doctor recommends it, get more exercise. Walking is a good choice. Bit by bit, increase the amount you walk every day. Try for at least 30 minutes on most days of the week. You also may want to swim, bike, or do other activities.  Talk with your doctor before you start an exercise program to make sure it is safe for you.     · Ask your doctor when you can drive, go back to work, and do other daily activities again.     · You can have sex as soon as you feel ready for it. Often this means when you can easily walk around or climb stairs. Talk with your doctor if you have any concerns. If you are taking nitroglycerin, do not take erection-enhancing medicine such as sildenafil (Viagra), tadalafil (Cialis), or vardenafil (Levitra) .    Lifestyle changes    · Do not smoke. Smoking increases your risk of another heart attack. If you need help quitting, talk to your doctor about stop-smoking programs and medicines. These can increase your chances of quitting for good.     · Eat a heart-healthy diet that is low in saturated fat and salt, and is full of fruits, vegetables and whole-grains. Eat at least two servings of fish each week. You may get more details about how to eat healthy. But these tips can help you get started.     · Stay at a healthy weight, or lose weight if you need to. Medicines    · Be safe with medicines. Take your medicines exactly as prescribed. Call your doctor if you think you are having a problem with your medicine. You will get more details on the specific medicines your doctor prescribes. Do not stop taking your medicine unless your doctor tells you to. Not taking your medicine might raise your risk of having another heart attack.     · You may need several medicines to help lower your risk of another heart attack. These include:  ? Blood pressure medicines such as angiotensin-converting enzyme (ACE) inhibitors, ARBs (angiotensin II receptor blockers), and beta-blockers. ? Cholesterol medicine called statins. ? Aspirin and other blood thinners. These prevent blood clots that can cause a heart attack.     · If your doctor has given you nitroglycerin, keep it with you at all times. If you have angina symptoms, such as chest pain or pressure, sit down and rest. Take the first dose of nitroglycerin as directed. If symptoms get worse or are not getting better within 5 minutes, call 911 right away. Stay on the phone. The emergency  will tell you what to do.     · Do not take any over-the-counter medicines, vitamins, or herbal products without talking to your doctor first.    Staying healthy    · Manage other health conditions such as high blood pressure and diabetes.     · Avoid colds and flu. Get a pneumococcal vaccine shot. If you have had one before, ask your doctor whether you need another dose. Get the flu vaccine every year. If you must be around people with colds or flu, wash your hands often.     · Be sure to tell your doctor about any angina symptoms you have had, even if they went away. Pay attention to your angina symptoms. Know what is typical for you and learn how to control it. Know when to call for help.     · Talk to your family, friends, or a counselor about your feelings. It is normal to feel frightened, angry, hopeless, helpless, and even guilty. Talking openly about bad feelings can help you cope. If you have symptoms of depression, talk to your doctor. When should you call for help? Call 911 anytime you think you may need emergency care. For example, call if:    · You have symptoms of a heart attack. These may include:  ? Chest pain or pressure, or a strange feeling in the chest.  ? Sweating. ? Shortness of breath. ? Nausea or vomiting. ? Pain, pressure, or a strange feeling in the back, neck, jaw, or upper belly or in one or both shoulders or arms. ? Lightheadedness or sudden weakness. ? A fast or irregular heartbeat. After you call 911, the  may tell you to chew 1 adult-strength or 2 to 4 low-dose aspirin. Wait for an ambulance.  Do not try to drive yourself.     · You have angina symptoms (such as chest pain or pressure) that do not go away with rest or are not getting better within 5 minutes after you take a dose of nitroglycerin.     · You passed out (lost consciousness).     · You feel like you are having another heart attack.    Call your doctor now or seek immediate medical care if:    · You are having angina symptoms, such as chest pain or pressure, more often than usual, or the symptoms are different or worse than usual.     · You have new or increased shortness of breath.     · You are dizzy or lightheaded, or you feel like you may faint.    Watch closely for changes in your health, and be sure to contact your doctor if you have any problems. Where can you learn more? Go to http://kings-junito.info/. Enter [id number] in the search box to learn more about \"Heart Attack: Care Instructions. \"  Current as of: December 6, 2017  Content Version: 11.8  © 6931-9491 [employer]. Care instructions adapted under license by [employer] (which disclaims liability or warranty for this information). If you have questions about a medical condition or this instruction, always ask your healthcare professional. [place] [address] any warranty or liability for your use of this information. Statins: Care Instructions  Your Care Instructions    Statins are medicines that lower your cholesterol and your risk for a heart attack and stroke. Cholesterol is a type of fat in your blood. If you have too much cholesterol, it can build up in blood vessels. This raises your risk of heart disease, heart attack, and stroke. Statins lower cholesterol by blocking how much your body makes. This prevents cholesterol from building up in your blood vessels. This is called hardening of the arteries. It is the starting point for some heart and blood flow problems, such as heart disease. Statins may also reduce inflammation around the buildup (called plaque). This can lower the risk that the plaque will break apart and lead to a heart attack or stroke. A heart-healthy lifestyle is important for lowering your risk whether you take statins or not. This includes eating healthy foods, being active, staying at a healthy weight, and not smoking.   You must take statins regularly for them to work well. If you stop, your cholesterol and your risk will go back up. Examples of statins include:  · Atorvastatin (Lipitor). · Lovastatin (Mevacor). · Pravastatin (Pravachol). · Simvastatin (Zocor). Statins interact with many medicines. So tell your doctor all of the other medicines that you take. These include prescription medicines, over-the-counter medicines, dietary supplements, and herbal products. Follow-up care is a key part of your treatment and safety. Be sure to make and go to all appointments, and call your doctor if you are having problems. It's also a good idea to know your test results and keep a list of the medicines you take. How can you care for yourself at home? · Take statins exactly as your doctor tells you. High cholesterol has no symptoms. So it is easy to forget to take the pills. Try to make a system that reminds you to take them. · Do not take two or more medicines at the same time unless the doctor told you to. Statins can interact with other medicines. · Always tell your doctor if you think you are having a side effect. If side effects are a problem with one medicine, a different one may be used. · Keep making the lifestyle changes your doctor suggests. Eat heart-healthy foods, be active, don't smoke, and stay at a healthy weight. · Talk to your doctor about avoiding grapefruit juice if you take statins. Grapefruit juice can raise the level of this medicine in your blood. This could increase side effects. When should you call for help? Watch closely for changes in your health, and be sure to contact your doctor if:    · You think you are having problems with your medicine.     · You have aches or muscle pain. Where can you learn more? Go to http://kings-junito.info/. Enter R358 in the search box to learn more about \"Statins: Care Instructions. \"  Current as of: December 6, 2017  Content Version: 11.8  © 2562-9871 Health[employer]. Care instructions adapted under license by [employer] (which disclaims liability or warranty for this information). If you have questions about a medical condition or this instruction, always ask your healthcare professional. [address] any warranty or liability for your use of this information. DISCHARGE SUMMARY from Nurse    PATIENT INSTRUCTIONS:    After general anesthesia or intravenous sedation, for 24 hours or while taking prescription Narcotics:  · Limit your activities  · Do not drive and operate hazardous machinery  · Do not make important personal or business decisions  · Do  not drink alcoholic beverages  · If you have not urinated within 8 hours after discharge, please contact your surgeon on call. Report the following to your surgeon:  · Excessive pain, swelling, redness or odor of or around the surgical area  · Temperature over 100.5  · Nausea and vomiting lasting longer than 4 hours or if unable to take medications  · Any signs of decreased circulation or nerve impairment to extremity: change in color, persistent  numbness, tingling, coldness or increase pain  · Any questions    What to do at Home:  Recommended activity: Activity as tolerated,     If you experience any of the following symptoms shortness of breath, sudden chest pain, arm pain or numbness, abdominal pain, please follow up with 911. *  Please give a list of your current medications to your Primary Care Provider. *  Please update this list whenever your medications are discontinued, doses are      changed, or new medications (including over-the-counter products) are added. *  Please carry medication information at all times in case of emergency situations.     These are general instructions for a healthy lifestyle:    No smoking/ No tobacco products/ Avoid exposure to second hand smoke  Surgeon General's Warning:  Quitting smoking now greatly reduces serious risk to your health. Obesity, smoking, and sedentary lifestyle greatly increases your risk for illness    A healthy diet, regular physical exercise & weight monitoring are important for maintaining a healthy lifestyle    You may be retaining fluid if you have a history of heart failure or if you experience any of the following symptoms:  Weight gain of 3 pounds or more overnight or 5 pounds in a week, increased swelling in our hands or feet or shortness of breath while lying flat in bed. Please call your doctor as soon as you notice any of these symptoms; do not wait until your next office visit. Recognize signs and symptoms of STROKE:    F-face looks uneven    A-arms unable to move or move unevenly    S-speech slurred or non-existent    T-time-call 911 as soon as signs and symptoms begin-DO NOT go       Back to bed or wait to see if you get better-TIME IS BRAIN. Warning Signs of HEART ATTACK     Call 911 if you have these symptoms:   Chest discomfort. Most heart attacks involve discomfort in the center of the chest that lasts more than a few minutes, or that goes away and comes back. It can feel like uncomfortable pressure, squeezing, fullness, or pain.  Discomfort in other areas of the upper body. Symptoms can include pain or discomfort in one or both arms, the back, neck, jaw, or stomach.  Shortness of breath with or without chest discomfort.  Other signs may include breaking out in a cold sweat, nausea, or lightheadedness. Don't wait more than five minutes to call 911 - MINUTES MATTER! Fast action can save your life. Calling 911 is almost always the fastest way to get lifesaving treatment. Emergency Medical Services staff can begin treatment when they arrive -- up to an hour sooner than if someone gets to the hospital by car. The discharge information has been reviewed with the patient. The patient verbalized understanding.   Discharge medications reviewed with the patient and appropriate educational materials and side effects teaching were provided.   ___________________________________________________________________________________________________________________________________

## 2018-12-16 DIAGNOSIS — I10 ESSENTIAL HYPERTENSION: ICD-10-CM

## 2018-12-17 DIAGNOSIS — G47.9 SLEEP TROUBLE: ICD-10-CM

## 2018-12-17 NOTE — TELEPHONE ENCOUNTER
This patient contacted office for the following prescriptions to be filled:    Medication requested :   Requested Prescriptions     Pending Prescriptions Disp Refills    traZODone (DESYREL) 50 mg tablet 90 Tab 0     PCP: Chay Oliveira or Print: leah  Mail order or Local pharmacy [phone]    Scheduled appointment if not seen by current providers in office: lov 7/10/18 juan n/a

## 2018-12-19 RX ORDER — TRAZODONE HYDROCHLORIDE 50 MG/1
TABLET ORAL
Qty: 90 TAB | Refills: 0 | OUTPATIENT
Start: 2018-12-19

## 2018-12-19 RX ORDER — LISINOPRIL 10 MG/1
TABLET ORAL
Qty: 30 TAB | Refills: 0 | OUTPATIENT
Start: 2018-12-19

## 2018-12-19 NOTE — TELEPHONE ENCOUNTER
Contacted patient and verified identity using name and date of birth (2- identifiers)  Spoke with patient and she verbalized understanding of need to be seen for Rx refill. Has appt for MADHAV MARTINEZ with Dr. Reno Gonzalez tomorrow 12/20/18, will address it then.

## 2018-12-20 ENCOUNTER — OFFICE VISIT (OUTPATIENT)
Dept: FAMILY MEDICINE CLINIC | Age: 53
End: 2018-12-20

## 2018-12-20 VITALS
HEART RATE: 59 BPM | OXYGEN SATURATION: 98 % | SYSTOLIC BLOOD PRESSURE: 134 MMHG | HEIGHT: 62 IN | TEMPERATURE: 98.3 F | DIASTOLIC BLOOD PRESSURE: 90 MMHG | RESPIRATION RATE: 15 BRPM | BODY MASS INDEX: 29.3 KG/M2 | WEIGHT: 159.2 LBS

## 2018-12-20 DIAGNOSIS — I21.4 NSTEMI (NON-ST ELEVATED MYOCARDIAL INFARCTION) (HCC): Primary | ICD-10-CM

## 2018-12-20 DIAGNOSIS — G47.9 SLEEP TROUBLE: ICD-10-CM

## 2018-12-20 DIAGNOSIS — E78.5 HYPERLIPIDEMIA LDL GOAL <70: ICD-10-CM

## 2018-12-20 DIAGNOSIS — I25.119 CORONARY ARTERY DISEASE INVOLVING NATIVE CORONARY ARTERY OF NATIVE HEART WITH ANGINA PECTORIS (HCC): ICD-10-CM

## 2018-12-20 DIAGNOSIS — I10 ESSENTIAL HYPERTENSION: ICD-10-CM

## 2018-12-20 RX ORDER — TRAZODONE HYDROCHLORIDE 50 MG/1
TABLET ORAL
Qty: 90 TAB | Refills: 0 | Status: SHIPPED | OUTPATIENT
Start: 2018-12-20 | End: 2019-03-18 | Stop reason: SDUPTHER

## 2018-12-20 RX ORDER — ATORVASTATIN CALCIUM 40 MG/1
40 TABLET, FILM COATED ORAL
Qty: 90 TAB | Refills: 0 | Status: SHIPPED | OUTPATIENT
Start: 2018-12-20 | End: 2019-05-14 | Stop reason: SDUPTHER

## 2018-12-20 RX ORDER — METOPROLOL TARTRATE 50 MG/1
50 TABLET ORAL EVERY 12 HOURS
Qty: 180 TAB | Refills: 0 | Status: SHIPPED | OUTPATIENT
Start: 2018-12-20 | End: 2019-03-07 | Stop reason: ALTCHOICE

## 2018-12-20 NOTE — PATIENT INSTRUCTIONS
Coronary Artery Disease: Care Instructions  Your Care Instructions    The heart is a muscle, and like any muscle, it needs blood to work well. Coronary artery disease occurs when the arteries that bring oxygen-rich blood to your heart have a buildup of plaque--deposits of fats and other substances. Plaque can reduce blood flow to the heart muscle. This can cause angina symptoms such as chest pain or pressure. A heart attack can happen if blood flow is completely blocked. You can do a lot to improve your health and prevent a heart attack. Eating healthy food, not smoking, getting regular exercise, and taking your medicine are the main things you can do every day to stay healthy. Follow-up care is a key part of your treatment and safety. Be sure to make and go to all appointments, and call your doctor if you are having problems. It's also a good idea to know your test results and keep a list of the medicines you take. How can you care for yourself at home? Medicines    · Be safe with medicines. Take your medicines exactly as prescribed. Call your doctor if you think you are having a problem with your medicine. You will get more details on the specific medicines your doctor prescribes. You may need several medicines. ? Angiotensin-converting enzyme (ACE) inhibitors, angiotensin II receptor blockers (ARBs), beta-blockers, and statins can help prevent a heart attack. ACE inhibitors, ARBs, and beta-blockers help lower your blood pressure. Statins help lower cholesterol, which is a type of fat that can clog your arteries. ? Nitrates can help make chest pain happen less often. ? Aspirin and other blood thinners help prevent heart attacks and strokes.     · If your doctor has given you nitroglycerin for angina symptoms (such as chest pain or pressure) keep it with you at all times. If you have symptoms, sit down and rest, and take the first dose of nitroglycerin as directed.  If your symptoms get worse or are not getting better within 5 minutes, call 911 right away. Stay on the phone. The emergency  will give you further instructions.     · Be sure to tell your doctor about any angina symptoms that you have had, even if they went away.     · Do not take any over-the-counter medicines, vitamins, or herbal products without talking to your doctor first.   Megha Wattsam your doctor if a cardiac rehab program is right for you. Cardiac rehab can help you make lifestyle changes. In cardiac rehab, a team of health professionals provides education and support to help you make new, healthy habits.    · Do not smoke. Avoid secondhand smoke too. Smoking can increase your risk of a heart attack or stroke. If you need help quitting, talk to your doctor about stop-smoking programs and medicines. These can increase your chances of quitting for good.     · Eat a heart-healthy diet that is high in fiber and low in saturated fat and sodium. ? Learn what a serving is. A \"serving\" and a \"portion\" are not always the same thing. Make sure that you are not eating larger portions than recommended. For example, a serving of pasta is ½ cup. A serving size of meat is 2 to 3 ounces; a 3-ounce serving is about the size of a deck of cards. ? Eat a variety of grain products every day. Include whole-grain foods such as oats, whole wheat bread, and brown rice. ? Eat fish, skinless poultry, lean meats, and soy products such as tofu instead of high-fat meats. Cut out all visible fat when you prepare meat. Eat at least 2 servings of fish a week. ? Eat a variety of fruit and vegetables every day. They have lots of nutrients that help protect against heart disease, and they have little--if any--fat. Keep carrots, celery, and other veggies handy for snacks. Buy fruit that is in season and store it where you can see it so that you will be tempted to eat it. Cook dishes that have a lot of veggies in them, such as stir-fried dishes and soups.   ? Read food labels and try to avoid saturated fat and trans fat. They increase your risk of heart disease. Bake, broil, grill, or steam foods instead of frying them. Use olive or canola oil when you cook. Try cholesterol-lowering spreads, such as Benecol or Take Control. ? Limit sodium. Your doctor may recommend that you limit sodium to less than 1,500 mg a day. ? Limit processed foods, including cookies and crackers. ? Limit drinks and foods with added sugar. ? Choose low-fat or fat-free milk and dairy products. ? Limit alcohol to 2 drinks a day for men and 1 drink a day for women. Too much alcohol can cause health problems.     · If your doctor recommends it, get more exercise. Walking is a good choice. Bit by bit, increase the amount you walk every day. Try for at least 30 minutes on most days of the week. You also may want to swim, bike, or do other activities.     · Stay at a healthy weight. Lose weight if you need to.     · Talk to your family, friends, or a therapist about your feelings. It is normal to feel upset about having this disease and to feel afraid of having a heart attack. Talking openly about your feelings can help you cope. If you think you have symptoms of depression, talk to your doctor.     · Avoid colds and flu. Get a pneumococcal vaccine shot. If you have had one before, ask your doctor whether you need another dose. Get a flu vaccine every year. If you must be around people with colds or flu, wash your hands often. When should you call for help? Call 911 anytime you think you may need emergency care. For example, call if:    · You have symptoms of a heart attack. These may include:  ? Chest pain or pressure, or a strange feeling in the chest.  ? Sweating. ? Shortness of breath. ? Nausea or vomiting. ? Pain, pressure, or a strange feeling in the back, neck, jaw, or upper belly or in one or both shoulders or arms. ? Lightheadedness or sudden weakness.   ? A fast or irregular heartbeat. After you call 911, the  may tell you to chew 1 adult-strength or 2 to 4 low-dose aspirin. Wait for an ambulance. Do not try to drive yourself.     · You have angina symptoms (such as chest pain or pressure) that do not go away with rest or are not getting better within 5 minutes after you take a dose of nitroglycerin.     · You passed out (lost consciousness).    Call your doctor now or seek immediate medical care if:    · You are having angina symptoms, such as chest pain or pressure, more often than usual, or they are different or worse than usual.     · You have new or increased shortness of breath.     · You are dizzy or lightheaded, or you feel like you may faint.    Watch closely for changes in your health, and be sure to contact your doctor if you have any problems. Where can you learn more? Go to http://kings-junito.info/. Enter G496 in the search box to learn more about \"Coronary Artery Disease: Care Instructions. \"  Current as of: December 6, 2017  Content Version: 11.8  © 2708-4765 [employer]. Care instructions adapted under license by [employer] (which disclaims liability or warranty for this information). If you have questions about a medical condition or this instruction, always ask your healthcare professional. [address] any warranty or liability for your use of this information.

## 2018-12-20 NOTE — PROGRESS NOTES
1. Have you been to the ER, urgent care clinic since your last visit? Hospitalized since your last visit? [facility] 12/11/2018 [place]    2. Have you seen or consulted any other health care providers outside of the [address] [place] [address] since your last visit? Include any pap smears or colon screening.  No    Chief Complaint   Patient presents with   [facility] Follow Up     Seen [facility], 12/11/18, [place]    Hypertension

## 2018-12-20 NOTE — PROGRESS NOTES
Gwyn Orta, 46 y.o.,  female    SUBJECTIVE  Ff-up admission NSTEMI 12/11/2018 (pt is new to me, dr. Obed King pt)    Discharge summary reviewed:     1. Recurrent NSTEMI s/p recent heart cath in 12/7/18: Pt admitted d/t chest pain and elevated troponin - NSTEMI, pt was managed with treatment dose Lovenox, cardiology followed pt. They did a heart cath noted a occulded mid/distal OM2 as above. No stents were placed. Cardiology recommended medical therapy. 2. Uncontrolled Hypertension: managed with DAPT, beta-blocker and CCB. 3. Mild hypokalemia: replaced and resolved while inpt. 4. Headache ? Imdur related: pt had frontal HA the night Imdur was started. MORAN continued the next day. Per discussion with cardiology, Imdur was stopped, and HA resolved. Tolerated alternative - Nifedipine without issues.      She is feeling well, no cp, sob. She is requesting medication refills. Med rec reviewed. Insomnia- on chronic trazodone effective  HTN- on BB, CCB, ACE  Reports Statin changed to lipitor. LDL 98 during admission    Patient Active Problem List   Diagnosis Code    Vitamin D deficiency E55.9    Hypertension I10    Dense breast/ next year 3D mammogram  R92.2    S/P colonoscopy with polypectomy/ repeat in 3 years around 2019 april  Z98.890    Acute coronary syndrome ([facility] [address] I24.9    Atypical chest pain R07.89    NSTEMI (non-ST elevated myocardial infarction) ([facility] [address] I21.4    Coronary artery disease involving native coronary artery I25.10    Accelerated hypertension I10       Current Outpatient Medications   Medication Sig Dispense Refill    traZODone (DESYREL) 50 mg tablet TAKE 1 TABLET BY MOUTH NIGHTLY. GENERIC FOR DESYREL 90 Tab 0    atorvastatin (LIPITOR) 40 mg tablet Take 1 Tab by mouth nightly. 90 Tab 0    metoprolol tartrate (LOPRESSOR) 50 mg tablet Take 1 Tab by mouth every twelve (12) hours. 180 Tab 0    NIFEdipine ER (PROCARDIA XL) 30 mg ER tablet Take 1 Tab by mouth daily.  30 Tab 0    aspirin 81 mg chewable tablet Take 1 Tab by mouth daily. 30 Tab 0    clopidogrel (PLAVIX) 75 mg tab Take 1 Tab by mouth daily. Take 1 Tab by mouth daily for 1 month. 30 Tab 0    lisinopril (PRINIVIL, ZESTRIL) 20 mg tablet Take 1 Tab by mouth daily. 30 Tab 1    nitroglycerin (NITROSTAT) 0.4 mg SL tablet 1 Tab by SubLINGual route every five (5) minutes as needed for Chest Pain. Up to 3 doses. 1 Bottle 1    MULTIVITAMIN WITH IRON PO Take 1 Tab by mouth daily.  FERROUS FUMARATE (IRON PO) Take 325 mg by mouth two (2) times a day.  Cholecalciferol, Vitamin D3, 1,000 unit cap Take 1,000 Units by mouth two (2) times a day.          Allergies   Allergen Reactions    Norvasc [Amlodipine] Swelling     Ankle swelling        Past Medical History:   Diagnosis Date    Anemia     Hypertension     MI (myocardial infarction) ([facility] [address]        Social History     Socioeconomic History    Marital status:      Spouse name: Not on file    Number of children: Not on file    Years of education: Not on file    Highest education level: Not on file   Social Needs    Financial resource strain: Not on file    Food insecurity - worry: Not on file    Food insecurity - inability: Not on file   Outright needs - medical: Not on file   Outright needs - non-medical: Not on file   Occupational History    Not on file   Tobacco Use    Smoking status: Never Smoker    Smokeless tobacco: Never Used   Substance and Sexual Activity    Alcohol use: No     Alcohol/week: 0.0 oz     Frequency: Never    Drug use: No    Sexual activity: Yes     Partners: Male     Birth control/protection: Surgical     Comment: tubal ligation   Other Topics Concern    Not on file   Social History Narrative    Not on file       Family History   Problem Relation Age of Onset    Breast Cancer Mother     Hypertension Mother     Diabetes Mother     Heart Failure Mother     Breast Cancer Sister     Breast Cancer Sister OBJECTIVE    Physical Exam:     Visit Vitals  /90 (BP 1 Location: Left arm, BP Patient Position: Sitting)   Pulse (!) 59   Temp 98.3 °F (36.8 °C) (Oral)   Resp 15   Ht 5' 2\" (1.575 m)   Wt 159 lb 3.2 oz (72.2 kg)   SpO2 98%   BMI 29.12 kg/m²       General: alert, well-appearing, AA, in no apparent distress or pain  Neck: supple, no adenopathy palpated  CVS: normal rate, regular rhythm, distinct S1 and S2  Lungs:clear to ausculation bilaterally, no crackles, wheezing or rhonchi noted  Abdomen: normoactive bowel sounds, soft, non-tender  Extremities: no edema, no cyanosis, MSK grossly normal  Skin: warm, no lesions, rashes noted, R groin appears normal  Psych:  mood and affect normal        ASSESSMENT/PLAN  Diagnoses and all orders for this visit:    1. NSTEMI (non-ST elevated myocardial infarction) ([facility] [address]  asyptomatic  On statin, BB, ACE, plavix  Keep appt with cardiology 1/24/18    2. Sleep trouble  Chronic  refilled-     traZODone (DESYREL) 50 mg tablet; TAKE 1 TABLET BY MOUTH NIGHTLY. GENERIC FOR DESYREL    3. Coronary artery disease involving native coronary artery of native heart with angina pectoris ([facility]  -     LIPID PANEL; Future  -     METABOLIC PANEL, COMPREHENSIVE; Future  -     CBC WITH AUTOMATED DIFF; Future    4. Essential hypertension  Elevated today, did not take medications yet  Advised close monitoring, goal <130/80  Cont ccb, bb, acei for now    5. Hyperlipidemia LDL goal <70  Recently switched to  lipitor  Monitoring    Other orders  -     atorvastatin (LIPITOR) 40 mg tablet; Take 1 Tab by mouth nightly. -     metoprolol tartrate (LOPRESSOR) 50 mg tablet; Take 1 Tab by mouth every twelve (12) hours. I spent greater than  40  minutes with pt with greater than 50% of the face to face time counseling and coordinating care       Follow-up Disposition:  Return in about 3 months (around 3/20/2019), or if symptoms worsen or fail to improve, for w/ Dr. Chinyere Hanna.       Patient understands plan of care. Patient has provided input and agrees with goals.

## 2019-01-15 RX ORDER — CLOPIDOGREL BISULFATE 75 MG/1
75 TABLET ORAL DAILY
Qty: 30 TAB | Refills: 3 | Status: SHIPPED | OUTPATIENT
Start: 2019-01-15 | End: 2019-05-16 | Stop reason: SDUPTHER

## 2019-01-16 RX ORDER — GUAIFENESIN 100 MG/5ML
81 LIQUID (ML) ORAL DAILY
Qty: 90 TAB | Refills: 3 | Status: SHIPPED | OUTPATIENT
Start: 2019-01-16

## 2019-01-24 ENCOUNTER — OFFICE VISIT (OUTPATIENT)
Dept: CARDIOLOGY CLINIC | Age: 54
End: 2019-01-24

## 2019-01-24 VITALS
HEART RATE: 55 BPM | OXYGEN SATURATION: 99 % | HEIGHT: 62 IN | DIASTOLIC BLOOD PRESSURE: 110 MMHG | SYSTOLIC BLOOD PRESSURE: 170 MMHG | WEIGHT: 162 LBS | BODY MASS INDEX: 29.81 KG/M2

## 2019-01-24 DIAGNOSIS — I21.4 NSTEMI (NON-ST ELEVATED MYOCARDIAL INFARCTION) (HCC): Primary | ICD-10-CM

## 2019-01-24 DIAGNOSIS — I10 ESSENTIAL HYPERTENSION: ICD-10-CM

## 2019-01-24 DIAGNOSIS — I25.42 SPONTANEOUS DISSECTION OF CORONARY ARTERY: ICD-10-CM

## 2019-01-24 DIAGNOSIS — I20.1 VASOSPASTIC ANGINA (HCC): ICD-10-CM

## 2019-01-24 DIAGNOSIS — I77.3 FIBROMUSCULAR DYSPLASIA (HCC): ICD-10-CM

## 2019-01-24 RX ORDER — NIFEDIPINE 60 MG/1
60 TABLET, EXTENDED RELEASE ORAL DAILY
Qty: 30 TAB | Refills: 6 | Status: SHIPPED | OUTPATIENT
Start: 2019-01-24 | End: 2019-02-14 | Stop reason: DRUGHIGH

## 2019-01-24 NOTE — PATIENT INSTRUCTIONS
Increase nifedipine 60mg once daily  Monitor b/p and heart rate and record readings(call office with any concerns)  Do not take toprol & nidfedipine is heart rate is below 55bpm  Follow up 4-6 weeks

## 2019-01-24 NOTE — PROGRESS NOTES
Skippy Fraction presents today for   Chief Complaint   Patient presents with    Chest Pain (Angina)     \"burning\" every night for the past 2 weeks     Palpitations     twice a week for 3 weeks       Skippy Fraction preferred language for health care discussion is english/other. Is someone accompanying this pt? Yes     Is the patient using any DME equipment during OV? NO     Depression Screening:  PHQ over the last two weeks 2/16/2018   Little interest or pleasure in doing things Not at all   Feeling down, depressed, irritable, or hopeless Not at all   Total Score PHQ 2 0       Learning Assessment:  Learning Assessment 2/16/2018   PRIMARY LEARNER Patient   HIGHEST LEVEL OF EDUCATION - PRIMARY LEARNER  DID NOT GRADUATE [facility] PRIMARY LEARNER [address] CAREGIVER No   PRIMARY LANGUAGE ENGLISH    NEED No   LEARNER PREFERENCE PRIMARY LISTENING   LEARNING SPECIAL TOPICS No   ANSWERED BY patient   RELATIONSHIP SELF       Abuse Screening:  Abuse Screening Questionnaire 10/25/2017   Do you ever feel afraid of your partner? N   Are you in a relationship with someone who physically or mentally threatens you? N   Is it safe for you to go home? Y       Fall Risk  No flowsheet data found. Pt currently taking Antiplatelet therapy? Plavix    Coordination of Care:  1. Have you been to the ER, urgent care clinic since your last visit? Hospitalized since your last visit? 12/11/18 for NSTEMI   2. Have you seen or consulted any other health care providers outside of the [address] [place] [address] since your last visit? Include any pap smears or colon screening.  NO

## 2019-01-24 NOTE — PROGRESS NOTES
Negrito Irving    Chief Complaint   Patient presents with    Chest Pain (Angina)     \"burning\" every night for the past 2 weeks     Palpitations     twice a week for 3 weeks   Hospital f/u NSTEMI/ nonatherosclerotic MI    HPI    Negrito Irving is a 46 y.o.  perimenopausal woman with HTN, presented to ER c/o URI symptoms including CP- ended up NSTEMI, initial cath called \"normal coronaries. \" Due to continued pain, another cath few days later revealed an occluded mid/ distal OM2. Also FMD of right iliac artery noted. Her EF remained normal without any WMA. I reviewed her hospital records in detail, and have elaborated the findings:    · NSTEMI with troponin up to 7.66, initial ECG without new ischemic cheanges, presented with CP. Recent NSTEMI with peak troponin 13.4 with nonobstructive CAD by cath as noted below. · Nonobstructive CAD by cath (LM normal, LAD minimal luminal irregularities, left Cx normal, RCA normal) by Dr. Fer Nagel 2018. Possible transient thrombus vs vasospasm. · Hypertension with elevated BP on admission. · Upper respiratory infection, recent cough and congestion with low grade fever 99.5 without leukocytosis. · Normal LV function 56-60% by echo 2018 without WMA 2018. · Mild to moderate tricuspid valve regurgitation with mild pulmonary hypertension PASP 43mmHg by echo 2018. She was discharged on medical therapy (included statin, BB, CCB, plavix). She was tried on Imdur but it gave her headaches. Since the hospitalization, she is significantly better but says almost every night she can feel some chest pressure. It goes away as soon as she takes her night medications. Otherwise, no LE swelling, no SOB. She has just started walking again but hasnt returned to work. She works in home care.     Past Medical History:   Diagnosis Date    Anemia     Hypertension     MI (myocardial infarction) ([address]        Past Surgical History:   Procedure Laterality Date    HX APPENDECTOMY      HX HEART CATHETERIZATION         Current Outpatient Medications   Medication Sig Dispense Refill    aspirin 81 mg chewable tablet Take 1 Tab by mouth daily. 90 Tab 3    clopidogrel (PLAVIX) 75 mg tab Take 1 Tab by mouth daily. 30 Tab 3    traZODone (DESYREL) 50 mg tablet TAKE 1 TABLET BY MOUTH NIGHTLY. GENERIC FOR DESYREL 90 Tab 0    atorvastatin (LIPITOR) 40 mg tablet Take 1 Tab by mouth nightly. 90 Tab 0    metoprolol tartrate (LOPRESSOR) 50 mg tablet Take 1 Tab by mouth every twelve (12) hours. 180 Tab 0    NIFEdipine ER (PROCARDIA XL) 30 mg ER tablet Take 1 Tab by mouth daily. 30 Tab 0    lisinopril (PRINIVIL, ZESTRIL) 20 mg tablet Take 1 Tab by mouth daily. 30 Tab 1    nitroglycerin (NITROSTAT) 0.4 mg SL tablet 1 Tab by SubLINGual route every five (5) minutes as needed for Chest Pain. Up to 3 doses. 1 Bottle 1    MULTIVITAMIN WITH IRON PO Take 1 Tab by mouth daily.  FERROUS FUMARATE (IRON PO) Take 325 mg by mouth two (2) times a day.  Cholecalciferol, Vitamin D3, 1,000 unit cap Take 1,000 Units by mouth two (2) times a day.          Allergies   Allergen Reactions    Norvasc [Amlodipine] Swelling     Ankle swelling        Social History     Socioeconomic History    Marital status:      Spouse name: Not on file    Number of children: Not on file    Years of education: Not on file    Highest education level: Not on file   Social Needs    Financial resource strain: Not on file    Food insecurity - worry: Not on file    Food insecurity - inability: Not on file    Transportation needs - medical: Not on file   [employer] needs - non-medical: Not on file   Occupational History    Not on file   Tobacco Use    Smoking status: Never Smoker    Smokeless tobacco: Never Used   Substance and Sexual Activity    Alcohol use: No     Alcohol/week: 0.0 oz     Frequency: Never    Drug use: No    Sexual activity: Yes     Partners: Male     Birth control/protection: Surgical     Comment: tubal ligation   Other Topics Concern    Not on file   Social History Narrative    Not on file        No significant FH of premature CAD or SCD. Review of Systems    14 pt Review of Systems is negative unless otherwise mentioned in the HPI. Wt Readings from Last 3 Encounters:   12/20/18 72.2 kg (159 lb 3.2 oz)   12/14/18 71.6 kg (157 lb 12.8 oz)   12/10/18 73 kg (161 lb)     Temp Readings from Last 3 Encounters:   12/20/18 98.3 °F (36.8 °C) (Oral)   12/14/18 98.1 °F (36.7 °C)   12/08/18 97.8 °F (36.6 °C)     BP Readings from Last 3 Encounters:   12/20/18 134/90   12/14/18 178/82   12/11/18 162/84     Pulse Readings from Last 3 Encounters:   12/20/18 (!) 59   12/14/18 (!) 56   12/11/18 72       12/11/18   ECHO ADULT FOLLOW-UP OR LIMITED 12/12/2018 12/12/2018    Narrative · Estimated left ventricular ejection fraction is 56 - 60%. Visually   measured ejection fraction. Left ventricular mild concentric hypertrophy. Normal left ventricular wall motion, no regional wall motion abnormality   noted. Signed by: Thea Mchugh MD       Physical Exam:    Visit Vitals  SpO2 99%      Physical Exam   Constitutional: She is oriented to person, place, and time. She appears well-developed and well-nourished. No distress. HENT:   Head: Normocephalic and atraumatic. Eyes: EOM are normal. Pupils are equal, round, and reactive to light. Neck: No JVD present. Cardiovascular: Normal rate, regular rhythm, normal heart sounds and intact distal pulses. Exam reveals no gallop and no friction rub. No murmur heard. Pulmonary/Chest: Effort normal and breath sounds normal. No respiratory distress. She has no wheezes. She has no rales. She exhibits no tenderness. Abdominal: Soft. Bowel sounds are normal.   Musculoskeletal: She exhibits no edema or tenderness. Neurological: She is alert and oriented to person, place, and time. Skin: Skin is warm and dry. She is not diaphoretic.    Psychiatric: She has a normal mood and affect. EKG today shows: NSR, normal axis and intervals, no ST segment abnormalities    Lab Results   Component Value Date/Time    Cholesterol, total 155 12/08/2018 03:22 AM    HDL Cholesterol 42 12/08/2018 03:22 AM    LDL, calculated 98.6 12/08/2018 03:22 AM    VLDL, calculated 14.4 12/08/2018 03:22 AM    Triglyceride 72 12/08/2018 03:22 AM    CHOL/HDL Ratio 3.7 12/08/2018 03:22 AM     Impression and Plan:  Nehal Méndez is a 46 y. o. with:    1.) S/p NSTEMI, likely due to nonatherosclerotic MI- SCAD +/- vasospastic angina (OM2)  2.) Fibromuscular dysplasia noted illiac on cath  3.) HTN, avg SBP goal <140s, (not at goal but did not take any am meds yet)  4.) Dyslipidemia  5.) Normal LV function    1.) Continue BB + CCB to control angina, while nitrate responsive- not tolerating nitrates due to H/a  2.) Check BP and HR at home, lower HR limiting how much BB + CCB we can use  3.) Increase Procardia to 60 daily  4.) RTC 4-6 weeks recheck BP    I spent significant time educating Leida and her daughter on the atypical etiology of her MI. Specifically, while our medications treat the symptoms- we cannot predict or prevent a recurrent dissection/ spasm. She has to closely monitor her symptoms and if she experiences concerning persistent chest pain- I have instructed her to seek help immediately by calling 911. I went ahead and continued her other medications- such as statin as she does have some dyslipidemia and we chose to keep it to help reduce future ASCVD events. Thank you for allowing me to participate in the care of your patient, please do not hesitate to call with questions or concerns.     Follow-up Disposition: Not on File    Syeda Garcia,

## 2019-02-11 ENCOUNTER — TELEPHONE (OUTPATIENT)
Dept: CARDIOLOGY CLINIC | Age: 54
End: 2019-02-11

## 2019-02-11 NOTE — TELEPHONE ENCOUNTER
Pt called in regarding her having a headache since she started the increased dose of Nifedipine 60mg ER tablet. She states it is not a unbearable headache, but states that she wouldn't get a headache on the previous dose of 30mg.

## 2019-02-14 RX ORDER — NIFEDIPINE 30 MG/1
30 TABLET, FILM COATED, EXTENDED RELEASE ORAL DAILY
Qty: 30 TAB | Refills: 6 | Status: CANCELLED | OUTPATIENT
Start: 2019-02-14

## 2019-02-14 RX ORDER — NIFEDIPINE 30 MG/1
30 TABLET, FILM COATED, EXTENDED RELEASE ORAL DAILY
Qty: 30 TAB | Refills: 6 | Status: SHIPPED | OUTPATIENT
Start: 2019-02-14 | End: 2020-01-24

## 2019-02-14 NOTE — TELEPHONE ENCOUNTER
Information reviewed with Dr. Dax Acosta. Verbal order and read back per Lacho Scale, DO  Decrease procardia to 30mg daily. Patient is aware and verbalizes understanding. New prescription sent to pharmacy.

## 2019-03-07 ENCOUNTER — OFFICE VISIT (OUTPATIENT)
Dept: CARDIOLOGY CLINIC | Age: 54
End: 2019-03-07

## 2019-03-07 VITALS
HEART RATE: 65 BPM | OXYGEN SATURATION: 98 % | WEIGHT: 164 LBS | DIASTOLIC BLOOD PRESSURE: 82 MMHG | BODY MASS INDEX: 30.18 KG/M2 | HEIGHT: 62 IN | SYSTOLIC BLOOD PRESSURE: 134 MMHG

## 2019-03-07 DIAGNOSIS — I10 ESSENTIAL HYPERTENSION: ICD-10-CM

## 2019-03-07 DIAGNOSIS — I21.4 NSTEMI (NON-ST ELEVATED MYOCARDIAL INFARCTION) (HCC): ICD-10-CM

## 2019-03-07 DIAGNOSIS — I20.1 VASOSPASTIC ANGINA (HCC): Primary | ICD-10-CM

## 2019-03-07 RX ORDER — CARVEDILOL 12.5 MG/1
12.5 TABLET ORAL 2 TIMES DAILY
COMMUNITY
Start: 2019-02-15 | End: 2021-02-16 | Stop reason: SDUPTHER

## 2019-03-07 RX ORDER — CHLORTHALIDONE 25 MG/1
25 TABLET ORAL DAILY
COMMUNITY
Start: 2019-02-15 | End: 2019-06-05 | Stop reason: SDUPTHER

## 2019-03-07 NOTE — LETTER
NOTIFICATION RETURN TO WORK / SCHOOL 
 
3/7/2019 8:59 AM 
 
Ms. Shahram Myers [address] To Whom It May Concern: 
 
Shahram Myers is currently under the care of [address] [place] [address]. She will return to work/school on: March 11, 2019. If there are questions or concerns please have the patient contact our office. Sincerely, Shea Barakat, DO

## 2019-03-07 NOTE — PROGRESS NOTES
Carly Adkins    Chief Complaint   Patient presents with    Coronary Artery Disease     6 week follow up     Chest Pain     intermittent burning, improved    Shortness of Breath     exertion    Dizziness     [facility] f/u NSTEMI/ nonatherosclerotic MI    HPI    Carly Adkins is a 46 y.o.  perimenopausal woman with HTN, presented to ER c/o URI symptoms including CP- ended up NSTEMI, initial cath called \"normal coronaries. \" Due to continued pain, another cath few days later revealed an occluded mid/ distal OM2. Also FMD of right iliac artery noted. Her EF remained normal without any WMA. I reviewed her hospital records in detail, and have elaborated the findings:    · NSTEMI with troponin up to 7.66, initial ECG without new ischemic changes, presented with CP. Recent NSTEMI with peak troponin 13.4 with nonobstructive CAD by cath as noted below. · Nonobstructive CAD by cath (LM normal, LAD minimal luminal irregularities, left Cx normal, RCA normal) by Dr. Ashley Cuevas 2018. Possible transient thrombus vs vasospasm. · Hypertension with elevated BP on admission. · Upper respiratory infection, recent cough and congestion with low grade fever 99.5 without leukocytosis. · Normal LV function 56-60% by echo 2018 without WMA 2018. · Mild to moderate tricuspid valve regurgitation with mild pulmonary hypertension PASP 43mmHg by echo 2018. She was discharged on medical therapy (included statin, BB, CCB, plavix). She was tried on Imdur but it gave her headaches. Since the hospitalization, she is significantly better but says almost every night she can feel some chest pressure. It goes away as soon as she takes her night medications. Otherwise, no LE swelling, no SOB. She has just started walking again but hasnt returned to work. She works in home care. Since I last saw patient she went for a second opinion at [facility].   Reviewed these records and see that patient was sent to cardiac rehab. Occasions were adjusted which included stopping metoprolol and starting carvedilol and stopping nifedipine and starting chlorthalidone. He is still having some chest discomfort but does feel it is improving. He would like to go back to work and is starting cardiac rehab tomorrow. Still feels some shortness of breath with exertion otherwise no palpitations, orthopnea etc.    Past Medical History:   Diagnosis Date    Anemia     Hypertension     MI (myocardial infarction) ([address]        Past Surgical History:   Procedure Laterality Date    HX APPENDECTOMY      HX HEART CATHETERIZATION         Current Outpatient Medications   Medication Sig Dispense Refill    NIFEdipine ER (ADALAT CC) 30 mg ER tablet Take 1 Tab by mouth daily. 30 Tab 6    aspirin 81 mg chewable tablet Take 1 Tab by mouth daily. 90 Tab 3    clopidogrel (PLAVIX) 75 mg tab Take 1 Tab by mouth daily. 30 Tab 3    traZODone (DESYREL) 50 mg tablet TAKE 1 TABLET BY MOUTH NIGHTLY. GENERIC FOR DESYREL 90 Tab 0    atorvastatin (LIPITOR) 40 mg tablet Take 1 Tab by mouth nightly. 90 Tab 0    lisinopril (PRINIVIL, ZESTRIL) 20 mg tablet Take 1 Tab by mouth daily. 30 Tab 1    nitroglycerin (NITROSTAT) 0.4 mg SL tablet 1 Tab by SubLINGual route every five (5) minutes as needed for Chest Pain. Up to 3 doses. 1 Bottle 1    MULTIVITAMIN WITH IRON PO Take 1 Tab by mouth daily.  FERROUS FUMARATE (IRON PO) Take 325 mg by mouth two (2) times a day.  Cholecalciferol, Vitamin D3, 1,000 unit cap Take 1,000 Units by mouth two (2) times a day.  chlorthalidone (HYGROTEN) 25 mg tablet Take 25 mg by mouth daily.  carvedilol (COREG) 12.5 mg tablet Take 12.5 mg by mouth two (2) times a day.          Allergies   Allergen Reactions    Norvasc [Amlodipine] Swelling     Ankle swelling        Social History     Socioeconomic History    Marital status:      Spouse name: Not on file    Number of children: Not on file    Years of education: Not on file    Highest education level: Not on file   Social Needs    Financial resource strain: Not on file    Food insecurity - worry: Not on file    Food insecurity - inability: Not on file    Transportation needs - medical: Not on file   [employer] needs - non-medical: Not on file   Occupational History    Not on file   Tobacco Use    Smoking status: Never Smoker    Smokeless tobacco: Never Used   Substance and Sexual Activity    Alcohol use: No     Alcohol/week: 0.0 oz     Frequency: Never    Drug use: No    Sexual activity: Yes     Partners: Male     Birth control/protection: Surgical     Comment: tubal ligation   Other Topics Concern    Not on file   Social History Narrative    Not on file        No significant FH of premature CAD or SCD. Review of Systems    14 pt Review of Systems is negative unless otherwise mentioned in the HPI. Wt Readings from Last 3 Encounters:   03/07/19 74.4 kg (164 lb)   01/24/19 73.5 kg (162 lb)   12/20/18 72.2 kg (159 lb 3.2 oz)     Temp Readings from Last 3 Encounters:   12/20/18 98.3 °F (36.8 °C) (Oral)   12/14/18 98.1 °F (36.7 °C)   12/08/18 97.8 °F (36.6 °C)     BP Readings from Last 3 Encounters:   03/07/19 134/82   01/24/19 (!) 170/110   12/20/18 134/90     Pulse Readings from Last 3 Encounters:   03/07/19 65   01/24/19 (!) 55   12/20/18 (!) 59       12/11/18   ECHO ADULT FOLLOW-UP OR LIMITED 12/12/2018 12/12/2018    Narrative · Estimated left ventricular ejection fraction is 56 - 60%. Visually   measured ejection fraction. Left ventricular mild concentric hypertrophy. Normal left ventricular wall motion, no regional wall motion abnormality   noted. Signed by: Tiera Sandoval MD       Physical Exam:    Visit Vitals  /82   Pulse 65   Ht 5' 2\" (1.575 m)   Wt 74.4 kg (164 lb)   SpO2 98%   BMI 30.00 kg/m²      Physical Exam   Constitutional: She is oriented to person, place, and time.  She appears well-developed and well-nourished. No distress. HENT:   Head: Normocephalic and atraumatic. Eyes: EOM are normal. Pupils are equal, round, and reactive to light. Neck: No JVD present. Cardiovascular: Normal rate, regular rhythm, normal heart sounds and intact distal pulses. Exam reveals no gallop and no friction rub. No murmur heard. Pulmonary/Chest: Effort normal and breath sounds normal. No respiratory distress. She has no wheezes. She has no rales. She exhibits no tenderness. Abdominal: Soft. Bowel sounds are normal.   Musculoskeletal: She exhibits no edema or tenderness. Neurological: She is alert and oriented to person, place, and time. Skin: Skin is warm and dry. She is not diaphoretic. Psychiatric: She has a normal mood and affect. Last EKG: NSR, normal axis and intervals, no ST segment abnormalities    Lab Results   Component Value Date/Time    Cholesterol, total 155 12/08/2018 03:22 AM    HDL Cholesterol 42 12/08/2018 03:22 AM    LDL, calculated 98.6 12/08/2018 03:22 AM    VLDL, calculated 14.4 12/08/2018 03:22 AM    Triglyceride 72 12/08/2018 03:22 AM    CHOL/HDL Ratio 3.7 12/08/2018 03:22 AM     Impression and Plan:  Nando Andino is a 46 y. o. with:    1.) S/p NSTEMI, likely due to nonatherosclerotic MI- SCAD +/- vasospastic angina (OM2)  2.) Fibromuscular dysplasia noted iliac on cath  3.) HTN, avg SBP goal <140s, (not at goal but did not take any am meds yet)  4.) Dyslipidemia  5.) Normal LV function    1.) Continue BB to control angina, while nitrate responsive- not tolerating nitrates due to H/a  2.) Check BP and HR at home, lower HR limiting how much BB we can use  3.) Patient has follow up appt at [address] 3 weeks, then can see me back 2 months, note given to go back to work, starting cardiac rehab, and asked her to call me sooner if need refills etc    I spent significant time educating Inesia again regarding the atypical etiology of her MI.  Specifically, while our medications treat the symptoms- we cannot predict or prevent a recurrent dissection/ spasm (it can also happen in a different artery). She has to closely monitor her symptoms and if she experiences concerning persistent chest pain- I have instructed her to seek help immediately by calling 911. I made no major changes to her medications at this time. Thank you for allowing me to participate in the care of your patient, please do not hesitate to call with questions or concerns. Follow-up Disposition:  Return in about 2 months (around 5/7/2019).     Zacarias Kim, DO

## 2019-03-07 NOTE — PROGRESS NOTES
Stephen Leung presents today for   Chief Complaint   Patient presents with    Coronary Artery Disease     6 week follow up     Chest Pain     intermittent burning, improved    Shortness of Breath     exertion    Dizziness     lightheaded       Stephen Leung preferred language for health care discussion is english/other. Is someone accompanying this pt? no    Is the patient using any DME equipment during [address]? no    Depression Screening:  3 most recent PHQ Screens 2/16/2018   Little interest or pleasure in doing things Not at all   Feeling down, depressed, irritable, or hopeless Not at all   Total Score PHQ 2 0       Learning Assessment:  Learning Assessment 3/7/2019   PRIMARY LEARNER Patient   HIGHEST LEVEL OF EDUCATION - PRIMARY LEARNER  -   [address] PRIMARY LEARNER -   [facility]    NEED -   [facility] [address] [facility]   ANSWERED BY Patient   RELATIONSHIP SELF       Abuse Screening:  Abuse Screening Questionnaire 10/25/2017   Do you ever feel afraid of your partner? N   Are you in a relationship with someone who physically or mentally threatens you? N   Is it safe for you to go home? Y       Fall Risk  No flowsheet data found. Pt currently taking Anticoagulant therapy? ASA and Plavix    Coordination of Care:  1. Have you been to the ER, urgent care clinic since your last visit? Hospitalized since your last visit? no    2. Have you seen or consulted any other health care providers outside of the [address] [place] [address] since your last visit? Include any pap smears or colon screening.  no

## 2019-03-18 DIAGNOSIS — G47.9 SLEEP TROUBLE: ICD-10-CM

## 2019-03-19 RX ORDER — TRAZODONE HYDROCHLORIDE 50 MG/1
TABLET ORAL
Qty: 90 TAB | Refills: 0 | Status: SHIPPED | OUTPATIENT
Start: 2019-03-19 | End: 2019-06-16 | Stop reason: SDUPTHER

## 2019-03-19 NOTE — TELEPHONE ENCOUNTER
This pharmacy faxed over request for the following prescriptions to be filled:    Medication requested : .   Requested Prescriptions     Pending Prescriptions Disp Refills    traZODone (DESYREL) 50 mg tablet [Pharmacy Med Name: traZODone 50 MG TABLET] 90 Tab 0     Sig: TAKE ONE TABLET BY MOUTH NIGHTLY **GENERIC FOR: FOR [facility]     PCP: [address] or Print: [place]   Mail order or Local pharmacy [address]    Scheduled appointment if not seen by current providers in office:  LOV 12/20/2019 f/u 4/29/2019

## 2019-04-12 RX ORDER — METOPROLOL TARTRATE 50 MG/1
TABLET ORAL
Qty: 180 TAB | Refills: 0 | OUTPATIENT
Start: 2019-04-12

## 2019-05-14 DIAGNOSIS — I10 ESSENTIAL HYPERTENSION: ICD-10-CM

## 2019-05-14 RX ORDER — LISINOPRIL 20 MG/1
20 TABLET ORAL DAILY
Qty: 90 TAB | Refills: 0 | Status: SHIPPED | OUTPATIENT
Start: 2019-05-14 | End: 2021-02-16 | Stop reason: SDUPTHER

## 2019-05-14 RX ORDER — ATORVASTATIN CALCIUM 40 MG/1
40 TABLET, FILM COATED ORAL
Qty: 90 TAB | Refills: 0 | Status: SHIPPED | OUTPATIENT
Start: 2019-05-14 | End: 2019-08-09 | Stop reason: SDUPTHER

## 2019-05-14 NOTE — TELEPHONE ENCOUNTER
This patient contacted office for the following prescriptions to be filled: pt is completely out    Medication requested :   Requested Prescriptions     Pending Prescriptions Disp Refills    atorvastatin (LIPITOR) 40 mg tablet 90 Tab 0     Sig: Take 1 Tab by mouth nightly. Indications: treatment to slow progression of coronary artery disease    lisinopril (PRINIVIL, ZESTRIL) 20 mg tablet 30 Tab 1     Sig: Take 1 Tab by mouth daily.      PCP: [facility]  Pharmacy or Print: [facility]  Mail order or Local pharmacy [phone]    Scheduled appointment if not seen by current providers in office: lov 12/20/19 juan 6/4/19

## 2019-05-16 RX ORDER — CLOPIDOGREL BISULFATE 75 MG/1
75 TABLET ORAL DAILY
Qty: 30 TAB | Refills: 0 | Status: SHIPPED | OUTPATIENT
Start: 2019-05-16 | End: 2019-06-17 | Stop reason: SDUPTHER

## 2019-06-02 ENCOUNTER — APPOINTMENT (OUTPATIENT)
Dept: GENERAL RADIOLOGY | Age: 54
End: 2019-06-02
Attending: PHYSICIAN ASSISTANT
Payer: OTHER GOVERNMENT

## 2019-06-02 ENCOUNTER — HOSPITAL ENCOUNTER (EMERGENCY)
Age: 54
Discharge: HOME | End: 2019-06-02
Attending: EMERGENCY MEDICINE
Payer: OTHER GOVERNMENT

## 2019-06-02 VITALS
HEIGHT: 61 IN | WEIGHT: 162 LBS | HEART RATE: 79 BPM | DIASTOLIC BLOOD PRESSURE: 73 MMHG | BODY MASS INDEX: 30.58 KG/M2 | RESPIRATION RATE: 20 BRPM | SYSTOLIC BLOOD PRESSURE: 113 MMHG | OXYGEN SATURATION: 96 % | TEMPERATURE: 99.3 F

## 2019-06-02 DIAGNOSIS — J20.9 ACUTE BRONCHITIS, UNSPECIFIED ORGANISM: Primary | ICD-10-CM

## 2019-06-02 LAB
ANION GAP SERPL CALC-SCNC: 8 MMOL/L (ref 3–18)
ATRIAL RATE: 74 BPM
BASOPHILS # BLD: 0 K/UL (ref 0–0.1)
BASOPHILS NFR BLD: 0 % (ref 0–2)
BUN SERPL-MCNC: 14 MG/DL (ref 7–18)
BUN/CREAT SERPL: 14 (ref 12–20)
CALCIUM SERPL-MCNC: 9 MG/DL (ref 8.5–10.1)
CALCULATED P AXIS, ECG09: 62 DEGREES
CALCULATED R AXIS, ECG10: 48 DEGREES
CALCULATED T AXIS, ECG11: 33 DEGREES
CHLORIDE SERPL-SCNC: 102 MMOL/L (ref 100–108)
CO2 SERPL-SCNC: 29 MMOL/L (ref 21–32)
CREAT SERPL-MCNC: 0.98 MG/DL (ref 0.6–1.3)
DIAGNOSIS, 93000: NORMAL
DIFFERENTIAL METHOD BLD: ABNORMAL
EOSINOPHIL # BLD: 0.2 K/UL (ref 0–0.4)
EOSINOPHIL NFR BLD: 1 % (ref 0–5)
ERYTHROCYTE [DISTWIDTH] IN BLOOD BY AUTOMATED COUNT: 13.7 % (ref 11.6–14.5)
GLUCOSE SERPL-MCNC: 101 MG/DL (ref 74–99)
HCT VFR BLD AUTO: 32 % (ref 35–45)
HGB BLD-MCNC: 10.8 G/DL (ref 12–16)
LYMPHOCYTES # BLD: 2.7 K/UL (ref 0.9–3.6)
LYMPHOCYTES NFR BLD: 20 % (ref 21–52)
MCH RBC QN AUTO: 29.7 PG (ref 24–34)
MCHC RBC AUTO-ENTMCNC: 33.8 G/DL (ref 31–37)
MCV RBC AUTO: 87.9 FL (ref 74–97)
MONOCYTES # BLD: 1.2 K/UL (ref 0.05–1.2)
MONOCYTES NFR BLD: 9 % (ref 3–10)
NEUTS SEG # BLD: 9.1 K/UL (ref 1.8–8)
NEUTS SEG NFR BLD: 70 % (ref 40–73)
P-R INTERVAL, ECG05: 172 MS
PLATELET # BLD AUTO: 354 K/UL (ref 135–420)
PMV BLD AUTO: 10.3 FL (ref 9.2–11.8)
POTASSIUM SERPL-SCNC: 3.6 MMOL/L (ref 3.5–5.5)
Q-T INTERVAL, ECG07: 392 MS
QRS DURATION, ECG06: 68 MS
QTC CALCULATION (BEZET), ECG08: 435 MS
RBC # BLD AUTO: 3.64 M/UL (ref 4.2–5.3)
SODIUM SERPL-SCNC: 139 MMOL/L (ref 136–145)
TROPONIN I SERPL-MCNC: <0.02 NG/ML (ref 0–0.04)
VENTRICULAR RATE, ECG03: 74 BPM
WBC # BLD AUTO: 13.1 K/UL (ref 4.6–13.2)

## 2019-06-02 PROCEDURE — 74011000250 HC RX REV CODE- 250: Performed by: PHYSICIAN ASSISTANT

## 2019-06-02 PROCEDURE — 93005 ELECTROCARDIOGRAM TRACING: CPT

## 2019-06-02 PROCEDURE — 71046 X-RAY EXAM CHEST 2 VIEWS: CPT

## 2019-06-02 PROCEDURE — 77030029684 HC NEB SM VOL KT MONA -A

## 2019-06-02 PROCEDURE — 85025 COMPLETE CBC W/AUTO DIFF WBC: CPT

## 2019-06-02 PROCEDURE — 80048 BASIC METABOLIC PNL TOTAL CA: CPT

## 2019-06-02 PROCEDURE — 99283 EMERGENCY DEPT VISIT LOW MDM: CPT

## 2019-06-02 PROCEDURE — 84484 ASSAY OF TROPONIN QUANT: CPT

## 2019-06-02 PROCEDURE — 94640 AIRWAY INHALATION TREATMENT: CPT

## 2019-06-02 RX ORDER — PREDNISONE 10 MG/1
TABLET ORAL
Qty: 21 TAB | Refills: 0 | Status: SHIPPED | OUTPATIENT
Start: 2019-06-02 | End: 2020-01-24 | Stop reason: ALTCHOICE

## 2019-06-02 RX ORDER — AZITHROMYCIN 250 MG/1
TABLET, FILM COATED ORAL
Qty: 6 TAB | Refills: 0 | Status: SHIPPED | OUTPATIENT
Start: 2019-06-02 | End: 2020-01-24 | Stop reason: ALTCHOICE

## 2019-06-02 RX ORDER — IPRATROPIUM BROMIDE AND ALBUTEROL SULFATE 2.5; .5 MG/3ML; MG/3ML
3 SOLUTION RESPIRATORY (INHALATION)
Status: COMPLETED | OUTPATIENT
Start: 2019-06-02 | End: 2019-06-02

## 2019-06-02 RX ORDER — ALBUTEROL SULFATE 90 UG/1
2 AEROSOL, METERED RESPIRATORY (INHALATION)
Qty: 1 INHALER | Refills: 0 | Status: SHIPPED | OUTPATIENT
Start: 2019-06-02 | End: 2021-05-11 | Stop reason: SDUPTHER

## 2019-06-02 RX ADMIN — IPRATROPIUM BROMIDE AND ALBUTEROL SULFATE 3 ML: .5; 3 SOLUTION RESPIRATORY (INHALATION) at 12:14

## 2019-06-02 NOTE — ED TRIAGE NOTES
Pt c/o cough and body aches since last week. Was seen at Urgent care last Saturday and was given med for cough that doesn't help.

## 2019-06-02 NOTE — ED PROVIDER NOTES
EMERGENCY DEPARTMENT HISTORY AND PHYSICAL EXAM    Date: 6/2/2019  Patient Name: Rosa Jean    History of Presenting Illness     Chief Complaint   Patient presents with    Cough    Generalized Body Aches         History Provided By:patient     Chief Complaint: cough and congestion   Duration: 1 week   Timing:acute  Location: chest, upper resp  Quality:tightness, wheezy  Severity: moderate  Modifying Factors: OTC meds have not helped  Associated Symptoms:cough, congestion, chest tightness, wheezing body aches and chills      Additional History (Context): Rosa Jean is a 46 y.o. female with PMH htn, anemia, and MI who presents with c/o productive cough, chest tightness, wheezing, body aches and chills x 1  Week. Pt states she was seen at an urgent care clinic and prescribed cough meds with no relief in sx. Denies chest pain and SOB. No other complaints. PCP: Josh Larry MD    Current Outpatient Medications   Medication Sig Dispense Refill    clopidogrel (PLAVIX) 75 mg tab Take 1 Tab by mouth daily. 30 Tab 0    atorvastatin (LIPITOR) 40 mg tablet Take 1 Tab by mouth nightly. Indications: treatment to slow progression of coronary artery disease 90 Tab 0    lisinopril (PRINIVIL, ZESTRIL) 20 mg tablet Take 1 Tab by mouth daily. 90 Tab 0    traZODone (DESYREL) 50 mg tablet TAKE ONE TABLET BY MOUTH NIGHTLY **GENERIC FOR: FOR DESYREL 90 Tab 0    chlorthalidone (HYGROTEN) 25 mg tablet Take 25 mg by mouth daily.  carvedilol (COREG) 12.5 mg tablet Take 12.5 mg by mouth two (2) times a day.  NIFEdipine ER (ADALAT CC) 30 mg ER tablet Take 1 Tab by mouth daily. 30 Tab 6    aspirin 81 mg chewable tablet Take 1 Tab by mouth daily. 90 Tab 3    FERROUS FUMARATE (IRON PO) Take 325 mg by mouth two (2) times a day.  Cholecalciferol, Vitamin D3, 1,000 unit cap Take 1,000 Units by mouth two (2) times a day.       nitroglycerin (NITROSTAT) 0.4 mg SL tablet 1 Tab by SubLINGual route every five (5) minutes as needed for Chest Pain. Up to 3 doses. 1 Bottle 1       Past History     Past Medical History:  Past Medical History:   Diagnosis Date    Anemia     Hypertension     [facility] infarction) [facility] [address] [facility]     NSTEMI (non-ST elevated myocardial infarction) ([facility]     12/2018       Past Surgical History:  Past Surgical History:   Procedure Laterality Date    HX APPENDECTOMY      HX HEART CATHETERIZATION         Family History:  Family History   Problem Relation Age of Onset    Breast Cancer Mother     Hypertension Mother     Diabetes Mother     Heart Failure Mother     Breast Cancer Sister     Breast Cancer Sister        Social History:  Social History     Tobacco Use    Smoking status: Never Smoker    Smokeless tobacco: Never Used   Substance Use Topics    Alcohol use: No     Alcohol/week: 0.0 oz     Frequency: Never    Drug use: No       Allergies: Allergies   Allergen Reactions    Norvasc [Amlodipine] Swelling     Ankle swelling          Review of Systems   Review of Systems   Constitutional: Positive for chills. Negative for fever. HENT: Positive for congestion. Negative for ear pain and rhinorrhea. Eyes: Negative. Negative for pain and redness. Respiratory: Positive for cough and chest tightness. Negative for shortness of breath, wheezing and stridor. Cardiovascular: Negative. Negative for chest pain and leg swelling. Gastrointestinal: Negative. Negative for abdominal pain, constipation, diarrhea, nausea and vomiting. Genitourinary: Negative. Negative for dysuria and frequency. Musculoskeletal: Positive for myalgias. Negative for back pain and neck pain. Skin: Negative. Negative for rash and wound. Neurological: Negative. Negative for dizziness, seizures, syncope and headaches. All other systems reviewed and are negative.     All Other Systems Negative  Physical Exam     Vitals:    06/02/19 1127   BP: 113/73   Pulse: 79   Resp: 20   Temp: 99.3 °F (37.4 °C)   SpO2: 96%   Weight: 73.5 kg (162 lb)   Height: 5' 1\" (1.549 m)     Physical Exam   Constitutional: She is oriented to person, place, and time. She appears well-developed and well-nourished. She appears distressed. Mildly distressed   HENT:   Head: Normocephalic and atraumatic. Right Ear: External ear normal.   Left Ear: External ear normal.   Nose: Nose normal.   Mouth/Throat: Oropharynx is clear and moist. No oropharyngeal exudate. Eyes: Conjunctivae are normal. Right eye exhibits no discharge. Left eye exhibits no discharge. No scleral icterus. Neck: Normal range of motion. Neck supple. Cardiovascular: Normal rate, regular rhythm and normal heart sounds. Exam reveals no gallop and no friction rub. No murmur heard. Pulmonary/Chest: Effort normal and breath sounds normal. No stridor. No respiratory distress. She has no wheezes. She has no rales. coughing during the exam   Musculoskeletal: Normal range of motion. Neurological: She is alert and oriented to person, place, and time. Coordination normal.   Gait is steady. Able to ambulate without difficulty. Skin: Skin is warm and dry. No rash noted. She is not diaphoretic. No erythema. Psychiatric: She has a normal mood and affect. Her behavior is normal. Thought content normal.   Nursing note and vitals reviewed.              Diagnostic Study Results     Labs -     Recent Results (from the past 12 hour(s))   EKG, 12 LEAD, INITIAL    Collection Time: 06/02/19 12:13 PM   Result Value Ref Range    Ventricular Rate 74 BPM    Atrial Rate 74 BPM    P-R Interval 172 ms    QRS Duration 68 ms    Q-T Interval 392 ms    QTC Calculation (Bezet) 435 ms    Calculated P Axis 62 degrees    Calculated R Axis 48 degrees    Calculated T Axis 33 degrees    Diagnosis       Normal sinus rhythm  Possible Left atrial enlargement  Borderline ECG  When compared with ECG of 13-DEC-2018 07:16,  Nonspecific T wave abnormality has replaced inverted T waves in Inferior   leads CBC WITH AUTOMATED DIFF    Collection Time: 06/02/19 12:15 PM   Result Value Ref Range    WBC 13.1 4.6 - 13.2 K/uL    RBC 3.64 (L) 4.20 - 5.30 M/uL    HGB 10.8 (L) 12.0 - 16.0 g/dL    HCT 32.0 (L) 35.0 - 45.0 %    MCV 87.9 74.0 - 97.0 FL    MCH 29.7 24.0 - 34.0 PG    MCHC 33.8 31.0 - 37.0 g/dL    RDW 13.7 11.6 - 14.5 %    PLATELET 889 197 - 177 K/uL    MPV 10.3 9.2 - 11.8 FL    NEUTROPHILS 70 40 - 73 %    LYMPHOCYTES 20 (L) 21 - 52 %    MONOCYTES 9 3 - 10 %    EOSINOPHILS 1 0 - 5 %    BASOPHILS 0 0 - 2 %    ABS. NEUTROPHILS 9.1 (H) 1.8 - 8.0 K/UL    ABS. LYMPHOCYTES 2.7 0.9 - 3.6 K/UL    ABS. MONOCYTES 1.2 0.05 - 1.2 K/UL    ABS. EOSINOPHILS 0.2 0.0 - 0.4 K/UL    ABS. BASOPHILS 0.0 0.0 - 0.1 K/UL    DF AUTOMATED     METABOLIC PANEL, BASIC    Collection Time: 06/02/19 12:15 PM   Result Value Ref Range    Sodium 139 136 - 145 mmol/L    Potassium 3.6 3.5 - 5.5 mmol/L    Chloride 102 100 - 108 mmol/L    CO2 29 21 - 32 mmol/L    Anion gap 8 3.0 - 18 mmol/L    Glucose 101 (H) 74 - 99 mg/dL    BUN 14 7.0 - 18 MG/DL    Creatinine 0.98 0.6 - 1.3 MG/DL    BUN/Creatinine ratio 14 12 - 20      GFR est AA >60 >60 ml/min/1.73m2    GFR est non-AA 59 (L) >60 ml/min/1.73m2    Calcium 9.0 8.5 - 10.1 MG/DL   TROPONIN I    Collection Time: 06/02/19 12:15 PM   Result Value Ref Range    Troponin-I, QT <0.02 0.0 - 0.045 NG/ML       Radiologic Studies -   XR CHEST PA LAT    (Results Pending)   streaky opacities to the RLL, questionable infiltrates  CT Results  (Last 48 hours)    None        CXR Results  (Last 48 hours)    None            Medical Decision Making   I am the first provider for this patient. I reviewed the vital signs, available nursing notes, past medical history, past surgical history, family history and social history. Vital Signs-Reviewed the patient's vital signs. Records Reviewed: Kae Stoddard PA-C     Procedures:  Procedures    Provider Notes (Medical Decision Making):  Impression:  Acte bronchitis    EKG and labs unremarkable,. Chest x-ray shows questionable infiltrates to the RLL. Will plan to d/c with zpack, prednisone and albuterol with close PCP follow-up. Pt agrees. Kae Stoddard PA-C 12:45 PM     MED RECONCILIATION:  No current facility-administered medications for this encounter. Current Outpatient Medications   Medication Sig    clopidogrel (PLAVIX) 75 mg tab Take 1 Tab by mouth daily.  atorvastatin (LIPITOR) 40 mg tablet Take 1 Tab by mouth nightly. Indications: treatment to slow progression of coronary artery disease    lisinopril (PRINIVIL, ZESTRIL) 20 mg tablet Take 1 Tab by mouth daily.  traZODone (DESYREL) 50 mg tablet TAKE ONE TABLET BY MOUTH NIGHTLY **GENERIC FOR: FOR DESYREL    chlorthalidone (HYGROTEN) 25 mg tablet Take 25 mg by mouth daily.  carvedilol (COREG) 12.5 mg tablet Take 12.5 mg by mouth two (2) times a day.  NIFEdipine ER (ADALAT CC) 30 mg ER tablet Take 1 Tab by mouth daily.  aspirin 81 mg chewable tablet Take 1 Tab by mouth daily.  FERROUS FUMARATE (IRON PO) Take 325 mg by mouth two (2) times a day.  Cholecalciferol, Vitamin D3, 1,000 unit cap Take 1,000 Units by mouth two (2) times a day.  nitroglycerin (NITROSTAT) 0.4 mg SL tablet 1 Tab by SubLINGual route every five (5) minutes as needed for Chest Pain. Up to 3 doses. Disposition:  D/c    DISCHARGE NOTE:   Patient is stable for discharge at this time. Rx for zpack, prednisone and albuterol given. Rest and follow-up with PCP this week. Return to the ED immediately for any new or worsening sx.   Kae Mcdowell PA-C 12:45 PM     Follow-up Information     Follow up With Specialties Details Why Contact Info    Lizbeth Hirsch MD [facility] Schedule an appointment as soon as possible for a visit in 1 week  [address] [place] [address]      [id number] [place] [address] EMERGENCY DEPT Emergency Medicine  As needed, If symptoms worsen [address] [id number] [phone]              Diagnosis     Clinical Impression:   1.  Acute bronchitis, unspecified organism

## 2019-06-02 NOTE — DISCHARGE INSTRUCTIONS
Patient Education        Bronchitis: Care Instructions  Your Care Instructions    Bronchitis is inflammation of the bronchial tubes, which carry air to the lungs. The tubes swell and produce mucus, or phlegm. The mucus and inflamed bronchial tubes make you cough. You may have trouble breathing. Most cases of bronchitis are caused by viruses like those that cause colds. Antibiotics usually do not help and they may be harmful. Bronchitis usually develops rapidly and lasts about 2 to 3 weeks in otherwise healthy people. Follow-up care is a key part of your treatment and safety. Be sure to make and go to all appointments, and call your doctor if you are having problems. It's also a good idea to know your test results and keep a list of the medicines you take. How can you care for yourself at home? · Take all medicines exactly as prescribed. Call your doctor if you think you are having a problem with your medicine. · Get some extra rest.  · Take an over-the-counter pain medicine, such as acetaminophen (Tylenol), ibuprofen (Advil, Motrin), or naproxen (Aleve) to reduce fever and relieve body aches. Read and follow all instructions on the label. · Do not take two or more pain medicines at the same time unless the doctor told you to. Many pain medicines have acetaminophen, which is Tylenol. Too much acetaminophen (Tylenol) can be harmful. · Take an over-the-counter cough medicine that contains dextromethorphan to help quiet a dry, hacking cough so that you can sleep. Avoid cough medicines that have more than one active ingredient. Read and follow all instructions on the label. · Breathe moist air from a humidifier, hot shower, or sink filled with hot water. The heat and moisture will thin mucus so you can cough it out. · Do not smoke. Smoking can make bronchitis worse. If you need help quitting, talk to your doctor about stop-smoking programs and medicines.  These can increase your chances of quitting for good.  When should you call for help? Call 911 anytime you think you may need emergency care. For example, call if:    · You have severe trouble breathing.    Call your doctor now or seek immediate medical care if:    · You have new or worse trouble breathing.     · You cough up dark brown or bloody mucus (sputum).     · You have a new or higher fever.     · You have a new rash.    Watch closely for changes in your health, and be sure to contact your doctor if:    · You cough more deeply or more often, especially if you notice more mucus or a change in the color of your mucus.     · You are not getting better as expected. Where can you learn more? Go to http://kings-junito.info/. Enter H333 in the search box to learn more about \"Bronchitis: Care Instructions. \"  Current as of: September 5, 2018  Content Version: 11.9  © 7530-5749 [employer]. Care instructions adapted under license by [employer] (which disclaims liability or warranty for this information). If you have questions about a medical condition or this instruction, always ask your healthcare professional. [address] any warranty or liability for your use of this information. ClickFox Activation    Thank you for requesting access to ClickFox. Please follow the instructions below to securely access and download your online medical record. ClickFox allows you to send messages to your doctor, view your test results, renew your prescriptions, schedule appointments, and more. How Do I Sign Up? 1. In your internet browser, go to [email or web address]  2. Click on the First Time User? Click Here link in the Sign In box. You will be redirect to the New Member Sign Up page. 3. Enter your ClickFox Access Code exactly as it appears below. You will not need to use this code after youve completed the sign-up process.  If you do not sign up before the expiration date, you must request a new code.    BetaStudios Access Code: [email or web address]  Expires: 2019 11:30 AM (This is the date your BetaStudios access code will )    4. Enter the last four digits of your Social Security Number (xxxx) and Date of Birth (mm/dd/yyyy) as indicated and click Submit. You will be taken to the next sign-up page. 5. Create a PathSourcet ID. This will be your BetaStudios login ID and cannot be changed, so think of one that is secure and easy to remember. 6. Create a BetaStudios password. You can change your password at any time. 7. Enter your Password Reset Question and Answer. This can be used at a later time if you forget your password. 8. Enter your e-mail address. You will receive e-mail notification when new information is available in [address]. 9. Click Sign Up. You can now view and download portions of your medical record. 10. Click the Download Summary menu link to download a portable copy of your medical information. Additional Information    If you have questions, please visit the Frequently Asked Questions section of the BetaStudios website at [email or web address] Remember, BetaStudios is NOT to be used for urgent needs. For medical emergencies, dial 911. Complete all medications as prescribed. Follow-up with primary care doctor in 1 week. Return to the ED immediately for any new or worsening symptoms.

## 2019-06-05 ENCOUNTER — OFFICE VISIT (OUTPATIENT)
Dept: FAMILY MEDICINE CLINIC | Age: 54
End: 2019-06-05

## 2019-06-05 ENCOUNTER — HOSPITAL ENCOUNTER (OUTPATIENT)
Dept: LAB | Age: 54
Discharge: HOME | End: 2019-06-05
Payer: OTHER GOVERNMENT

## 2019-06-05 VITALS
OXYGEN SATURATION: 96 % | WEIGHT: 160 LBS | DIASTOLIC BLOOD PRESSURE: 80 MMHG | HEART RATE: 65 BPM | HEIGHT: 61 IN | BODY MASS INDEX: 30.21 KG/M2 | TEMPERATURE: 97.9 F | SYSTOLIC BLOOD PRESSURE: 116 MMHG | RESPIRATION RATE: 16 BRPM

## 2019-06-05 DIAGNOSIS — J04.0 LARYNGITIS: ICD-10-CM

## 2019-06-05 DIAGNOSIS — J40 BRONCHITIS: Primary | ICD-10-CM

## 2019-06-05 DIAGNOSIS — I25.10 CORONARY ARTERY DISEASE INVOLVING NATIVE CORONARY ARTERY OF NATIVE HEART WITHOUT ANGINA PECTORIS: ICD-10-CM

## 2019-06-05 DIAGNOSIS — I25.119 CORONARY ARTERY DISEASE INVOLVING NATIVE CORONARY ARTERY OF NATIVE HEART WITH ANGINA PECTORIS (HCC): ICD-10-CM

## 2019-06-05 DIAGNOSIS — R73.03 PREDIABETES: ICD-10-CM

## 2019-06-05 DIAGNOSIS — Z12.11 SCREENING FOR COLON CANCER: ICD-10-CM

## 2019-06-05 DIAGNOSIS — I10 ESSENTIAL HYPERTENSION: ICD-10-CM

## 2019-06-05 DIAGNOSIS — D50.8 OTHER IRON DEFICIENCY ANEMIA: ICD-10-CM

## 2019-06-05 PROBLEM — D64.9 ABSOLUTE ANEMIA: Status: ACTIVE | Noted: 2019-06-05

## 2019-06-05 LAB
ALBUMIN SERPL-MCNC: 4 G/DL (ref 3.4–5)
ALBUMIN/GLOB SERPL: 1.1 {RATIO} (ref 0.8–1.7)
ALP SERPL-CCNC: 135 U/L (ref 45–117)
ALT SERPL-CCNC: 40 U/L (ref 13–56)
ANION GAP SERPL CALC-SCNC: 8 MMOL/L (ref 3–18)
AST SERPL-CCNC: 16 U/L (ref 15–37)
BASOPHILS # BLD: 0 K/UL (ref 0–0.1)
BASOPHILS NFR BLD: 0 % (ref 0–2)
BILIRUB SERPL-MCNC: 0.4 MG/DL (ref 0.2–1)
BUN SERPL-MCNC: 26 MG/DL (ref 7–18)
BUN/CREAT SERPL: 25 (ref 12–20)
CALCIUM SERPL-MCNC: 9.7 MG/DL (ref 8.5–10.1)
CHLORIDE SERPL-SCNC: 102 MMOL/L (ref 100–108)
CHOLEST SERPL-MCNC: 131 MG/DL
CO2 SERPL-SCNC: 28 MMOL/L (ref 21–32)
CREAT SERPL-MCNC: 1.06 MG/DL (ref 0.6–1.3)
DIFFERENTIAL METHOD BLD: ABNORMAL
EOSINOPHIL # BLD: 0 K/UL (ref 0–0.4)
EOSINOPHIL NFR BLD: 0 % (ref 0–5)
ERYTHROCYTE [DISTWIDTH] IN BLOOD BY AUTOMATED COUNT: 14.6 % (ref 11.6–14.5)
GLOBULIN SER CALC-MCNC: 3.7 G/DL (ref 2–4)
GLUCOSE SERPL-MCNC: 106 MG/DL (ref 74–99)
HCT VFR BLD AUTO: 32.9 % (ref 35–45)
HDLC SERPL-MCNC: 42 MG/DL (ref 40–60)
HDLC SERPL: 3.1 {RATIO} (ref 0–5)
HGB BLD-MCNC: 11 G/DL (ref 12–16)
LDLC SERPL CALC-MCNC: 68.8 MG/DL (ref 0–100)
LIPID PROFILE,FLP: NORMAL
LYMPHOCYTES # BLD: 3.3 K/UL (ref 0.9–3.6)
LYMPHOCYTES NFR BLD: 16 % (ref 21–52)
MCH RBC QN AUTO: 29.2 PG (ref 24–34)
MCHC RBC AUTO-ENTMCNC: 33.4 G/DL (ref 31–37)
MCV RBC AUTO: 87.3 FL (ref 74–97)
MONOCYTES # BLD: 1.5 K/UL (ref 0.05–1.2)
MONOCYTES NFR BLD: 7 % (ref 3–10)
NEUTS SEG # BLD: 16.1 K/UL (ref 1.8–8)
NEUTS SEG NFR BLD: 77 % (ref 40–73)
PLATELET # BLD AUTO: 469 K/UL (ref 135–420)
PLATELET COMMENTS,PCOM: ABNORMAL
PMV BLD AUTO: 10.7 FL (ref 9.2–11.8)
POTASSIUM SERPL-SCNC: 4.9 MMOL/L (ref 3.5–5.5)
PROT SERPL-MCNC: 7.7 G/DL (ref 6.4–8.2)
RBC # BLD AUTO: 3.77 M/UL (ref 4.2–5.3)
RBC MORPH BLD: ABNORMAL
RBC MORPH BLD: ABNORMAL
SODIUM SERPL-SCNC: 138 MMOL/L (ref 136–145)
TRIGL SERPL-MCNC: 101 MG/DL (ref ?–150)
VLDLC SERPL CALC-MCNC: 20.2 MG/DL
WBC # BLD AUTO: 20.9 K/UL (ref 4.6–13.2)

## 2019-06-05 PROCEDURE — 85025 COMPLETE CBC W/AUTO DIFF WBC: CPT

## 2019-06-05 PROCEDURE — 80061 LIPID PANEL: CPT

## 2019-06-05 PROCEDURE — 36415 COLL VENOUS BLD VENIPUNCTURE: CPT

## 2019-06-05 PROCEDURE — 80053 COMPREHEN METABOLIC PANEL: CPT

## 2019-06-05 RX ORDER — CHLORTHALIDONE 25 MG/1
25 TABLET ORAL DAILY
Qty: 90 TAB | Refills: 1 | Status: SHIPPED | OUTPATIENT
Start: 2019-06-05 | End: 2020-01-24

## 2019-06-05 NOTE — PATIENT INSTRUCTIONS
Bronchitis: Care Instructions  Your Care Instructions    Bronchitis is inflammation of the bronchial tubes, which carry air to the lungs. The tubes swell and produce mucus, or phlegm. The mucus and inflamed bronchial tubes make you cough. You may have trouble breathing. Most cases of bronchitis are caused by viruses like those that cause colds. Antibiotics usually do not help and they may be harmful. Bronchitis usually develops rapidly and lasts about 2 to 3 weeks in otherwise healthy people. Follow-up care is a key part of your treatment and safety. Be sure to make and go to all appointments, and call your doctor if you are having problems. It's also a good idea to know your test results and keep a list of the medicines you take. How can you care for yourself at home? · Take all medicines exactly as prescribed. Call your doctor if you think you are having a problem with your medicine. · Get some extra rest.  · Take an over-the-counter pain medicine, such as acetaminophen (Tylenol), ibuprofen (Advil, Motrin), or naproxen (Aleve) to reduce fever and relieve body aches. Read and follow all instructions on the label. · Do not take two or more pain medicines at the same time unless the doctor told you to. Many pain medicines have acetaminophen, which is Tylenol. Too much acetaminophen (Tylenol) can be harmful. · Take an over-the-counter cough medicine that contains dextromethorphan to help quiet a dry, hacking cough so that you can sleep. Avoid cough medicines that have more than one active ingredient. Read and follow all instructions on the label. · Breathe moist air from a humidifier, hot shower, or sink filled with hot water. The heat and moisture will thin mucus so you can cough it out. · Do not smoke. Smoking can make bronchitis worse. If you need help quitting, talk to your doctor about stop-smoking programs and medicines. These can increase your chances of quitting for good.   When should you call for help? Call 911 anytime you think you may need emergency care. For example, call if:    · You have severe trouble breathing.    Call your doctor now or seek immediate medical care if:    · You have new or worse trouble breathing.     · You cough up dark brown or bloody mucus (sputum).     · You have a new or higher fever.     · You have a new rash.    Watch closely for changes in your health, and be sure to contact your doctor if:    · You cough more deeply or more often, especially if you notice more mucus or a change in the color of your mucus.     · You are not getting better as expected. Where can you learn more? Go to http://kings-junito.info/. Enter H333 in the search box to learn more about \"Bronchitis: Care Instructions. \"  Current as of: September 5, 2018  Content Version: 11.9  © 6677-1469 [employer]. Care instructions adapted under license by [employer] (which disclaims liability or warranty for this information). If you have questions about a medical condition or this instruction, always ask your healthcare professional. [place] [address] any warranty or liability for your use of this information. Low Sodium Diet (2,000 Milligram): Care Instructions  Your Care Instructions    Too much sodium causes your body to hold on to extra water. This can raise your blood pressure and force your heart and kidneys to work harder. In very serious cases, this could cause you to be put in the hospital. It might even be life-threatening. By limiting sodium, you will feel better and lower your risk of serious problems. The most common source of sodium is salt. People get most of the salt in their diet from canned, prepared, and packaged foods. Fast food and restaurant meals also are very high in sodium. Your doctor will probably limit your sodium to less than 2,000 milligrams (mg) a day.  This limit counts all the sodium in prepared and packaged foods and any salt you add to your food. Follow-up care is a key part of your treatment and safety. Be sure to make and go to all appointments, and call your doctor if you are having problems. It's also a good idea to know your test results and keep a list of the medicines you take. How can you care for yourself at home? Read food labels  · Read labels on cans and food packages. The labels tell you how much sodium is in each serving. Make sure that you look at the serving size. If you eat more than the serving size, you have eaten more sodium. · Food labels also tell you the Percent Daily Value for sodium. Choose products with low Percent Daily Values for sodium. · Be aware that sodium can come in forms other than salt, including monosodium glutamate (MSG), sodium citrate, and sodium bicarbonate (baking soda). MSG is often added to Asian food. When you eat out, you can sometimes ask for food without MSG or added salt. Buy low-sodium foods  · Buy foods that are labeled \"unsalted\" (no salt added), \"sodium-free\" (less than 5 mg of sodium per serving), or \"low-sodium\" (less than 140 mg of sodium per serving). Foods labeled \"reduced-sodium\" and \"light sodium\" may still have too much sodium. Be sure to read the label to see how much sodium you are getting. · Buy fresh vegetables, or frozen vegetables without added sauces. Buy low-sodium versions of canned vegetables, soups, and other canned goods. Prepare low-sodium meals  · Cut back on the amount of salt you use in cooking. This will help you adjust to the taste. Do not add salt after cooking. One teaspoon of salt has about 2,300 mg of sodium. · Take the salt shaker off the table. · Flavor your food with garlic, lemon juice, onion, vinegar, herbs, and spices. Do not use soy sauce, lite soy sauce, steak sauce, onion salt, garlic salt, celery salt, mustard, or ketchup on your food. · Use low-sodium salad dressings, sauces, and ketchup.  Or make your own salad dressings and sauces without adding salt. · Use less salt (or none) when recipes call for it. You can often use half the salt a recipe calls for without losing flavor. Other foods such as rice, pasta, and grains do not need added salt. · Rinse canned vegetables, and cook them in fresh water. This removes somebut not allof the salt. · Avoid water that is naturally high in sodium or that has been treated with water softeners, which add sodium. Call your local water company to find out the sodium content of your water supply. If you buy bottled water, read the label and choose a sodium-free brand. Avoid high-sodium foods  · Avoid eating:  ? Smoked, cured, salted, and canned meat, fish, and poultry. ? Ham, ravi, hot dogs, and luncheon meats. ? Regular, hard, and processed cheese and regular peanut butter. ? Crackers with salted tops, and other salted snack foods such as pretzels, chips, and salted popcorn. ? Frozen prepared meals, unless labeled low-sodium. ? Canned and dried soups, broths, and bouillon, unless labeled sodium-free or low-sodium. ? Canned vegetables, unless labeled sodium-free or low-sodium. ? [place] [employer] fries, pizza, tacos, and other fast foods. ? Pickles, olives, ketchup, and other condiments, especially soy sauce, unless labeled sodium-free or low-sodium. Where can you learn more? Go to http://kings-junito.info/. Enter K160 in the search box to learn more about \"Low Sodium Diet (2,000 Milligram): Care Instructions. \"  Current as of: March 28, 2018  Content Version: 11.9  © 1981-0872 [employer]. Care instructions adapted under license by [employer] (which disclaims liability or warranty for this information). If you have questions about a medical condition or this instruction, always ask your healthcare professional. [address] any warranty or liability for your use of this information.

## 2019-06-05 NOTE — PROGRESS NOTES
Chief Complaint   Patient presents with    Hypertension    Coronary Artery Disease    Cholesterol Problem    Sleep Problem     1. Have you been to the ER, urgent care clinic since your last visit? Hospitalized since your last visit? Yes When: 6/2/19 Reason for visit: Bodyaches    2. Have you seen or consulted any other health care providers outside of the [address] [place] [address] since your last visit? Include any pap smears or colon screening.  Yes Where: Cardiology for 2nd opinion Reason for visit: 2nd opinion

## 2019-06-05 NOTE — PROGRESS NOTES
HISTORY OF PRESENT ILLNESS  Margareth Prasad is a 46 y.o. female. HPI: Here for follow up. Recent visit to urgent care and then Er. Has h/o asthma. Had sob and cough. Was diagnosed with bronchitis and given prednisone and azithromycin. Chest x-ray result as below. ? Atelectasis vs infiltrate. No fever at this time. No appetite changes. Tolerated antibiotic well. No abdominal pain. No nausea or vomiting. No urinary or bowel complains. No unusual fatigue. She is feeling better with sob. Cough is still present but better than before. No sputum. No wheezing or chest tightness. No chest pain. No palpitation or diaphoresis. Visit Vitals  /80 (BP 1 Location: Left arm, BP Patient Position: Sitting)   Pulse 65   Temp 97.9 °F (36.6 °C) (Oral)   Resp 16   Ht 5' 1\" (1.549 m)   Wt 160 lb (72.6 kg)   SpO2 96%   BMI 30.23 kg/m²     Review medication list, vitals, problem list,allergies. Also h/o CAD. NSEMI. Following cardiology. On risk factor management. discussed high BMI. Discussed diet modification, calorie count and exercise. Discussed importance of weight loss. agree to do exercise and life style modification. Diet and exercise hand out given in AVS.   She has been walking and doing routine exercise as tolerated. Working on diet modification as well and working on weight loss. review labs.    Lab Results   Component Value Date/Time    WBC 13.1 06/02/2019 12:15 PM    HGB 10.8 (L) 06/02/2019 12:15 PM    HCT 32.0 (L) 06/02/2019 12:15 PM    PLATELET 021 28/62/8311 12:15 PM    MCV 87.9 06/02/2019 12:15 PM     Lab Results   Component Value Date/Time    Sodium 139 06/02/2019 12:15 PM    Potassium 3.6 06/02/2019 12:15 PM    Chloride 102 06/02/2019 12:15 PM    CO2 29 06/02/2019 12:15 PM    Anion gap 8 06/02/2019 12:15 PM    Glucose 101 (H) 06/02/2019 12:15 PM    BUN 14 06/02/2019 12:15 PM    Creatinine 0.98 06/02/2019 12:15 PM    BUN/Creatinine ratio 14 06/02/2019 12:15 PM    GFR est AA >60 06/02/2019 12:15 PM    GFR est non-AA 59 (L) 06/02/2019 12:15 PM    Calcium 9.0 06/02/2019 12:15 PM    Bilirubin, total 0.2 12/12/2018 12:07 AM    AST (SGOT) 53 (H) 12/12/2018 12:07 AM    Alk. phosphatase 103 12/12/2018 12:07 AM    Protein, total 7.1 12/12/2018 12:07 AM    Albumin 3.8 12/12/2018 12:07 AM    Globulin 3.3 12/12/2018 12:07 AM    A-G Ratio 1.2 12/12/2018 12:07 AM    ALT (SGPT) 40 12/12/2018 12:07 AM     Lab Results   Component Value Date/Time    Cholesterol, total 155 12/08/2018 03:22 AM    HDL Cholesterol 42 12/08/2018 03:22 AM    LDL, calculated 98.6 12/08/2018 03:22 AM    VLDL, calculated 14.4 12/08/2018 03:22 AM    Triglyceride 72 12/08/2018 03:22 AM    CHOL/HDL Ratio 3.7 12/08/2018 03:22 AM     Lab Results   Component Value Date/Time    TSH 1.040 06/09/2014 12:00 AM     Lab Results   Component Value Date/Time    Hemoglobin A1c 5.7 (H) 05/24/2017 09:25 AM         ROS: see HPI     Physical Exam   Constitutional: She is oriented to person, place, and time. No distress. Neck: No thyromegaly present. Cardiovascular: Normal rate, regular rhythm and normal heart sounds. Pulmonary/Chest:   CTA   Abdominal: Soft. Bowel sounds are normal. There is no tenderness. Musculoskeletal: She exhibits no edema. Lymphadenopathy:     She has no cervical adenopathy. Neurological: She is oriented to person, place, and time. Psychiatric: Her behavior is normal.       ASSESSMENT and PLAN    ICD-10-CM ICD-9-CM    1. Bronchitis: improving gradually along with prednisone. Done with azithromycin. For now albuterol as needed and repeat chest x-ray in 6 wks as result showed ? Atelectasis vs infiltrate. No fever. J40 490 XR CHEST PA LAT   2. Laryngitis: for now zyrtec at bedtime as needed, salt water gargle. Observe. J04.0 464.00    3. Essential hypertension: well controlled. Continue current dose of medication and low salt diet. Exercise as tolerated. I10 401.9    4.  Coronary artery disease involving native coronary artery of native heart without angina pectoris: following cardiology. Risk factor management. I25.10 414.01    5. BMI 30.0-30.9,adult: see HPI  Z68.30 V85.30    6. Screening for colon cancer: last done in 2016 and due in 3 years. Per patient request done a referral.  Z12.11 V76.51 REFERRAL TO GASTROENTEROLOGY   Pt understood and agree with the plan   Noted had pending labs. Had anemia on labs recently ordered by Dr. Joe Mallory.   Will f/u after results. Also prediabetes. She is working on diet modification and exercise. Will observe. Follow-up and Dispositions    · Return in about 2 months (around 8/5/2019).

## 2019-06-11 DIAGNOSIS — R79.89 ABNORMAL CBC: Primary | ICD-10-CM

## 2019-06-11 NOTE — PROGRESS NOTES
Noted elevated WBC. Also elevated platelets. Recently diagnosed with bronchitis and was treated with antibiotic and prednisone. This lab abnormalities Could be secondary to prednisone. For now will repeat lab in couple of weeks and see how she is going? ? If still elevated will do further work up.  Thanks

## 2019-06-12 NOTE — PROGRESS NOTES
Contacted patient and verified identity using name and date of birth (2- identifiers)  Spoke with patient and she verbalized understanding of elevated WBC and platelets possibly 2' to prednisone. Will recheck in 1 week. Patient aware lab order at front for .

## 2019-06-19 RX ORDER — CLOPIDOGREL BISULFATE 75 MG/1
TABLET ORAL
Qty: 30 TAB | Refills: 0 | Status: SHIPPED | OUTPATIENT
Start: 2019-06-19 | End: 2019-07-17 | Stop reason: SDUPTHER

## 2019-07-17 RX ORDER — CLOPIDOGREL BISULFATE 75 MG/1
TABLET ORAL
Qty: 30 TAB | Refills: 11 | Status: SHIPPED | OUTPATIENT
Start: 2019-07-17 | End: 2020-07-13

## 2019-07-24 ENCOUNTER — HOSPITAL ENCOUNTER (OUTPATIENT)
Dept: LAB | Age: 54
Discharge: HOME | End: 2019-07-24
Payer: OTHER GOVERNMENT

## 2019-07-24 ENCOUNTER — HOSPITAL ENCOUNTER (OUTPATIENT)
Dept: GENERAL RADIOLOGY | Age: 54
Discharge: HOME | End: 2019-07-24
Payer: OTHER GOVERNMENT

## 2019-07-24 DIAGNOSIS — J40 BRONCHITIS: ICD-10-CM

## 2019-07-24 DIAGNOSIS — R79.89 ABNORMAL CBC: ICD-10-CM

## 2019-07-24 DIAGNOSIS — D50.8 OTHER IRON DEFICIENCY ANEMIA: Primary | ICD-10-CM

## 2019-07-24 LAB
BASOPHILS # BLD: 0 K/UL (ref 0–0.1)
BASOPHILS NFR BLD: 0 % (ref 0–2)
DIFFERENTIAL METHOD BLD: ABNORMAL
EOSINOPHIL # BLD: 0.4 K/UL (ref 0–0.4)
EOSINOPHIL NFR BLD: 6 % (ref 0–5)
ERYTHROCYTE [DISTWIDTH] IN BLOOD BY AUTOMATED COUNT: 13 % (ref 11.6–14.5)
HCT VFR BLD AUTO: 32.1 % (ref 35–45)
HGB BLD-MCNC: 10.6 G/DL (ref 12–16)
LYMPHOCYTES # BLD: 2.8 K/UL (ref 0.9–3.6)
LYMPHOCYTES NFR BLD: 41 % (ref 21–52)
MCH RBC QN AUTO: 29.5 PG (ref 24–34)
MCHC RBC AUTO-ENTMCNC: 33 G/DL (ref 31–37)
MCV RBC AUTO: 89.4 FL (ref 74–97)
MONOCYTES # BLD: 0.5 K/UL (ref 0.05–1.2)
MONOCYTES NFR BLD: 7 % (ref 3–10)
NEUTS SEG # BLD: 3.1 K/UL (ref 1.8–8)
NEUTS SEG NFR BLD: 46 % (ref 40–73)
PLATELET # BLD AUTO: 268 K/UL (ref 135–420)
PMV BLD AUTO: 11.3 FL (ref 9.2–11.8)
RBC # BLD AUTO: 3.59 M/UL (ref 4.2–5.3)
WBC # BLD AUTO: 6.9 K/UL (ref 4.6–13.2)

## 2019-07-24 PROCEDURE — 85025 COMPLETE CBC W/AUTO DIFF WBC: CPT

## 2019-07-24 PROCEDURE — 71046 X-RAY EXAM CHEST 2 VIEWS: CPT

## 2019-07-24 PROCEDURE — 36415 COLL VENOUS BLD VENIPUNCTURE: CPT

## 2019-07-26 NOTE — PROGRESS NOTES
Called and spoke with Migue De La Rosa at [facility] lab and unable to add on iron profile as only CBC drawn.  Will have patient get done

## 2019-07-29 ENCOUNTER — TELEPHONE (OUTPATIENT)
Dept: FAMILY MEDICINE CLINIC | Age: 54
End: 2019-07-29

## 2019-07-29 NOTE — TELEPHONE ENCOUNTER
Contacted patient and verified identity using name and date of birth (2- identifiers)  Please see results note for call completion.

## 2019-08-01 NOTE — PROGRESS NOTES
Contacted patient and verified identity using name and date of birth (2- identifiers)  Spoke with patient and she verbalized understanding of anemia and need for additional labs. Order mailed to patient.  And Hematology referral

## 2019-08-09 RX ORDER — ATORVASTATIN CALCIUM 40 MG/1
TABLET, FILM COATED ORAL
Qty: 90 TAB | Refills: 0 | Status: SHIPPED | OUTPATIENT
Start: 2019-08-09 | End: 2019-11-06 | Stop reason: SDUPTHER

## 2019-08-12 ENCOUNTER — HOSPITAL ENCOUNTER (OUTPATIENT)
Dept: LAB | Age: 54
Discharge: HOME | End: 2019-08-12
Payer: OTHER GOVERNMENT

## 2019-08-12 DIAGNOSIS — D50.8 OTHER IRON DEFICIENCY ANEMIA: ICD-10-CM

## 2019-08-12 LAB
IRON SATN MFR SERPL: 31 %
IRON SERPL-MCNC: 74 UG/DL (ref 50–175)
TIBC SERPL-MCNC: 239 UG/DL (ref 250–450)

## 2019-08-12 PROCEDURE — 36415 COLL VENOUS BLD VENIPUNCTURE: CPT

## 2019-08-12 PROCEDURE — 83540 ASSAY OF IRON: CPT

## 2019-09-09 ENCOUNTER — OFFICE VISIT (OUTPATIENT)
Dept: FAMILY MEDICINE CLINIC | Age: 54
End: 2019-09-09

## 2019-09-09 VITALS
BODY MASS INDEX: 30.13 KG/M2 | WEIGHT: 159.6 LBS | RESPIRATION RATE: 16 BRPM | DIASTOLIC BLOOD PRESSURE: 84 MMHG | SYSTOLIC BLOOD PRESSURE: 116 MMHG | OXYGEN SATURATION: 98 % | HEIGHT: 61 IN | HEART RATE: 66 BPM | TEMPERATURE: 97.7 F

## 2019-09-09 DIAGNOSIS — R73.01 IMPAIRED FASTING BLOOD SUGAR: ICD-10-CM

## 2019-09-09 DIAGNOSIS — M25.511 RIGHT SHOULDER PAIN, UNSPECIFIED CHRONICITY: ICD-10-CM

## 2019-09-09 DIAGNOSIS — E55.9 VITAMIN D DEFICIENCY: ICD-10-CM

## 2019-09-09 DIAGNOSIS — I10 ESSENTIAL HYPERTENSION: ICD-10-CM

## 2019-09-09 DIAGNOSIS — I25.10 CORONARY ARTERY DISEASE INVOLVING NATIVE CORONARY ARTERY OF NATIVE HEART WITHOUT ANGINA PECTORIS: Primary | ICD-10-CM

## 2019-09-09 NOTE — PATIENT INSTRUCTIONS
Low Sodium Diet (2,000 Milligram): Care Instructions  Your Care Instructions    Too much sodium causes your body to hold on to extra water. This can raise your blood pressure and force your heart and kidneys to work harder. In very serious cases, this could cause you to be put in the hospital. It might even be life-threatening. By limiting sodium, you will feel better and lower your risk of serious problems. The most common source of sodium is salt. People get most of the salt in their diet from canned, prepared, and packaged foods. Fast food and restaurant meals also are very high in sodium. Your doctor will probably limit your sodium to less than 2,000 milligrams (mg) a day. This limit counts all the sodium in prepared and packaged foods and any salt you add to your food. Follow-up care is a key part of your treatment and safety. Be sure to make and go to all appointments, and call your doctor if you are having problems. It's also a good idea to know your test results and keep a list of the medicines you take. How can you care for yourself at home? Read food labels  · Read labels on cans and food packages. The labels tell you how much sodium is in each serving. Make sure that you look at the serving size. If you eat more than the serving size, you have eaten more sodium. · Food labels also tell you the Percent Daily Value for sodium. Choose products with low Percent Daily Values for sodium. · Be aware that sodium can come in forms other than salt, including monosodium glutamate (MSG), sodium citrate, and sodium bicarbonate (baking soda). MSG is often added to Asian food. When you eat out, you can sometimes ask for food without MSG or added salt. Buy low-sodium foods  · Buy foods that are labeled \"unsalted\" (no salt added), \"sodium-free\" (less than 5 mg of sodium per serving), or \"low-sodium\" (less than 140 mg of sodium per serving).  Foods labeled \"reduced-sodium\" and \"light sodium\" may still have too much sodium. Be sure to read the label to see how much sodium you are getting. · Buy fresh vegetables, or frozen vegetables without added sauces. Buy low-sodium versions of canned vegetables, soups, and other canned goods. Prepare low-sodium meals  · Cut back on the amount of salt you use in cooking. This will help you adjust to the taste. Do not add salt after cooking. One teaspoon of salt has about 2,300 mg of sodium. · Take the salt shaker off the table. · Flavor your food with garlic, lemon juice, onion, vinegar, herbs, and spices. Do not use soy sauce, lite soy sauce, steak sauce, onion salt, garlic salt, celery salt, mustard, or ketchup on your food. · Use low-sodium salad dressings, sauces, and ketchup. Or make your own salad dressings and sauces without adding salt. · Use less salt (or none) when recipes call for it. You can often use half the salt a recipe calls for without losing flavor. Other foods such as rice, pasta, and grains do not need added salt. · Rinse canned vegetables, and cook them in fresh water. This removes some--but not all--of the salt. · Avoid water that is naturally high in sodium or that has been treated with water softeners, which add sodium. Call your local water company to find out the sodium content of your water supply. If you buy bottled water, read the label and choose a sodium-free brand. Avoid high-sodium foods  · Avoid eating:  ? Smoked, cured, salted, and canned meat, fish, and poultry. ? Ham, ravi, hot dogs, and luncheon meats. ? Regular, hard, and processed cheese and regular peanut butter. ? Crackers with salted tops, and other salted snack foods such as pretzels, chips, and salted popcorn. ? Frozen prepared meals, unless labeled low-sodium. ? Canned and dried soups, broths, and bouillon, unless labeled sodium-free or low-sodium. ? Canned vegetables, unless labeled sodium-free or low-sodium. ? [place] [employer] fries, pizza, tacos, and other fast foods.   ? David Diana olives, ketchup, and other condiments, especially soy sauce, unless labeled sodium-free or low-sodium. Where can you learn more? Go to http://kings-junito.info/. Enter V070 in the search box to learn more about \"Low Sodium Diet (2,000 Milligram): Care Instructions. \"  Current as of: November 7, 2018  Content Version: 12.1  © 6437-8770 [employer]. Care instructions adapted under license by [employer] (which disclaims liability or warranty for this information). If you have questions about a medical condition or this instruction, always ask your healthcare professional. [address] any warranty or liability for your use of this information. Learning About Coronary Artery Disease (CAD)  What is coronary artery disease? Coronary artery disease (CAD) occurs when plaque builds up in the arteries that bring oxygen-rich blood to your heart. Plaque is a fatty substance made of cholesterol, calcium, and other substances in the blood. This process is called hardening of the arteries, or atherosclerosis. What happens when you have coronary artery disease? · Plaque may narrow the coronary arteries. Narrowed arteries cause poor blood flow. This can lead to angina symptoms such as chest pain or discomfort. If blood flow is completely blocked, you could have a heart attack. · You can slow CAD and reduce the risk of future problems by making changes in your lifestyle. These include quitting smoking and eating heart-healthy foods. · Treatments for CAD, along with changes in your lifestyle, can help you live a longer and healthier life. How can you prevent coronary artery disease? · Do not smoke. It may be the best thing you can do to prevent heart disease. If you need help quitting, talk to your doctor about stop-smoking programs and medicines. These can increase your chances of quitting for good. · Be active.  Get at least 30 minutes of exercise on most days of the week. Walking is a good choice. You also may want to do other activities, such as running, swimming, cycling, or playing tennis or team sports. · Eat heart-healthy foods. Eat more fruits and vegetables and less foods that contain saturated and trans fats. Limit alcohol, sodium, and sweets. · Stay at a healthy weight. Lose weight if you need to. · Manage other health problems such as diabetes, high blood pressure, and high cholesterol. · If you have talked about it with your doctor, take a low-dose aspirin every day. Aspirin can help certain people lower their risk of a heart attack or stroke. But taking aspirin isn't right for everyone, because it can cause serious bleeding. Do not start taking daily aspirin unless your doctor knows about it. How is coronary artery disease treated? · Your doctor will suggest that you make lifestyle changes. For example, your doctor may ask you to eat healthy foods, quit smoking, lose extra weight, and be more active. · You will have to take medicines. · Your doctor may suggest a procedure to open narrowed or blocked arteries. This is called angioplasty. Or your doctor may suggest using healthy blood vessels to create detours around narrowed or blocked arteries. This is called bypass surgery. Follow-up care is a key part of your treatment and safety. Be sure to make and go to all appointments, and call your doctor if you are having problems. It's also a good idea to know your test results and keep a list of the medicines you take. Where can you learn more? Go to http://kings-junito.info/. Enter [phone] 6212 [id number] in the search box to learn more about \"Learning About Coronary Artery Disease (CAD). \"  Current as of: July 22, 2018  Content Version: 12.1  © 8406-5302 [employer]. Care instructions adapted under license by [employer] (which disclaims liability or warranty for this information).  If you have questions about a medical condition or this instruction, always ask your healthcare professional. [place] [address] any warranty or liability for your use of this information.

## 2019-09-09 NOTE — PROGRESS NOTES
1. Have you been to the ER, urgent care clinic since your last visit? Hospitalized since your last visit? No    2. Have you seen or consulted any other health care providers outside of the [address] [place] [address] since your last visit? Include any pap smears or colon screening.  Dr. Rosalia Hyde: 9/05/19    Chief Complaint   Patient presents with    Shoulder Pain     right shoulder pain ongoing    Hypertension    Coronary Artery Disease

## 2019-09-09 NOTE — PROGRESS NOTES
HISTORY OF PRESENT ILLNESS  Allen Wayne is a 53 y.o. female. HPI: here for follow up. H/o CAD. No intervention needed. On medical management. Amy Pacheco now she has an appt with [place] to have second opinion. No symptoms of angina. No sob. No chest pain. No nausea or vomiting. No palpitation or diaphoresis. No unusal fatigue. H/o anemia. ? Chronic disease. Seen hematology last week. Will obtain records. Denies any cough or cold. No unusual fatigue. No fever. Taking iron supplement. Recent lab showed low TIBC and total iron wnl. Also taking vitamin D supplement as it was low at one time. Denies any unuusal fatigue. H/o hypertension. Stable vitals. Asymptomatic. Prediabetes. For now working on diet modification. H/o asthma. Stable. No increase use of albuterol. No wheezing, cough or chest congestion   Said she had rt totator cuff problem and last week she had direct blunt injury over rt shoulder and had small bruise. No limitation in ROM or any swelling over joint. Mild tenderness and localized swelling over bruise site. Mild pain. No radiation of pain. For now advised to take ice application. Visit Vitals  /84 (BP 1 Location: Left arm, BP Patient Position: Sitting)   Pulse 66   Temp 97.7 °F (36.5 °C) (Oral)   Resp 16   Ht 5' 1\" (1.549 m)   Wt 159 lb 9.6 oz (72.4 kg)   SpO2 98%   BMI 30.16 kg/m²     Review medication list, vitals, problem list,allergies. Review labs.    Lab Results   Component Value Date/Time    WBC 6.9 07/24/2019 07:41 AM    HGB 10.6 (L) 07/24/2019 07:41 AM    HCT 32.1 (L) 07/24/2019 07:41 AM    PLATELET 571 75/97/9114 07:41 AM    MCV 89.4 07/24/2019 07:41 AM     Lab Results   Component Value Date/Time    Sodium 138 06/05/2019 08:26 AM    Potassium 4.9 06/05/2019 08:26 AM    Chloride 102 06/05/2019 08:26 AM    CO2 28 06/05/2019 08:26 AM    Anion gap 8 06/05/2019 08:26 AM    Glucose 106 (H) 06/05/2019 08:26 AM    BUN 26 (H) 06/05/2019 08:26 AM    Creatinine 1.06 06/05/2019 08:26 AM BUN/Creatinine ratio 25 (H) 06/05/2019 08:26 AM    GFR est AA >60 06/05/2019 08:26 AM    GFR est non-AA 54 (L) 06/05/2019 08:26 AM    Calcium 9.7 06/05/2019 08:26 AM    Bilirubin, total 0.4 06/05/2019 08:26 AM    AST (SGOT) 16 06/05/2019 08:26 AM    Alk. phosphatase 135 (H) 06/05/2019 08:26 AM    Protein, total 7.7 06/05/2019 08:26 AM    Albumin 4.0 06/05/2019 08:26 AM    Globulin 3.7 06/05/2019 08:26 AM    A-G Ratio 1.1 06/05/2019 08:26 AM    ALT (SGPT) 40 06/05/2019 08:26 AM     Lab Results   Component Value Date/Time    Cholesterol, total 131 06/05/2019 08:26 AM    HDL Cholesterol 42 06/05/2019 08:26 AM    LDL, calculated 68.8 06/05/2019 08:26 AM    VLDL, calculated 20.2 06/05/2019 08:26 AM    Triglyceride 101 06/05/2019 08:26 AM    CHOL/HDL Ratio 3.1 06/05/2019 08:26 AM     Lab Results   Component Value Date/Time    TSH 1.040 06/09/2014 12:00 AM     Lab Results   Component Value Date/Time    Hemoglobin A1c 5.7 (H) 05/24/2017 09:25 AM     Lab Results   Component Value Date/Time    Vitamin D 25-Hydroxy 40.6 05/24/2017 09:25 AM       ROS: Denies any headache, dizziness, no chest pain or trouble breathing, no arm or leg weakness. No nausea or vomiting, no weight or appetite changes, no mood changes . No urine or bowel complains, no palpitation, no diaphoresis. No abdominal pain. No cold or cough. No leg swelling. No fever. No sleep trouble. Physical Exam   Constitutional: She is oriented to person, place, and time. No distress. Neck: No thyromegaly present. Cardiovascular: Normal rate, regular rhythm and normal heart sounds. Pulmonary/Chest:   CTA   Abdominal: Soft. Bowel sounds are normal. There is no tenderness. Musculoskeletal: She exhibits no edema. Rt shoulder: bruise and mild tenderness over joint. ROM wnl. Lymphadenopathy:     She has no cervical adenopathy. Neurological: She is oriented to person, place, and time.    Psychiatric: Her behavior is normal.       ASSESSMENT and PLAN ICD-10-CM ICD-9-CM    1. Coronary artery disease involving native coronary artery of native heart without angina pectoris: on medical management. Following cardiology and also appt with [place] for second opinion. See HPI  I25.10 414.01    2. Essential hypertension: well controlled. Continue current dose of medication and low salt diet. Exercise as tolerated. I10 401.9    3. Vitamin D deficiency: on otc supplement. E55.9 268.9    4. Impaired fasting blood sugar: diet modification. She has been walking. Will observe.  R73.01 790.21    5. Right shoulder pain, unspecified chronicity: for now localized bruise and swelling. Advised to observe and ice application. If problem persist she will come back sooner. M25.511 719.41    Pt understood and agree with the plan   Colonoscopy scheduled for oct. This year. Discussed about shingrix and she will speak with the pharmacist.   Follow-up and Dispositions    · Return in about 3 months (around 12/9/2019).

## 2019-10-02 ENCOUNTER — TELEPHONE (OUTPATIENT)
Dept: CARDIOLOGY CLINIC | Age: 54
End: 2019-10-02

## 2019-10-02 NOTE — LETTER
10/2/2019 9:51 AM 
 
Ms. Obi Root [address] Obi Root was seen in our office on 3/7/2019 for cardiac evaluation. From a cardiac standpoint she should wait until December 2018 until having any interruptions in her Plavix. If it is an emergent situation or she is having symptoms we can re-evaluate the request to hold Plavix. Please feel free to contact our office if you have any questions regarding this patient. Sincerely, Mariel Domingo, DO

## 2019-10-02 NOTE — TELEPHONE ENCOUNTER
Request to hold Plavix prior to colonoscopy received from [facility] and reviewed with Dr. Cosme Spann. Verbal order and read back per Lynnie Lanes, DO  Unless it is emergent or she is having symptoms she should wait until December 2019 to have any interruption of her Plavix. Mindy from Dr. Steve Valencia office was made aware and since colonoscopy is for screening they will reschedule it. Letter faxed to them as well.

## 2019-11-06 RX ORDER — ATORVASTATIN CALCIUM 40 MG/1
TABLET, FILM COATED ORAL
Qty: 90 TAB | Refills: 0 | Status: SHIPPED | OUTPATIENT
Start: 2019-11-06 | End: 2020-02-06

## 2020-01-24 ENCOUNTER — HOSPITAL ENCOUNTER (OUTPATIENT)
Dept: MAMMOGRAPHY | Age: 55
Discharge: HOME | End: 2020-01-24
Attending: FAMILY MEDICINE
Payer: OTHER GOVERNMENT

## 2020-01-24 ENCOUNTER — OFFICE VISIT (OUTPATIENT)
Dept: FAMILY MEDICINE CLINIC | Age: 55
End: 2020-01-24

## 2020-01-24 VITALS
BODY MASS INDEX: 30.96 KG/M2 | DIASTOLIC BLOOD PRESSURE: 82 MMHG | OXYGEN SATURATION: 96 % | WEIGHT: 164 LBS | HEIGHT: 61 IN | SYSTOLIC BLOOD PRESSURE: 124 MMHG | HEART RATE: 71 BPM | RESPIRATION RATE: 16 BRPM | TEMPERATURE: 98.6 F

## 2020-01-24 DIAGNOSIS — R73.03 PREDIABETES: ICD-10-CM

## 2020-01-24 DIAGNOSIS — M25.511 CHRONIC RIGHT SHOULDER PAIN: ICD-10-CM

## 2020-01-24 DIAGNOSIS — G89.29 CHRONIC RIGHT SHOULDER PAIN: ICD-10-CM

## 2020-01-24 DIAGNOSIS — E61.1 IRON DEFICIENCY: ICD-10-CM

## 2020-01-24 DIAGNOSIS — E55.9 VITAMIN D DEFICIENCY: ICD-10-CM

## 2020-01-24 DIAGNOSIS — Z12.31 VISIT FOR SCREENING MAMMOGRAM: ICD-10-CM

## 2020-01-24 DIAGNOSIS — I21.4 NSTEMI (NON-ST ELEVATED MYOCARDIAL INFARCTION) (HCC): Primary | ICD-10-CM

## 2020-01-24 DIAGNOSIS — I10 ESSENTIAL HYPERTENSION: ICD-10-CM

## 2020-01-24 PROCEDURE — 77063 BREAST TOMOSYNTHESIS BI: CPT

## 2020-01-24 NOTE — PROGRESS NOTES
HISTORY OF PRESENT ILLNESS  Joce Abarca is a 53 y.o. female. HPI: Here for follow up . H/o CAD . NSTEMI . On medical management. See specialist at [facility]. No new recommendations. On aspirin and plavix. She has been working on healthy life style. Working on low salt diet and does routine exercise. H/o prediabetes. Again working on diet modification and life style modification. On vitamin D supplement. No unusual fatigue. H/o hypertension. Asymptomatic. On medication and no side effects. H/o anemia. Seen hematology. Not on any supplement. No unusual fatigue. Pending colonoscopy this week. Last visit c/o rt shoulder pain with direct injury. For now asymptomatic. Fairly stable. No limitation in ROM. Visit Vitals  /82 (BP 1 Location: Right arm, BP Patient Position: Sitting)   Pulse 71   Temp 98.6 °F (37 °C) (Oral)   Resp 16   Ht 5' 1\" (1.549 m)   Wt 164 lb (74.4 kg)   SpO2 96%   BMI 30.99 kg/m²     Review medication list, vitals, problem list,allergies. Lab Results   Component Value Date/Time    WBC 6.9 07/24/2019 07:41 AM    HGB 10.6 (L) 07/24/2019 07:41 AM    HCT 32.1 (L) 07/24/2019 07:41 AM    PLATELET 600 71/04/6531 07:41 AM    MCV 89.4 07/24/2019 07:41 AM     Lab Results   Component Value Date/Time    Sodium 138 06/05/2019 08:26 AM    Potassium 4.9 06/05/2019 08:26 AM    Chloride 102 06/05/2019 08:26 AM    CO2 28 06/05/2019 08:26 AM    Anion gap 8 06/05/2019 08:26 AM    Glucose 106 (H) 06/05/2019 08:26 AM    BUN 26 (H) 06/05/2019 08:26 AM    Creatinine 1.06 06/05/2019 08:26 AM    BUN/Creatinine ratio 25 (H) 06/05/2019 08:26 AM    GFR est AA >60 06/05/2019 08:26 AM    GFR est non-AA 54 (L) 06/05/2019 08:26 AM    Calcium 9.7 06/05/2019 08:26 AM    Bilirubin, total 0.4 06/05/2019 08:26 AM    AST (SGOT) 16 06/05/2019 08:26 AM    Alk.  phosphatase 135 (H) 06/05/2019 08:26 AM    Protein, total 7.7 06/05/2019 08:26 AM    Albumin 4.0 06/05/2019 08:26 AM    Globulin 3.7 06/05/2019 08:26 AM A-G Ratio 1.1 06/05/2019 08:26 AM    ALT (SGPT) 40 06/05/2019 08:26 AM     Lab Results   Component Value Date/Time    Cholesterol, total 131 06/05/2019 08:26 AM    HDL Cholesterol 42 06/05/2019 08:26 AM    LDL, calculated 68.8 06/05/2019 08:26 AM    VLDL, calculated 20.2 06/05/2019 08:26 AM    Triglyceride 101 06/05/2019 08:26 AM    CHOL/HDL Ratio 3.1 06/05/2019 08:26 AM     Lab Results   Component Value Date/Time    TSH 1.040 06/09/2014 12:00 AM     Lab Results   Component Value Date/Time    Hemoglobin A1c 5.7 (H) 05/24/2017 09:25 AM     Lab Results   Component Value Date/Time    Vitamin D 25-Hydroxy 40.6 05/24/2017 09:25 AM         ROS: see HPI     Physical Exam  Constitutional:       General: She is not in acute distress. Neck:      Comments: No palpable enlarge thyroid gland. Cardiovascular:      Rate and Rhythm: Normal rate and regular rhythm. Heart sounds: Normal heart sounds. Abdominal:      General: Bowel sounds are normal.      Palpations: Abdomen is soft. Tenderness: There is no tenderness. Lymphadenopathy:      Cervical: No cervical adenopathy. Neurological:      Mental Status: She is oriented to person, place, and time. Psychiatric:         Behavior: Behavior normal.         ASSESSMENT and PLAN    ICD-10-CM ICD-9-CM    1. NSTEMI (non-ST elevated myocardial infarction) [facility]): on medical management. On aspirin and plavix. Repeat labs. I21.4 410.70 LIPID PANEL      METABOLIC PANEL, COMPREHENSIVE   2. Prediabetes: diet modification and exercise. Repeat labs. R73.03 790.29    3. Vitamin D deficiency: on supplement. E55.9 268.9    4. : well controlled. Continue current dose of medication and low salt diet. Exercise as tolerated. Q31 154.2 METABOLIC PANEL, COMPREHENSIVE   5. Screening for diabetes mellitus Z13.1 V77.1 HEMOGLOBIN A1C WITH EAG   6. Iron deficiency: repeat labs. Seen hematology. Also scheduled for colonoscopy. Now recommended to come off of medication.   E61.1 280.9 CBC W/O DIFF      IRON PROFILE   7. Chronic right shoulder pain: stable. Will observe with symptomatic treatment. M25.511 719.41     G89.29 338.29     on and off. rotator cuff injury. for now stable. will observe with symptomatic treatment. Pt understood and agree with the plan   Review HM   Has mammogram appt today   Discussed to speak with the pharmacist regarding shingrix. Follow-up and Dispositions    · Return in about 3 months (around 4/24/2020).

## 2020-01-24 NOTE — PROGRESS NOTES
Chief Complaint   Patient presents with    Coronary Artery Disease    Hypertension    Other     Pre-DM     1. Have you been to the ER, urgent care clinic since your last visit? Hospitalized since your last visit? No    2. Have you seen or consulted any other health care providers outside of the [address] [place] [address] since your last visit? Include any pap smears or colon screening.  No

## 2020-01-24 NOTE — PATIENT INSTRUCTIONS
Low Sodium Diet (2,000 Milligram): Care Instructions Your Care Instructions Too much sodium causes your body to hold on to extra water. This can raise your blood pressure and force your heart and kidneys to work harder. In very serious cases, this could cause you to be put in the hospital. It might even be life-threatening. By limiting sodium, you will feel better and lower your risk of serious problems. The most common source of sodium is salt. People get most of the salt in their diet from canned, prepared, and packaged foods. Fast food and restaurant meals also are very high in sodium. Your doctor will probably limit your sodium to less than 2,000 milligrams (mg) a day. This limit counts all the sodium in prepared and packaged foods and any salt you add to your food. Follow-up care is a key part of your treatment and safety. Be sure to make and go to all appointments, and call your doctor if you are having problems. It's also a good idea to know your test results and keep a list of the medicines you take. How can you care for yourself at home? Read food labels · Read labels on cans and food packages. The labels tell you how much sodium is in each serving. Make sure that you look at the serving size. If you eat more than the serving size, you have eaten more sodium. · Food labels also tell you the Percent Daily Value for sodium. Choose products with low Percent Daily Values for sodium. · Be aware that sodium can come in forms other than salt, including monosodium glutamate (MSG), sodium citrate, and sodium bicarbonate (baking soda). MSG is often added to Asian food. When you eat out, you can sometimes ask for food without MSG or added salt. Buy low-sodium foods · Buy foods that are labeled \"unsalted\" (no salt added), \"sodium-free\" (less than 5 mg of sodium per serving), or \"low-sodium\" (less than 140 mg of sodium per serving).  Foods labeled \"reduced-sodium\" and \"light sodium\" may still have too much sodium. Be sure to read the label to see how much sodium you are getting. · Buy fresh vegetables, or frozen vegetables without added sauces. Buy low-sodium versions of canned vegetables, soups, and other canned goods. Prepare low-sodium meals · Cut back on the amount of salt you use in cooking. This will help you adjust to the taste. Do not add salt after cooking. One teaspoon of salt has about 2,300 mg of sodium. · Take the salt shaker off the table. · Flavor your food with garlic, lemon juice, onion, vinegar, herbs, and spices. Do not use soy sauce, lite soy sauce, steak sauce, onion salt, garlic salt, celery salt, mustard, or ketchup on your food. · Use low-sodium salad dressings, sauces, and ketchup. Or make your own salad dressings and sauces without adding salt. · Use less salt (or none) when recipes call for it. You can often use half the salt a recipe calls for without losing flavor. Other foods such as rice, pasta, and grains do not need added salt. · Rinse canned vegetables, and cook them in fresh water. This removes somebut not allof the salt. · Avoid water that is naturally high in sodium or that has been treated with water softeners, which add sodium. Call your local water company to find out the sodium content of your water supply. If you buy bottled water, read the label and choose a sodium-free brand. Avoid high-sodium foods · Avoid eating: 
? Smoked, cured, salted, and canned meat, fish, and poultry. ? Ham, ravi, hot dogs, and luncheon meats. ? Regular, hard, and processed cheese and regular peanut butter. ? Crackers with salted tops, and other salted snack foods such as pretzels, chips, and salted popcorn. ? Frozen prepared meals, unless labeled low-sodium. ? Canned and dried soups, broths, and bouillon, unless labeled sodium-free or low-sodium. ? Canned vegetables, unless labeled sodium-free or low-sodium. ? [place] [employer] fries, pizza, tacos, and other fast foods. ? Pickles, olives, ketchup, and other condiments, especially soy sauce, unless labeled sodium-free or low-sodium. Where can you learn more? Go to http://kings-junito.info/. Enter G706 in the search box to learn more about \"Low Sodium Diet (2,000 Milligram): Care Instructions. \" Current as of: November 7, 2018 Content Version: 12.2 © 4733-2049 [employer]domain. Care instructions adapted under license by [employer] (which disclaims liability or warranty for this information). If you have questions about a medical condition or this instruction, always ask your healthcare professional. [address] any warranty or liability for your use of this information. A Healthy Lifestyle: Care Instructions Your Care Instructions A healthy lifestyle can help you feel good, stay at a healthy weight, and have plenty of energy for both work and play. A healthy lifestyle is something you can share with your whole family. A healthy lifestyle also can lower your risk for serious health problems, such as high blood pressure, heart disease, and diabetes. You can follow a few steps listed below to improve your health and the health of your family. Follow-up care is a key part of your treatment and safety. Be sure to make and go to all appointments, and call your doctor if you are having problems. It's also a good idea to know your test results and keep a list of the medicines you take. How can you care for yourself at home? · Do not eat too much sugar, fat, or fast foods. You can still have dessert and treats now and then. The goal is moderation. · Start small to improve your eating habits. Pay attention to portion sizes, drink less juice and soda pop, and eat more fruits and vegetables. ? Eat a healthy amount of food.  A 3-ounce serving of meat, for example, is about the size of a deck of cards. Fill the rest of your plate with vegetables and whole grains. ? Limit the amount of soda and sports drinks you have every day. Drink more water when you are thirsty. ? Eat at least 5 servings of fruits and vegetables every day. It may seem like a lot, but it is not hard to reach this goal. A serving or helping is 1 piece of fruit, 1 cup of vegetables, or 2 cups of leafy, raw vegetables. Have an apple or some carrot sticks as an afternoon snack instead of a candy bar. Try to have fruits and/or vegetables at every meal. 
· Make exercise part of your daily routine. You may want to start with simple activities, such as walking, bicycling, or slow swimming. Try to be active 30 to 60 minutes every day. You do not need to do all 30 to 60 minutes all at once. For example, you can exercise 3 times a day for 10 or 20 minutes. Moderate exercise is safe for most people, but it is always a good idea to talk to your doctor before starting an exercise program. 
· Keep moving. Rosy Pierson the lawn, work in the garden, or [employer]. Take the stairs instead of the elevator at work. · If you smoke, quit. People who smoke have an increased risk for heart attack, stroke, cancer, and other lung illnesses. Quitting is hard, but there are ways to boost your chance of quitting tobacco for good. ? Use nicotine gum, patches, or lozenges. ? Ask your doctor about stop-smoking programs and medicines. ? Keep trying. In addition to reducing your risk of diseases in the future, you will notice some benefits soon after you stop using tobacco. If you have shortness of breath or asthma symptoms, they will likely get better within a few weeks after you quit. · Limit how much alcohol you drink. Moderate amounts of alcohol (up to 2 drinks a day for men, 1 drink a day for women) are okay. But drinking too much can lead to liver problems, high blood pressure, and other health problems. Family health If you have a family, there are many things you can do together to improve your health. · Eat meals together as a family as often as possible. · Eat healthy foods. This includes fruits, vegetables, lean meats and dairy, and whole grains. · Include your family in your fitness plan. Most people think of activities such as jogging or tennis as the way to fitness, but there are many ways you and your family can be more active. Anything that makes you breathe hard and gets your heart pumping is exercise. Here are some tips: 
? Walk to do errands or to take your child to school or the bus. 
? Go for a family bike ride after dinner instead of watching TV. Where can you learn more? Go to http://kings-junito.info/. Enter N595 in the search box to learn more about \"A Healthy Lifestyle: Care Instructions. \" Current as of: May 28, 2019 Content Version: 12.2 © 3829-7847 [employer]. Care instructions adapted under license by [employer] (which disclaims liability or warranty for this information). If you have questions about a medical condition or this instruction, always ask your healthcare professional. [place] [address] any warranty or liability for your use of this information.

## 2020-02-20 RX ORDER — CHLORTHALIDONE 25 MG/1
TABLET ORAL
Qty: 90 TAB | Refills: 0 | OUTPATIENT
Start: 2020-02-20

## 2020-02-20 NOTE — TELEPHONE ENCOUNTER
Patient has called multiple times regarding this refill (chlorthalidone). No encounters have been sent to me. I do not see mention of why this was refused. Please clarify.

## 2020-02-20 NOTE — TELEPHONE ENCOUNTER
Pt  Called this morning and is calling again about the RX chlorthalidone (HYGROTEN) 25 mg tablet  Being refused and there is not a reason why. Can you please look in to this and call the pt back. She is aware that DR Adriana Lyles is out of the office for the rest of the day and that this will be addressed tomorrow. Please advise.

## 2020-02-21 RX ORDER — CHLORTHALIDONE 25 MG/1
25 TABLET ORAL DAILY
Qty: 90 TAB | Refills: 0 | Status: SHIPPED | OUTPATIENT
Start: 2020-02-21 | End: 2020-05-17

## 2020-02-21 NOTE — TELEPHONE ENCOUNTER
Sent medication. It was not active on the chart and was showed as not a current medication/ pt not taking.

## 2020-04-11 ENCOUNTER — APPOINTMENT (OUTPATIENT)
Dept: GENERAL RADIOLOGY | Age: 55
End: 2020-04-11
Attending: EMERGENCY MEDICINE
Payer: OTHER GOVERNMENT

## 2020-04-11 ENCOUNTER — HOSPITAL ENCOUNTER (EMERGENCY)
Age: 55
Discharge: HOME | End: 2020-04-11
Attending: EMERGENCY MEDICINE
Payer: OTHER GOVERNMENT

## 2020-04-11 VITALS
HEIGHT: 62 IN | DIASTOLIC BLOOD PRESSURE: 79 MMHG | RESPIRATION RATE: 18 BRPM | WEIGHT: 162 LBS | TEMPERATURE: 98.3 F | HEART RATE: 69 BPM | SYSTOLIC BLOOD PRESSURE: 129 MMHG | OXYGEN SATURATION: 99 % | BODY MASS INDEX: 29.81 KG/M2

## 2020-04-11 DIAGNOSIS — S39.012A BACK STRAIN, INITIAL ENCOUNTER: Primary | ICD-10-CM

## 2020-04-11 PROCEDURE — 74011250636 HC RX REV CODE- 250/636: Performed by: EMERGENCY MEDICINE

## 2020-04-11 PROCEDURE — 96372 THER/PROPH/DIAG INJ SC/IM: CPT

## 2020-04-11 PROCEDURE — 99283 EMERGENCY DEPT VISIT LOW MDM: CPT

## 2020-04-11 PROCEDURE — 74011250637 HC RX REV CODE- 250/637: Performed by: EMERGENCY MEDICINE

## 2020-04-11 PROCEDURE — 72070 X-RAY EXAM THORAC SPINE 2VWS: CPT

## 2020-04-11 RX ORDER — CYCLOBENZAPRINE HCL 5 MG
10 TABLET ORAL
Qty: 9 TAB | Refills: 0 | Status: SHIPPED | OUTPATIENT
Start: 2020-04-11 | End: 2021-01-25

## 2020-04-11 RX ORDER — CYCLOBENZAPRINE HCL 10 MG
10 TABLET ORAL
Status: COMPLETED | OUTPATIENT
Start: 2020-04-11 | End: 2020-04-11

## 2020-04-11 RX ORDER — KETOROLAC TROMETHAMINE 15 MG/ML
15 INJECTION, SOLUTION INTRAMUSCULAR; INTRAVENOUS
Status: COMPLETED | OUTPATIENT
Start: 2020-04-11 | End: 2020-04-11

## 2020-04-11 RX ORDER — NAPROXEN 500 MG/1
500 TABLET ORAL 2 TIMES DAILY WITH MEALS
Qty: 20 TAB | Refills: 0 | Status: SHIPPED | OUTPATIENT
Start: 2020-04-11 | End: 2020-07-15 | Stop reason: ALTCHOICE

## 2020-04-11 RX ORDER — KETOROLAC TROMETHAMINE 30 MG/ML
15 INJECTION, SOLUTION INTRAMUSCULAR; INTRAVENOUS
Status: DISCONTINUED | OUTPATIENT
Start: 2020-04-11 | End: 2020-04-11

## 2020-04-11 RX ADMIN — KETOROLAC TROMETHAMINE 15 MG: 15 INJECTION, SOLUTION INTRAMUSCULAR; INTRAVENOUS at 10:30

## 2020-04-11 RX ADMIN — CYCLOBENZAPRINE 10 MG: 10 TABLET, FILM COATED ORAL at 10:30

## 2020-04-11 NOTE — DISCHARGE INSTRUCTIONS
Patient Education        Back Strain: Care Instructions  Overview    A back strain happens when you overstretch, or pull, a muscle in your back. You may hurt your back in an accident or when you exercise or lift something. Sometimes you may not know how you hurt your back. Most back pain will get better with rest and time. You can take care of yourself at home to help your back heal.  Follow-up care is a key part of your treatment and safety. Be sure to make and go to all appointments, and call your doctor if you are having problems. It's also a good idea to know your test results and keep a list of the medicines you take. How can you care for yourself at home? · Try to stay as active as you can, but stop or reduce any activity that causes pain. · Put ice or a cold pack on the sore muscle for 10 to 20 minutes at a time to stop swelling. Try this every 1 to 2 hours for 3 days (when you are awake) or until the swelling goes down. Put a thin cloth between the ice pack and your skin. · After 2 or 3 days, apply a heating pad on low or a warm cloth to your back. Some doctors suggest that you go back and forth between hot and cold treatments. · Take pain medicines exactly as directed. ? If the doctor gave you a prescription medicine for pain, take it as prescribed. ? If you are not taking a prescription pain medicine, ask your doctor if you can take an over-the-counter medicine. · Try sleeping on your side with a pillow between your legs. Or put a pillow under your knees when you lie on your back. These measures can ease pain in your lower back. · Return to your usual level of activity slowly. When should you call for help? Call 911 anytime you think you may need emergency care. For example, call if:    · You are unable to move a leg at all.   [facility] your doctor now or seek immediate medical care if:    · You have new or worse symptoms in your legs, belly, or buttocks.  Symptoms may include:  ? Numbness or tingling. ? Weakness. ? Pain.     · You lose bladder or bowel control.    Watch closely for changes in your health, and be sure to contact your doctor if:    · You have a fever, lose weight, or don't feel well.     · You are not getting better as expected. Where can you learn more? Go to http://kings-junito.info/  Enter N942 in the search box to learn more about \"Back Strain: Care Instructions. \"  Current as of: June 26, 2019Content Version: 12.4  © 4091-5784 [employer]. Care instructions adapted under license by [employer] (which disclaims liability or warranty for this information). If you have questions about a medical condition or this instruction, always ask your healthcare professional. [address] any warranty or liability for your use of this information. Patient Education        Back Care and Preventing Injuries: Care Instructions  Your Care Instructions    You can hurt your back doing many everyday activities: lifting a heavy box, bending down to garden, exercising at the gym, and even getting out of bed. But you can keep your back strong and healthy by doing some exercises. You also can follow a few tips for sitting, sleeping, and lifting to avoid hurting your back again. Talk to your doctor before you start an exercise program. Ask for help if you want to learn more about keeping your back healthy. Follow-up care is a key part of your treatment and safety. Be sure to make and go to all appointments, and call your doctor if you are having problems. It's also a good idea to know your test results and keep a list of the medicines you take. How can you care for yourself at home? · Stay at a healthy weight to avoid strain on your lower back. · Do not smoke. Smoking increases the risk of osteoporosis, which weakens the spine. If you need help quitting, talk to your doctor about stop-smoking programs and medicines.  These can increase your chances of quitting for good. · Make sure you sleep in a position that maintains your back's normal curves and on a mattress that feels comfortable. Sleep on your side with a pillow between your knees, or sleep on your back with a pillow under your knees. These positions can reduce strain on your back. · When you get out of bed, lie on your side and bend both knees. Drop your feet over the edge of the bed as you push up with both arms. Scoot to the edge of the bed. Make sure your feet are in line with your rear end (buttocks), and then stand up. · If you must stand for a long time, put one foot on a stool, ledge, or box. Exercise to strengthen your back and other muscles  · Get at least 30 minutes of exercise on most days of the week. Walking is a good choice. You also may want to do other activities, such as running, swimming, cycling, or playing tennis or team sports. · Stretch your back muscles. Here are few exercises to try:  ? Lie on your back with your knees bent and your feet flat on the floor. Gently pull one bent knee to your chest. Put that foot back on the floor, and then pull the other knee to your chest. Hold for 15 to 30 seconds. Repeat 2 to 4 times. ? Do pelvic tilts. Lie on your back with your knees bent. Tighten your stomach muscles. Pull your belly button (navel) in and up toward your ribs. You should feel like your back is pressing to the floor and your hips and pelvis are slightly lifting off the floor. Hold for 6 seconds while breathing smoothly. · Keep your core muscles strong. The muscles of your back, belly (abdomen), and buttocks support your spine. ? Pull in your belly, and imagine pulling your navel toward your spine. Hold this for 6 seconds, then relax. Remember to keep breathing normally as you tense your muscles. ? Do curl-ups. Always do them with your knees bent.  Keep your low back on the floor, and curl your shoulders toward your knees using a smooth, slow motion. Keep your arms folded across your chest. If this bothers your neck, try putting your hands behind your neck (not your head), with your elbows spread apart. ? Lie on your back with your knees bent and your feet flat on the floor. Tighten your belly muscles, and then push with your feet and raise your buttocks up a few inches. Hold this position 6 seconds as you continue to breathe normally, then lower yourself slowly to the floor. Repeat 8 to 12 times. ? If you like group exercise, try Pilates or yoga. These classes have poses that strengthen the core muscles. Protect your back when you sit  · Place a small pillow, a rolled-up towel, or a lumbar roll in the curve of your back if you need extra support. · Sit in a chair that is low enough to let you place both feet flat on the floor with both knees nearly level with your hips. If your chair or desk is too high, use a foot rest to raise your knees. · When driving, keep your knees nearly level with your hips. Sit straight, and drive with both hands on the steering wheel. Your arms should be in a slightly bent position. · Try a kneeling chair, which helps tilt your hips forward. This takes pressure off your lower back. · Try sitting on an exercise ball. It can rock from side to side, which helps keep your back loose. Lift properly  · Squat down, bending at the hips and knees only. If you need to, put one knee to the floor and extend your other knee in front of you, bent at a right angle (half kneeling). · Press your chest straight forward. This helps keep your upper back straight while keeping a slight arch in your low back. · Hold the load as close to your body as possible, at the level of your navel. · Use your feet to change direction, taking small steps. · Lead with your hips as you change direction. Keep your shoulders in line with your hips as you move. Do not twist your body.   · Set down your load carefully, squatting with your knees and hips only.  When should you call for help? Watch closely for changes in your health, and be sure to contact your doctor if you have any problems. Where can you learn more? Go to http://kings-junito.info/  Enter S810 in the search box to learn more about \"Back Care and Preventing Injuries: Care Instructions. \"  Current as of: June 26, 2019Content Version: 12.4  © 1499-9311 [employer]orporated. Care instructions adapted under license by [employer] (which disclaims liability or warranty for this information). If you have questions about a medical condition or this instruction, always ask your healthcare professional. [address] any warranty or liability for your use of this information.

## 2020-04-11 NOTE — ED NOTES
Current Discharge Medication List      START taking these medications    Details   naproxen (NAPROSYN) 500 mg tablet Take 1 Tab by mouth two (2) times daily (with meals). Qty: 20 Tab, Refills: 0      cyclobenzaprine (FLEXERIL) 5 mg tablet Take 2 Tabs by mouth three (3) times daily (with meals). Qty: 9 Tab, Refills: 0           Patient armband removed and shredded. Prescriptions given to patient and reviewed.

## 2020-04-11 NOTE — ED PROVIDER NOTES
EMERGENCY DEPARTMENT HISTORY AND PHYSICAL EXAM    10:08 AM      Date: 4/11/2020  Patient Name: Jen Loera    History of Presenting Illness     Chief Complaint   Patient presents with    Back Pain         History Provided By: Patient    Additional History (Context): Jen Loera is a 53 y.o. female with hypertension and myocardial infarction who presents with upper back pain. 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 Patient took Tylenol with no improvement. Patient states this pain is sharp, nonradiating, worse with movement. Patient denies alcohol or recreational drug use. PCP: Lizet Vela MD        Current Outpatient Medications   Medication Sig Dispense Refill    naproxen (NAPROSYN) 500 mg tablet Take 1 Tab by mouth two (2) times daily (with meals). 20 Tab 0    cyclobenzaprine (FLEXERIL) 5 mg tablet Take 2 Tabs by mouth three (3) times daily (with meals). 9 Tab 0    chlorthalidone (HYGROTEN) 25 mg tablet Take 1 Tab by mouth daily. 90 Tab 0    traZODone (DESYREL) 50 mg tablet TAKE ONE TABLET BY MOUTH EVERY NIGHT (GENERIC FOR DESYREL) 90 Tab 0    atorvastatin (LIPITOR) 40 mg tablet TAKE ONE TABLET BY MOUTH EVERY NIGHT AT BEDTIME 90 Tab 1    clopidogrel (PLAVIX) 75 mg tab TAKE ONE TABLET BY MOUTH DAILY 30 Tab 11    albuterol (PROVENTIL HFA, VENTOLIN HFA, PROAIR HFA) 90 mcg/actuation inhaler Take 2 Puffs by inhalation every four (4) hours as needed for Wheezing. 1 Inhaler 0    carvedilol (COREG) 12.5 mg tablet Take 12.5 mg by mouth two (2) times a day.  aspirin 81 mg chewable tablet Take 1 Tab by mouth daily. 90 Tab 3    Cholecalciferol, Vitamin D3, 1,000 unit cap Take 1,000 Units by mouth daily.  lisinopril (PRINIVIL, ZESTRIL) 20 mg tablet Take 1 Tab by mouth daily.  (Patient taking differently: Take 10 mg by mouth daily.) 90 Tab 0    nitroglycerin (NITROSTAT) 0.4 mg SL tablet 1 Tab by SubLINGual route every five (5) minutes as needed for Chest Pain. Up to 3 doses. 1 Bottle 1       Past History     Past Medical History:  Past Medical History:   Diagnosis Date    Anemia     Hypertension     [facility]     NSTEMI (non-ST elevated myocardial infarction) ([facility]     12/2018       Past Surgical History:  Past Surgical History:   Procedure Laterality Date    HX APPENDECTOMY      HX HEART CATHETERIZATION         Family History:  Family History   Problem Relation Age of Onset    Breast Cancer Mother     Hypertension Mother     Diabetes Mother     Heart Failure Mother     Breast Cancer Sister     Breast Cancer Sister        Social History:  Social History     Tobacco Use    Smoking status: Never Smoker    Smokeless tobacco: Never Used   Substance Use Topics    Alcohol use: No     Alcohol/week: 0.0 standard drinks     Frequency: Never    Drug use: No       Allergies: Allergies   Allergen Reactions    Norvasc [Amlodipine] Swelling     Ankle swelling          Review of Systems       Review of Systems   Constitutional: Negative. Negative for chills, diaphoresis and fever. HENT: Negative. Negative for congestion, rhinorrhea and sore throat. Eyes: Negative. Negative for pain, discharge and redness. Respiratory: Negative. Negative for cough, chest tightness, shortness of breath and wheezing. Cardiovascular: Negative. Negative for chest pain. Gastrointestinal: Negative. Negative for abdominal pain, constipation, diarrhea, nausea and vomiting. Genitourinary: Negative. Negative for dysuria, flank pain, frequency, hematuria and urgency. Musculoskeletal: Negative. Negative for back pain and neck pain. Skin: Negative. Negative for rash. Neurological: Negative. Negative for syncope, weakness, numbness and headaches. Psychiatric/Behavioral: Negative. All other systems reviewed and are negative.         Physical Exam     Visit Vitals  /79 (BP 1 Location: Left arm, BP Patient Position: At rest)   Pulse 69   Temp 98.3 °F (36.8 °C)   Resp 18   Ht 5' 2\" (1.575 m)   Wt 73.5 kg (162 lb)   LMP 08/28/2017   SpO2 99%   BMI 29.63 kg/m²         Physical Exam  Vitals signs and nursing note reviewed. Constitutional:       General: She is not in acute distress. Appearance: Normal appearance. She is well-developed. She is not ill-appearing, toxic-appearing or diaphoretic. HENT:      Head: Normocephalic and atraumatic. Mouth/Throat:      Pharynx: No oropharyngeal exudate. Eyes:      General: No scleral icterus. Conjunctiva/sclera: Conjunctivae normal.      Pupils: Pupils are equal, round, and reactive to light. Neck:      Musculoskeletal: Normal range of motion and neck supple. Thyroid: No thyromegaly. Vascular: No hepatojugular reflux or JVD. Trachea: No tracheal deviation. Cardiovascular:      Rate and Rhythm: Normal rate and regular rhythm. Pulses: Normal pulses. Radial pulses are 2+ on the right side and 2+ on the left side. Dorsalis pedis pulses are 2+ on the right side and 2+ on the left side. Heart sounds: Normal heart sounds, S1 normal and S2 normal. No murmur. No gallop. No S3 or S4 sounds. Pulmonary:      Effort: Pulmonary effort is normal. No respiratory distress. Breath sounds: Normal breath sounds. No decreased breath sounds, wheezing, rhonchi or rales. Abdominal:      General: Bowel sounds are normal. There is no distension. Palpations: Abdomen is soft. Abdomen is not rigid. There is no mass. Tenderness: There is no abdominal tenderness. There is no guarding or rebound. Negative signs include Emery's sign and McBurney's sign. Musculoskeletal: Normal range of motion. Back:    Lymphadenopathy:      Head:      Right side of head: No submental, submandibular, preauricular or occipital adenopathy. Left side of head: No submental, submandibular, preauricular or occipital adenopathy. Cervical: No cervical adenopathy. Upper Body:      Right upper body: No supraclavicular adenopathy. Left upper body: No supraclavicular adenopathy. Skin:     General: Skin is warm and dry. Findings: No rash. Neurological:      Mental Status: She is alert. She is not disoriented. GCS: GCS eye subscore is 4. GCS verbal subscore is 5. GCS motor subscore is 6. Cranial Nerves: No cranial nerve deficit. Sensory: No sensory deficit. Coordination: Coordination normal.      Gait: Gait normal.      Deep Tendon Reflexes: Reflexes are normal and symmetric. Psychiatric:         Speech: Speech normal.         Behavior: Behavior normal.         Thought Content: Thought content normal.         Judgment: Judgment normal.           Diagnostic Study Results     Labs -  No results found for this or any previous visit (from the past 12 hour(s)). Radiologic Studies -   XR SPINE THORAC 2 V   Final Result   IMPRESSION:    No definite acute displaced fracture. Medical Decision Making   Provider Notes (Medical Decision Making):  MDM  Number of Diagnoses or Management Options  Diagnosis management comments: Back strain  Rib fracture  Patient is 58-year-old -American female presents with pain on her upper right side. Will order thoracic spine to rule out any metastatic lesions. I am the first provider for this patient. I reviewed the vital signs, available nursing notes, past medical history, past surgical history, family history and social history. Vital Signs-Reviewed the patient's vital signs. Records Reviewed: Nursing Notes (Time of Review: 10:08 AM)    ED Course: Progress Notes, Reevaluation, and Consults:    Thoracic X-Ray showed No acute process. 10:28 AM 4/11/2020        Diagnosis       I have reassessed the patient. Patient is feeling well. Patient will be prescribed Naproxen and Flexeril. Patient was discharged in stable condition. Patient is to return to emergency department if any new or worsening condition. Clinical Impression:   1. Back strain, initial encounter        Disposition: Discharged home     Follow-up Information     Follow up With Specialties Details Why Contact Katelyn Ariza MD [facility] In 2 days  [address] [place] [address]  [phone]               Attestation        Provider Attestation:     I personally performed the services described in the documentation, reviewed the documentation and it accurately and completely records my words and actions utilizing the [address] April 11, 2020 at 11:53 AM - [employer], DO    Disclaimer. It is dictated using utilizing voice recognition software. Unfortunately this leads to occasional typographical errors. I apologize in advance if the situation occurs. If questions arise please do not hesitate to contact me or call our department.

## 2020-04-11 NOTE — ED TRIAGE NOTES
Patient c/o right upper back pain that began yesterday after lifting a patient. Patient states pain worsens with movement of upper right extremity.  She voices concerns related to underlying heart hx and prior MI.

## 2020-05-11 DIAGNOSIS — G47.9 SLEEP TROUBLE: ICD-10-CM

## 2020-05-12 RX ORDER — TRAZODONE HYDROCHLORIDE 50 MG/1
TABLET ORAL
Qty: 90 TAB | Refills: 0 | Status: SHIPPED | OUTPATIENT
Start: 2020-05-12 | End: 2020-08-11 | Stop reason: SDUPTHER

## 2020-05-17 RX ORDER — CHLORTHALIDONE 25 MG/1
TABLET ORAL
Qty: 90 TAB | Refills: 0 | Status: SHIPPED | OUTPATIENT
Start: 2020-05-17 | End: 2020-08-17

## 2020-07-04 ENCOUNTER — HOSPITAL ENCOUNTER (EMERGENCY)
Age: 55
Discharge: HOME | End: 2020-07-04
Attending: EMERGENCY MEDICINE
Payer: OTHER GOVERNMENT

## 2020-07-04 VITALS
BODY MASS INDEX: 30.96 KG/M2 | TEMPERATURE: 98.1 F | DIASTOLIC BLOOD PRESSURE: 59 MMHG | WEIGHT: 164 LBS | OXYGEN SATURATION: 100 % | RESPIRATION RATE: 18 BRPM | HEIGHT: 61 IN | HEART RATE: 58 BPM | SYSTOLIC BLOOD PRESSURE: 130 MMHG

## 2020-07-04 DIAGNOSIS — S39.012A STRAIN OF LUMBAR REGION, INITIAL ENCOUNTER: Primary | ICD-10-CM

## 2020-07-04 PROCEDURE — 99283 EMERGENCY DEPT VISIT LOW MDM: CPT

## 2020-07-04 PROCEDURE — 74011000250 HC RX REV CODE- 250: Performed by: EMERGENCY MEDICINE

## 2020-07-04 PROCEDURE — 74011250637 HC RX REV CODE- 250/637: Performed by: EMERGENCY MEDICINE

## 2020-07-04 RX ORDER — TRAMADOL HYDROCHLORIDE 50 MG/1
50 TABLET ORAL
Status: COMPLETED | OUTPATIENT
Start: 2020-07-04 | End: 2020-07-04

## 2020-07-04 RX ORDER — KETOROLAC TROMETHAMINE 15 MG/ML
15 INJECTION, SOLUTION INTRAMUSCULAR; INTRAVENOUS
Status: DISCONTINUED | OUTPATIENT
Start: 2020-07-04 | End: 2020-07-04

## 2020-07-04 RX ORDER — IBUPROFEN 600 MG/1
600 TABLET ORAL
Qty: 20 TAB | Refills: 0 | Status: SHIPPED | OUTPATIENT
Start: 2020-07-04 | End: 2020-07-15 | Stop reason: ALTCHOICE

## 2020-07-04 RX ORDER — LIDOCAINE 50 MG/G
PATCH TOPICAL
Qty: 15 EACH | Refills: 0 | Status: SHIPPED | OUTPATIENT
Start: 2020-07-04 | End: 2021-01-25

## 2020-07-04 RX ORDER — LIDOCAINE 4 G/100G
2 PATCH TOPICAL
Status: DISCONTINUED | OUTPATIENT
Start: 2020-07-04 | End: 2020-07-05 | Stop reason: HOSPADM

## 2020-07-04 RX ADMIN — TRAMADOL HYDROCHLORIDE 50 MG: 50 TABLET, FILM COATED ORAL at 22:25

## 2020-07-04 NOTE — LETTER
[address] [place] [address] Dr [facility] 
[address] [facility] [id number] 
[phone] Work/School Note Date: 7/4/2020 To Whom It May concern: 
 
Susanna Rosado was seen and treated today in the emergency room by the following provider(s): 
No providers found. Susanna Rosado may return to work on 07/07/2020. Sincerely, 
 
 
 
/serenity

## 2020-07-05 NOTE — ED NOTES
Patient up for discharge. Discharge results have been reviewed with patient by the Provider. Discharge Medications discussed with patient to   include the medication, dose, frequency and purpose, last dose administered and when the next dose is due to be taken, and side effects. Armband removed and shredded per policy. Encouraged to voice any concerns, and all concerns addressed. Patient discharged in stable condition with no apparent distress.

## 2020-07-05 NOTE — ED TRIAGE NOTES
Pt. 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

## 2020-07-05 NOTE — DISCHARGE INSTRUCTIONS

## 2020-07-05 NOTE — ED PROVIDER NOTES
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 She was pushing a heavy patient via stretcher 2 days ago and pulled her back out. Patient complained having intermittent back pain since then. No other complaints.     Past Medical History:   Diagnosis Date    Anemia     Hypertension     [facility] [address]     [facility]STEMI (non-ST elevated [facility] infarction) ([facility]     12/2018       Past Surgical History:   Procedure Laterality Date    HX APPENDECTOMY      HX HEART CATHETERIZATION           Family History:   Problem Relation Age of Onset    Breast Cancer Mother     Hypertension Mother     Diabetes Mother     Heart Failure Mother     Breast Cancer Sister     Breast Cancer Sister        Social History     Socioeconomic History    Marital status:      Spouse name: Not on file    Number of children: Not on file    Years of education: Not on file    Highest education level: Not on file   Occupational History    Not on file   Social Needs    Financial resource strain: Not on file    Food insecurity     Worry: Not on file     Inability: Not on file    Transportation needs     Medical: Not on file     Non-medical: Not on file   Tobacco Use    Smoking status: Never Smoker    Smokeless tobacco: Never Used   Substance and Sexual Activity    Alcohol use: No     Alcohol/week: 0.0 standard drinks     Frequency: Never    Drug use: No    Sexual activity: Yes     Partners: Male     Birth control/protection: Surgical     Comment: tubal ligation   Lifestyle    Physical activity     Days per week: Not on file     Minutes per session: Not on file    Stress: Not on file   Relationships    Social connections     Talks on phone: Not on file     Gets together: Not on file     Attends Holiness service: Not on file     Active member of club or organization: Not on file     Attends meetings of clubs or organizations: Not on file     Relationship status: Not on file    Intimate partner violence     Fear of current or ex partner: Not on file     Emotionally abused: Not on file     Physically abused: Not on file     Forced sexual activity: Not on file   Other Topics Concern    Not on file   Social History Narrative    Not on file         ALLERGIES: Norvasc [amlodipine]    Review of Systems   Constitutional: Negative. HENT: Negative. Eyes: Negative. Respiratory: Negative. Cardiovascular: Negative. Gastrointestinal: Negative. Endocrine: Negative. Genitourinary: Negative. Musculoskeletal: Positive for back pain (5/10). Skin: Negative. Allergic/Immunologic: Negative. Neurological: Negative. Hematological: Negative. Psychiatric/Behavioral: Negative. All other systems reviewed and are negative. Vitals:    07/04/20 2149   BP: 130/59   Pulse: (!) 58   Resp: 18   Temp: 98.1 °F (36.7 °C)   SpO2: 100%   Weight: 74.4 kg (164 lb)   Height: 5' 1\" (1.549 m)            Physical Exam  Vitals signs and nursing note reviewed. Constitutional:       General: She is not in acute distress. Appearance: She is well-developed. She is not diaphoretic. HENT:      Head: Normocephalic. Right Ear: External ear normal.      Left Ear: External ear normal.      Mouth/Throat:      Pharynx: No oropharyngeal exudate. Eyes:      General: No scleral icterus. Right eye: No discharge. Left eye: No discharge. Conjunctiva/sclera: Conjunctivae normal.      Pupils: Pupils are equal, round, and reactive to light. Neck:      Musculoskeletal: Normal range of motion and neck supple. Thyroid: No thyromegaly. Vascular: No JVD. Trachea: No tracheal deviation. Cardiovascular:      Rate and Rhythm: Normal rate and regular rhythm. Heart sounds: Normal heart sounds. No murmur. No friction rub. No gallop. Pulmonary:      Effort: Pulmonary effort is normal. No respiratory distress. Breath sounds: Normal breath sounds. No stridor. No wheezing or rales. Chest:      Chest wall: No tenderness. Abdominal:      General: Bowel sounds are normal. There is no distension. Palpations: Abdomen is soft. There is no mass. Tenderness: There is no abdominal tenderness. There is no guarding or rebound. Musculoskeletal: Normal range of motion. General: Tenderness (minimal  over L spine) present. Lymphadenopathy:      Cervical: No cervical adenopathy. Skin:     General: Skin is warm and dry. Coloration: Skin is not pale. Findings: No erythema or rash. Neurological:      Mental Status: She is alert and oriented to person, place, and time. Cranial Nerves: No cranial nerve deficit. Motor: No abnormal muscle tone. Coordination: Coordination normal.      Deep Tendon Reflexes: Reflexes normal.          MDM       Procedures    Dx: acute lumbar strain    Disp: F/U employee health in 2 days. Rx: motrin, Lidoderm patches. Return to re prn. Dictation disclaimer:  Please note that this dictation was completed with Synchronized, the computer voice recognition software. Quite often unanticipated grammatical, syntax, homophones, and other interpretive errors are inadvertently transcribed by the computer software. Please disregard these errors. Please excuse any errors that have escaped final proofreading.

## 2020-07-15 RX ORDER — CLOPIDOGREL BISULFATE 75 MG/1
TABLET ORAL
Qty: 30 TAB | Refills: 3 | Status: SHIPPED | OUTPATIENT
Start: 2020-07-15 | End: 2020-09-25

## 2020-08-11 DIAGNOSIS — G47.9 SLEEP TROUBLE: ICD-10-CM

## 2020-08-11 NOTE — TELEPHONE ENCOUNTER
This pharmacy faxed over request for the following prescriptions to be filled:    Medication requested :   Requested Prescriptions     Pending Prescriptions Disp Refills    traZODone (DESYREL) 50 mg tablet 90 Tab 0     Sig: TAKE ONE TABLET BY MOUTH EVERY NIGHT     PCP: Katerina or Print: [facility]   Mail order or Local pharmacy [address] [place] [address]     Scheduled appointment if not seen by current providers in office: LOV 1/24/2020 f/u 9/15/2020 Jessica Perales

## 2020-08-12 RX ORDER — TRAZODONE HYDROCHLORIDE 50 MG/1
TABLET ORAL
Qty: 90 TAB | Refills: 1 | Status: SHIPPED | OUTPATIENT
Start: 2020-08-12 | End: 2021-02-08

## 2020-09-14 ENCOUNTER — OFFICE VISIT (OUTPATIENT)
Dept: FAMILY MEDICINE CLINIC | Age: 55
End: 2020-09-14

## 2020-09-14 VITALS
BODY MASS INDEX: 30.66 KG/M2 | SYSTOLIC BLOOD PRESSURE: 110 MMHG | OXYGEN SATURATION: 99 % | RESPIRATION RATE: 16 BRPM | HEART RATE: 62 BPM | DIASTOLIC BLOOD PRESSURE: 70 MMHG | WEIGHT: 162.4 LBS | TEMPERATURE: 98.8 F | HEIGHT: 61 IN

## 2020-09-14 DIAGNOSIS — D50.8 OTHER IRON DEFICIENCY ANEMIA: ICD-10-CM

## 2020-09-14 DIAGNOSIS — M25.511 RIGHT SHOULDER PAIN, UNSPECIFIED CHRONICITY: ICD-10-CM

## 2020-09-14 DIAGNOSIS — R92.2 DENSE BREAST: ICD-10-CM

## 2020-09-14 DIAGNOSIS — M54.9 DISCOMFORT OF BACK: ICD-10-CM

## 2020-09-14 DIAGNOSIS — I10 ESSENTIAL HYPERTENSION: ICD-10-CM

## 2020-09-14 DIAGNOSIS — I25.10 CORONARY ARTERY DISEASE INVOLVING NATIVE CORONARY ARTERY OF NATIVE HEART WITHOUT ANGINA PECTORIS: Primary | ICD-10-CM

## 2020-09-14 PROBLEM — I24.9 ACUTE CORONARY SYNDROME (HCC): Status: RESOLVED | Noted: 2018-12-07 | Resolved: 2020-09-14

## 2020-09-14 NOTE — PROGRESS NOTES
1. Have you been to the ER, urgent care clinic since your last visit? Hospitalized since your last visit? [facility] 7/04/2020 and 4/11/2020    2. Have you seen or consulted any other health care providers outside of the [address] [place] [address] since your last visit? Include any pap smears or colon screening.  No    Chief Complaint   Patient presents with    Blood sugar problem    Hypertension    Vitamin D Deficiency    Other     NSTEMI    Medication Refill     Naproxen 500 mg

## 2020-09-14 NOTE — PROGRESS NOTES
HISTORY OF PRESENT ILLNESS  Gio Childers is a 63 y.o. female. HPI: Here for routine follow-up  History of non-ST elevation MI. Coronary risk factor management. She used to have a specialist at [facility]. Now pending appointment with the specialist to establish locally. Denies any anginal symptoms. Sitting on exam table comfortably and did not appear in any acute distress. No need for any sublingual nitroglycerin. History of hypertension. Taking medication with compliance. No side effects. Also today vitals been stable  Denies any headache, dizziness, no chest pain or trouble breathing, no arm or leg weakness. No nausea or vomiting, no weight or appetite changes, no mood changes . No urine or bowel complains, no palpitation, no diaphoresis. No abdominal pain. No cold or cough. No leg swelling. No fever. No sleep trouble. Visit Vitals  /70 (BP 1 Location: Left arm, BP Patient Position: Sitting)   Pulse 62   Temp 98.8 °F (37.1 °C) (Oral)   Resp 16   Ht 5' 1\" (1.549 m)   Wt 162 lb 6.4 oz (73.7 kg)   SpO2 99%   BMI 30.69 kg/m²     She is overdue for labs. She will complete the labs before next visit. History of anemia. None colonoscopy in advised to repeat in 5 years. Currently not on any supplement. Following hematology recommendations. She is up-to-date on her mammogram  Last visit had a right shoulder pain which has been improved at this time and also noted ER visit  regarding back discomfort which has been stable as well.   Again sitting comfortably and did not appear in any acute distress  Lab Results   Component Value Date/Time    WBC 6.9 07/24/2019 07:41 AM    HGB 10.6 (L) 07/24/2019 07:41 AM    HCT 32.1 (L) 07/24/2019 07:41 AM    PLATELET 143 53/68/3585 07:41 AM    MCV 89.4 07/24/2019 07:41 AM     Lab Results   Component Value Date/Time    Sodium 138 06/05/2019 08:26 AM    Potassium 4.9 06/05/2019 08:26 AM    Chloride 102 06/05/2019 08:26 AM    CO2 28 06/05/2019 08:26 AM    Anion gap 8 06/05/2019 08:26 AM    Glucose 106 (H) 06/05/2019 08:26 AM    BUN 26 (H) 06/05/2019 08:26 AM    Creatinine 1.06 06/05/2019 08:26 AM    BUN/Creatinine ratio 25 (H) 06/05/2019 08:26 AM    GFR est AA >60 06/05/2019 08:26 AM    GFR est non-AA 54 (L) 06/05/2019 08:26 AM    Calcium 9.7 06/05/2019 08:26 AM    Bilirubin, total 0.4 06/05/2019 08:26 AM    Alk. phosphatase 135 (H) 06/05/2019 08:26 AM    Protein, total 7.7 06/05/2019 08:26 AM    Albumin 4.0 06/05/2019 08:26 AM    Globulin 3.7 06/05/2019 08:26 AM    A-G Ratio 1.1 06/05/2019 08:26 AM    ALT (SGPT) 40 06/05/2019 08:26 AM    AST (SGOT) 16 06/05/2019 08:26 AM     Lab Results   Component Value Date/Time    Cholesterol, total 131 06/05/2019 08:26 AM    HDL Cholesterol 42 06/05/2019 08:26 AM    LDL, calculated 68.8 06/05/2019 08:26 AM    VLDL, calculated 20.2 06/05/2019 08:26 AM    Triglyceride 101 06/05/2019 08:26 AM    CHOL/HDL Ratio 3.1 06/05/2019 08:26 AM     Lab Results   Component Value Date/Time    TSH 1.040 06/09/2014 12:00 AM     Lab Results   Component Value Date/Time    Hemoglobin A1c 5.7 (H) 05/24/2017 09:25 AM     Lab Results   Component Value Date/Time    Vitamin D 25-Hydroxy 40.6 05/24/2017 09:25 AM           ROS: See HPI    Physical Exam  Constitutional:       General: She is not in acute distress. Neck:      Musculoskeletal: Neck supple. Cardiovascular:      Rate and Rhythm: Normal rate and regular rhythm. Heart sounds: Normal heart sounds. Abdominal:      General: Bowel sounds are normal.      Palpations: Abdomen is soft. Tenderness: There is no abdominal tenderness. Lymphadenopathy:      Cervical: No cervical adenopathy. Neurological:      Mental Status: She is oriented to person, place, and time. Psychiatric:         Behavior: Behavior normal.         ASSESSMENT and PLAN    ICD-10-CM ICD-9-CM    1. Coronary artery disease involving native coronary artery of native heart without angina pectoris: Risk factor management.   Now has pending appointment to establish care with a local cardiologist.  On aspirin and Plavix. Also taking statin. Will follow cardiology recommendation I25.10 414.01     NSTEMI . specialist at [facility] now transferring care to local    2. Essential hypertension:well controlled. Continue current dose of medication and low salt diet. Exercise as tolerated. I10 401.9    3. Other iron deficiency anemia: Not on any supplement. Following hematology recommendation. Shanon Ac recently had done labs for them. No results available. She will complete ordered labs as well D50.8 280.8     following hematology. no more on iron supplement per hematology    4. Dense breast/ next year 3D mammogram: Up-to-date with mammogram no concern R92.2 793.82    5. Right shoulder pain, unspecified chronicity: Asymptomatic at this time. Will observe M25.511 719.41    6. Discomfort of back: Asymptomatic at this time will observe M54.9 724.5    Patient understood and agreed with above plan  Review health maintenance  Up-to-date with the Pap smear, mammogram and colonoscopy  Follow-up and Dispositions    · Return in about 4 months (around 1/14/2021).

## 2020-09-14 NOTE — PATIENT INSTRUCTIONS
A Healthy Lifestyle: Care Instructions Your Care Instructions A healthy lifestyle can help you feel good, stay at a healthy weight, and have plenty of energy for both work and play. A healthy lifestyle is something you can share with your whole family. A healthy lifestyle also can lower your risk for serious health problems, such as high blood pressure, heart disease, and diabetes. You can follow a few steps listed below to improve your health and the health of your family. Follow-up care is a key part of your treatment and safety. Be sure to make and go to all appointments, and call your doctor if you are having problems. It's also a good idea to know your test results and keep a list of the medicines you take. How can you care for yourself at home? · Do not eat too much sugar, fat, or fast foods. You can still have dessert and treats now and then. The goal is moderation. · Start small to improve your eating habits. Pay attention to portion sizes, drink less juice and soda pop, and eat more fruits and vegetables. ? Eat a healthy amount of food. A 3-ounce serving of meat, for example, is about the size of a deck of cards. Fill the rest of your plate with vegetables and whole grains. ? Limit the amount of soda and sports drinks you have every day. Drink more water when you are thirsty. ? Eat at least 5 servings of fruits and vegetables every day. It may seem like a lot, but it is not hard to reach this goal. A serving or helping is 1 piece of fruit, 1 cup of vegetables, or 2 cups of leafy, raw vegetables. Have an apple or some carrot sticks as an afternoon snack instead of a candy bar. Try to have fruits and/or vegetables at every meal. 
· Make exercise part of your daily routine. You may want to start with simple activities, such as walking, bicycling, or slow swimming. Try to be active 30 to 60 minutes every day.  You do not need to do all 30 to 60 minutes all at once. For example, you can exercise 3 times a day for 10 or 20 minutes. Moderate exercise is safe for most people, but it is always a good idea to talk to your doctor before starting an exercise program. 
· Keep moving. Livier Chock the lawn, work in the garden, or [employer]. Take the stairs instead of the elevator at work. · If you smoke, quit. People who smoke have an increased risk for heart attack, stroke, cancer, and other lung illnesses. Quitting is hard, but there are ways to boost your chance of quitting tobacco for good. ? Use nicotine gum, patches, or lozenges. ? Ask your doctor about stop-smoking programs and medicines. ? Keep trying. In addition to reducing your risk of diseases in the future, you will notice some benefits soon after you stop using tobacco. If you have shortness of breath or asthma symptoms, they will likely get better within a few weeks after you quit. · Limit how much alcohol you drink. Moderate amounts of alcohol (up to 2 drinks a day for men, 1 drink a day for women) are okay. But drinking too much can lead to liver problems, high blood pressure, and other health problems. Family health If you have a family, there are many things you can do together to improve your health. · Eat meals together as a family as often as possible. · Eat healthy foods. This includes fruits, vegetables, lean meats and dairy, and whole grains. · Include your family in your fitness plan. Most people think of activities such as jogging or tennis as the way to fitness, but there are many ways you and your family can be more active. Anything that makes you breathe hard and gets your heart pumping is exercise. Here are some tips: 
? Walk to do errands or to take your child to school or the bus. 
? Go for a family bike ride after dinner instead of watching TV. Where can you learn more? Go to http://www.[employer]/ Enter Y870 in the search box to learn more about \"A Healthy Lifestyle: Care Instructions. \" Current as of: January 31, 2020               Content Version: 12.6 © 2006-2020 [employer]. Care instructions adapted under license by [employer] (which disclaims liability or warranty for this information). If you have questions about a medical condition or this instruction, always ask your healthcare professional. [address] any warranty or liability for your use of this information. Low Sodium Diet (2,000 Milligram): Care Instructions Your Care Instructions Too much sodium causes your body to hold on to extra water. This can raise your blood pressure and force your heart and kidneys to work harder. In very serious cases, this could cause you to be put in the hospital. It might even be life-threatening. By limiting sodium, you will feel better and lower your risk of serious problems. The most common source of sodium is salt. People get most of the salt in their diet from canned, prepared, and packaged foods. Fast food and restaurant meals also are very high in sodium. Your doctor will probably limit your sodium to less than 2,000 milligrams (mg) a day. This limit counts all the sodium in prepared and packaged foods and any salt you add to your food. Follow-up care is a key part of your treatment and safety. Be sure to make and go to all appointments, and call your doctor if you are having problems. It's also a good idea to know your test results and keep a list of the medicines you take. How can you care for yourself at home? Read food labels · Read labels on cans and food packages. The labels tell you how much sodium is in each serving. Make sure that you look at the serving size. If you eat more than the serving size, you have eaten more sodium. · Food labels also tell you the Percent Daily Value for sodium.  Choose products with low Percent Daily Values for sodium. · Be aware that sodium can come in forms other than salt, including monosodium glutamate (MSG), sodium citrate, and sodium bicarbonate (baking soda). MSG is often added to Asian food. When you eat out, you can sometimes ask for food without MSG or added salt. Buy low-sodium foods · Buy foods that are labeled \"unsalted\" (no salt added), \"sodium-free\" (less than 5 mg of sodium per serving), or \"low-sodium\" (less than 140 mg of sodium per serving). Foods labeled \"reduced-sodium\" and \"light sodium\" may still have too much sodium. Be sure to read the label to see how much sodium you are getting. · Buy fresh vegetables, or frozen vegetables without added sauces. Buy low-sodium versions of canned vegetables, soups, and other canned goods. Prepare low-sodium meals · Cut back on the amount of salt you use in cooking. This will help you adjust to the taste. Do not add salt after cooking. One teaspoon of salt has about 2,300 mg of sodium. · Take the salt shaker off the table. · Flavor your food with garlic, lemon juice, onion, vinegar, herbs, and spices. Do not use soy sauce, lite soy sauce, steak sauce, onion salt, garlic salt, celery salt, mustard, or ketchup on your food. · Use low-sodium salad dressings, sauces, and ketchup. Or make your own salad dressings and sauces without adding salt. · Use less salt (or none) when recipes call for it. You can often use half the salt a recipe calls for without losing flavor. Other foods such as rice, pasta, and grains do not need added salt. · Rinse canned vegetables, and cook them in fresh water. This removes somebut not allof the salt. · Avoid water that is naturally high in sodium or that has been treated with water softeners, which add sodium. Call your local water company to find out the sodium content of your water supply. If you buy bottled water, read the label and choose a sodium-free brand. Avoid high-sodium foods · Avoid eating: 
? Smoked, cured, salted, and canned meat, fish, and poultry. ? Ham, ravi, hot dogs, and luncheon meats. ? Regular, hard, and processed cheese and regular peanut butter. ? Crackers with salted tops, and other salted snack foods such as pretzels, chips, and salted popcorn. ? Frozen prepared meals, unless labeled low-sodium. ? Canned and dried soups, broths, and bouillon, unless labeled sodium-free or low-sodium. ? Canned vegetables, unless labeled sodium-free or low-sodium. ? [place] fries, pizza, tacos, and other fast foods. ? Pickles, olives, ketchup, and other condiments, especially soy sauce, unless labeled sodium-free or low-sodium. Where can you learn more? Go to http://www.gray.com/ Enter T624 in the search box to learn more about \"Low Sodium Diet (2,000 Milligram): Care Instructions. \" Current as of: August 22, 2019               Content Version: 12.6 © 6840-4868 [employer]. Care instructions adapted under license by [employer] (which disclaims liability or warranty for this information). If you have questions about a medical condition or this instruction, always ask your healthcare professional. [address] any warranty or liability for your use of this information.

## 2020-09-25 ENCOUNTER — OFFICE VISIT (OUTPATIENT)
Dept: CARDIOLOGY CLINIC | Age: 55
End: 2020-09-25
Payer: OTHER GOVERNMENT

## 2020-09-25 ENCOUNTER — HOSPITAL ENCOUNTER (OUTPATIENT)
Dept: LAB | Age: 55
Discharge: HOME | End: 2020-09-25
Payer: OTHER GOVERNMENT

## 2020-09-25 VITALS
HEIGHT: 61 IN | DIASTOLIC BLOOD PRESSURE: 72 MMHG | OXYGEN SATURATION: 96 % | WEIGHT: 163 LBS | HEART RATE: 55 BPM | BODY MASS INDEX: 30.78 KG/M2 | SYSTOLIC BLOOD PRESSURE: 112 MMHG

## 2020-09-25 DIAGNOSIS — I21.4 NSTEMI (NON-ST ELEVATED MYOCARDIAL INFARCTION) (HCC): ICD-10-CM

## 2020-09-25 DIAGNOSIS — I10 ESSENTIAL HYPERTENSION: ICD-10-CM

## 2020-09-25 DIAGNOSIS — I21.4 NSTEMI (NON-ST ELEVATED MYOCARDIAL INFARCTION) (HCC): Primary | ICD-10-CM

## 2020-09-25 DIAGNOSIS — I20.1 VASOSPASTIC ANGINA (HCC): ICD-10-CM

## 2020-09-25 DIAGNOSIS — E61.1 IRON DEFICIENCY: ICD-10-CM

## 2020-09-25 DIAGNOSIS — R73.03 PREDIABETES: ICD-10-CM

## 2020-09-25 LAB
ALBUMIN SERPL-MCNC: 4.1 G/DL (ref 3.4–5)
ALBUMIN/GLOB SERPL: 1.3 {RATIO} (ref 0.8–1.7)
ALP SERPL-CCNC: 104 U/L (ref 45–117)
ALT SERPL-CCNC: 24 U/L (ref 13–56)
ANION GAP SERPL CALC-SCNC: 3 MMOL/L (ref 3–18)
AST SERPL-CCNC: 16 U/L (ref 10–38)
BILIRUB SERPL-MCNC: 0.5 MG/DL (ref 0.2–1)
BUN SERPL-MCNC: 17 MG/DL (ref 7–18)
BUN/CREAT SERPL: 17 (ref 12–20)
CALCIUM SERPL-MCNC: 9.4 MG/DL (ref 8.5–10.1)
CHLORIDE SERPL-SCNC: 107 MMOL/L (ref 100–111)
CHOLEST SERPL-MCNC: 138 MG/DL
CO2 SERPL-SCNC: 31 MMOL/L (ref 21–32)
CREAT SERPL-MCNC: 1.01 MG/DL (ref 0.6–1.3)
ERYTHROCYTE [DISTWIDTH] IN BLOOD BY AUTOMATED COUNT: 13.5 % (ref 11.6–14.5)
EST. AVERAGE GLUCOSE BLD GHB EST-MCNC: 123 MG/DL
GLOBULIN SER CALC-MCNC: 3.1 G/DL (ref 2–4)
GLUCOSE SERPL-MCNC: 89 MG/DL (ref 74–99)
HBA1C MFR BLD: 5.9 % (ref 4.2–5.6)
HCT VFR BLD AUTO: 33 % (ref 35–45)
HDLC SERPL-MCNC: 46 MG/DL (ref 40–60)
HDLC SERPL: 3 {RATIO} (ref 0–5)
HGB BLD-MCNC: 10.5 G/DL (ref 12–16)
IRON SATN MFR SERPL: 23 % (ref 20–50)
IRON SERPL-MCNC: 62 UG/DL (ref 50–175)
LDLC SERPL CALC-MCNC: 69.6 MG/DL (ref 0–100)
LIPID PROFILE,FLP: NORMAL
MCH RBC QN AUTO: 28.2 PG (ref 24–34)
MCHC RBC AUTO-ENTMCNC: 31.8 G/DL (ref 31–37)
MCV RBC AUTO: 88.7 FL (ref 74–97)
PLATELET # BLD AUTO: 330 K/UL (ref 135–420)
PMV BLD AUTO: 11.4 FL (ref 9.2–11.8)
POTASSIUM SERPL-SCNC: 4.3 MMOL/L (ref 3.5–5.5)
PROT SERPL-MCNC: 7.2 G/DL (ref 6.4–8.2)
RBC # BLD AUTO: 3.72 M/UL (ref 4.2–5.3)
SODIUM SERPL-SCNC: 141 MMOL/L (ref 136–145)
TIBC SERPL-MCNC: 265 UG/DL (ref 250–450)
TRIGL SERPL-MCNC: 112 MG/DL (ref ?–150)
VLDLC SERPL CALC-MCNC: 22.4 MG/DL
WBC # BLD AUTO: 6.4 K/UL (ref 4.6–13.2)

## 2020-09-25 PROCEDURE — 99214 OFFICE O/P EST MOD 30 MIN: CPT | Performed by: INTERNAL MEDICINE

## 2020-09-25 PROCEDURE — 83036 HEMOGLOBIN GLYCOSYLATED A1C: CPT

## 2020-09-25 PROCEDURE — 80053 COMPREHEN METABOLIC PANEL: CPT

## 2020-09-25 PROCEDURE — 80061 LIPID PANEL: CPT

## 2020-09-25 PROCEDURE — 36415 COLL VENOUS BLD VENIPUNCTURE: CPT

## 2020-09-25 PROCEDURE — 83540 ASSAY OF IRON: CPT

## 2020-09-25 PROCEDURE — 85027 COMPLETE CBC AUTOMATED: CPT

## 2020-09-25 PROCEDURE — 93000 ELECTROCARDIOGRAM COMPLETE: CPT | Performed by: INTERNAL MEDICINE

## 2020-09-25 NOTE — PROGRESS NOTES
Vishnu Sanabria presents today for   Chief Complaint   Patient presents with    Follow-up     1 year follow up        Vishnu Sanabria preferred language for health care discussion is english/other. Is someone accompanying this pt? no    Is the patient using any DME equipment during [address]? no    Depression Screening:  3 most recent PHQ Screens 9/25/2020   Little interest or pleasure in doing things Not at all   Feeling down, depressed, irritable, or hopeless Not at all   Total Score PHQ 2 0       Learning Assessment:  Learning Assessment 9/14/2020   PRIMARY LEARNER Patient   HIGHEST LEVEL OF EDUCATION - PRIMARY LEARNER  DID NOT GRADUATE [facility] PRIMARY LEARNER NONE   CO-LEARNER CAREGIVER No   PRIMARY LANGUAGE ENGLISH    NEED No   LEARNER PREFERENCE PRIMARY LISTENING     VIDEOS   LEARNING SPECIAL TOPICS No   ANSWERED BY patient   RELATIONSHIP SELF       Abuse Screening:  Abuse Screening Questionnaire 9/25/2020   Do you ever feel afraid of your partner? N   Are you in a relationship with someone who physically or mentally threatens you? N   Is it safe for you to go home? Y       Fall Risk  Fall Risk Assessment, last 12 mths 9/25/2020   Able to walk? Yes   Fall in past 12 months? No       Pt currently taking Anticoagulant therapy? ASA 81mg every day and Plavix     Coordination of Care:  1. Have you been to the ER, urgent care clinic since your last visit? Hospitalized since your last visit? no    2. Have you seen or consulted any other health care providers outside of the [address] [place] [address] since your last visit? Include any pap smears or colon screening.  no

## 2020-09-25 NOTE — PROGRESS NOTES
Vishnu Sanabria    Chief Complaint   Patient presents with    Follow-up     1 year follow up     Shortness of Breath     exertion   Hospital f/u NSTEMI/ nonatherosclerotic MI    HPI    Vishnu Sanabria is a 63 y.o.  perimenopausal woman with HTN, presented to 2018 ER c/o URI symptoms including CP- ended up NSTEMI, initial cath called \"normal coronaries. \" Due to continued pain, another cath few days later revealed an occluded mid/ distal OM2. Also FMD of right iliac artery noted. Her EF remained normal without any WMA. I reviewed her hospital records in detail, and have elaborated the findings:    · NSTEMI with troponin up to 7.66, initial ECG without new ischemic changes, presented with CP. Recent NSTEMI with peak troponin 13.4 with nonobstructive CAD by cath as noted below. · Nonobstructive CAD by cath (LM normal, LAD minimal luminal irregularities, left Cx normal, RCA normal) by Dr. Soraida Cortes 2018. Possible transient thrombus vs vasospasm. · Hypertension with elevated BP on admission. · Upper respiratory infection, recent cough and congestion with low grade fever 99.5 without leukocytosis. · Normal LV function 56-60% by echo 2018 without WMA 2018. · Mild to moderate tricuspid valve regurgitation with mild pulmonary hypertension PASP 43mmHg by echo 2018. She was discharged on medical therapy (included statin, BB, CCB, plavix). She was tried on Imdur but it gave her headaches. Since the hospitalization, she is significantly better but says almost every night she could feel some chest pressure. It goes away as soon as she takes her night medications. Otherwise, no LE swelling, no SOB. She has just started walking again but hasnt returned to work. She works in home care. Since I last saw patient she went for a second opinion at [facility]. Reviewed these records and see that patient was sent to cardiac rehab.   Medications were adjusted which included stopping metoprolol and starting carvedilol and stopping nifedipine and starting chlorthalidone. Her CP improved over the next several months, and she completed cardiac rehab. She never followed up with me as directed and shows up after 1.5 yrs today. She has no CP, has been working and overall feels good but the other day felt a bit winded when pushing herself. I reviewed med records, and see most of her issues since she last saw me involved pulling her back. She's getting repeat labs when she leaves my office today. Past Medical History:   Diagnosis Date    Anemia     Hypertension     [facility]ocardial infarction) ([facility] [address]     NSTEMI (non-ST elevated myocardial infarction) ([facility] [address]     12/2018       Past Surgical History:   Procedure Laterality Date    HX APPENDECTOMY      HX HEART CATHETERIZATION         Current Outpatient Medications   Medication Sig Dispense Refill    traZODone (DESYREL) 50 mg tablet TAKE ONE TABLET BY MOUTH EVERY NIGHT 90 Tab 1    atorvastatin (LIPITOR) 40 mg tablet TAKE ONE TABLET BY MOUTH EVERY NIGHT AT BEDTIME 90 Tab 1    lidocaine (Lidoderm) 5 % Apply patch to the affected area for 12 hours a day and remove for 12 hours a day. 15 Each 0    cyclobenzaprine (FLEXERIL) 5 mg tablet Take 2 Tabs by mouth three (3) times daily (with meals). 9 Tab 0    albuterol (PROVENTIL HFA, VENTOLIN HFA, PROAIR HFA) 90 mcg/actuation inhaler Take 2 Puffs by inhalation every four (4) hours as needed for Wheezing. 1 Inhaler 0    lisinopril (PRINIVIL, ZESTRIL) 20 mg tablet Take 1 Tab by mouth daily. (Patient taking differently: Take 10 mg by mouth daily.) 90 Tab 0    carvedilol (COREG) 12.5 mg tablet Take 12.5 mg by mouth two (2) times a day.  aspirin 81 mg chewable tablet Take 1 Tab by mouth daily. 90 Tab 3    nitroglycerin (NITROSTAT) 0.4 mg SL tablet 1 Tab by SubLINGual route every five (5) minutes as needed for Chest Pain. Up to 3 doses.  1 Bottle 1    Cholecalciferol, Vitamin D3, 1,000 unit cap Take 1,000 Units by mouth daily. Allergies   Allergen Reactions    Norvasc [Amlodipine] Swelling     Ankle swelling        Social History     Socioeconomic History    Marital status:      Spouse name: Not on file    Number of children: Not on file    Years of education: Not on file    Highest education level: Not on file   Occupational History    Not on file   Social Needs    Financial resource strain: Not on file    Food insecurity     Worry: Not on file     Inability: Not on file    Transportation needs     Medical: Not on file     Non-medical: Not on file   Tobacco Use    Smoking status: Never Smoker    Smokeless tobacco: Never Used   Substance and Sexual Activity    Alcohol use: No     Alcohol/week: 0.0 standard drinks     Frequency: Never    Drug use: No    Sexual activity: Yes     Partners: Male     Birth control/protection: Surgical     Comment: tubal ligation   Lifestyle    Physical activity     Days per week: Not on file     Minutes per session: Not on file    Stress: Not on file   Relationships    Social connections     Talks on phone: Not on file     Gets together: Not on file     Attends Synagogue service: Not on file     Active member of club or organization: Not on file     Attends meetings of clubs or organizations: Not on file     Relationship status: Not on file    Intimate partner violence     Fear of current or ex partner: Not on file     Emotionally abused: Not on file     Physically abused: Not on file     Forced sexual activity: Not on file   Other Topics Concern    Not on file   Social History Narrative    Not on file    Home care    No significant FH of premature CAD or SCD. Review of Systems    14 pt Review of Systems is negative unless otherwise mentioned in the HPI.     Wt Readings from Last 3 Encounters:   09/25/20 73.9 kg (163 lb)   09/14/20 73.7 kg (162 lb 6.4 oz)   07/04/20 74.4 kg (164 lb)     Temp Readings from Last 3 Encounters:   09/14/20 98.8 °F (37.1 °C) (Oral)   07/04/20 98.1 °F (36.7 °C)   04/11/20 98.3 °F (36.8 °C)     BP Readings from Last 3 Encounters:   09/25/20 112/72   09/14/20 110/70   07/04/20 130/59     Pulse Readings from Last 3 Encounters:   09/25/20 (!) 55   09/14/20 62   07/04/20 (!) 58       12/11/18   ECHO ADULT FOLLOW-UP OR LIMITED 12/12/2018 12/12/2018    Narrative · Estimated left ventricular ejection fraction is 56 - 60%. Visually   measured ejection fraction. Left ventricular mild concentric hypertrophy. Normal left ventricular wall motion, no regional wall motion abnormality   noted. Signed by: Ambreen Jeff MD       Physical Exam:    Visit Vitals  /72 (BP 1 Location: Left arm, BP Patient Position: Sitting)   Pulse (!) 55   Ht 5' 1\" (1.549 m)   Wt 73.9 kg (163 lb)   LMP 08/28/2017   SpO2 96%   BMI 30.80 kg/m²      Physical Exam  Constitutional:       General: She is not in acute distress. Appearance: She is well-developed. She is not diaphoretic. HENT:      Head: Normocephalic and atraumatic. Eyes:      Pupils: Pupils are equal, round, and reactive to light. Neck:      Vascular: No JVD. Cardiovascular:      Rate and Rhythm: Normal rate and regular rhythm. Heart sounds: Normal heart sounds. No murmur. No friction rub. No gallop. Pulmonary:      Effort: Pulmonary effort is normal. No respiratory distress. Breath sounds: Normal breath sounds. No wheezing or rales. Chest:      Chest wall: No tenderness. Abdominal:      General: Bowel sounds are normal.      Palpations: Abdomen is soft. Musculoskeletal:         General: No tenderness. Skin:     General: Skin is warm and dry. Neurological:      Mental Status: She is alert and oriented to person, place, and time.          Last EKG: NSR, normal axis and intervals, no ST segment abnormalities    Lab Results   Component Value Date/Time    Cholesterol, total 131 06/05/2019 08:26 AM    HDL Cholesterol 42 06/05/2019 08:26 AM    LDL, calculated 68.8 06/05/2019 08:26 AM    VLDL, calculated 20.2 06/05/2019 08:26 AM    Triglyceride 101 06/05/2019 08:26 AM    CHOL/HDL Ratio 3.1 06/05/2019 08:26 AM     Impression and Plan:  Ariel Durbin is a 63 y.o. with:    1.) S/p NSTEMI, likely due to nonatherosclerotic MI- SCAD +/- vasospastic angina (OM2) 2018  2.) Fibromuscular dysplasia noted iliac on cath  3.) HTN, avg SBP goal <140s, (not at goal but did not take any am meds yet)  4.) Dyslipidemia  5.) Normal LV function    1.) Continue BB to control angina, while nitrate responsive- not tolerating nitrates due to H/a  2.) Has not taken meds yet today and SBP is 112- she wants to try to come off some meds- will stop thiazide today and asked her to keep an eye on her BP at work  3.) Stop Plavix today, has completed well over a year of DAPT  4.) RTC 1-2 months BP check with NP  5.) RTC with me 6 months    Encouraged daily routine regular exercise program    Thank you for allowing me to participate in the care of your patient, please do not hesitate to call with questions or concerns.         Brandan Willoughby, DO

## 2020-09-28 NOTE — PROGRESS NOTES
Improvement in lipid panel. A1C in prediabetic range./ still low H & H. Continue f/u with hematology. Send lab results to hematology.

## 2020-09-30 NOTE — PROGRESS NOTES
Contacted patient and verified identity using name and date of birth (2- identifiers)  Spoke with patient and she verbalized understanding of Improvement in lipid panel. A1C in prediabetic range./ still low H & H. Continue f/u with hematology. Lab results to hematology [facility]).

## 2020-11-13 RX ORDER — CLOPIDOGREL BISULFATE 75 MG/1
TABLET ORAL
Qty: 30 TAB | Refills: 2 | Status: SHIPPED | OUTPATIENT
Start: 2020-11-13 | End: 2021-02-10

## 2021-01-12 ENCOUNTER — TRANSCRIBE ORDER (OUTPATIENT)
Dept: SCHEDULING | Age: 56
End: 2021-01-12

## 2021-01-12 DIAGNOSIS — Z12.31 VISIT FOR SCREENING MAMMOGRAM: Primary | ICD-10-CM

## 2021-01-25 ENCOUNTER — OFFICE VISIT (OUTPATIENT)
Dept: FAMILY MEDICINE CLINIC | Age: 56
End: 2021-01-25
Payer: OTHER GOVERNMENT

## 2021-01-25 VITALS
HEIGHT: 61 IN | DIASTOLIC BLOOD PRESSURE: 76 MMHG | WEIGHT: 161 LBS | HEART RATE: 66 BPM | SYSTOLIC BLOOD PRESSURE: 100 MMHG | BODY MASS INDEX: 30.4 KG/M2 | RESPIRATION RATE: 16 BRPM | OXYGEN SATURATION: 98 % | TEMPERATURE: 98 F

## 2021-01-25 DIAGNOSIS — I21.4 NSTEMI (NON-ST ELEVATED MYOCARDIAL INFARCTION) (HCC): Primary | ICD-10-CM

## 2021-01-25 DIAGNOSIS — E55.9 VITAMIN D DEFICIENCY: ICD-10-CM

## 2021-01-25 DIAGNOSIS — R73.03 PREDIABETES: ICD-10-CM

## 2021-01-25 DIAGNOSIS — R06.00 DYSPNEA, UNSPECIFIED TYPE: ICD-10-CM

## 2021-01-25 DIAGNOSIS — I10 ESSENTIAL HYPERTENSION: ICD-10-CM

## 2021-01-25 PROCEDURE — 99213 OFFICE O/P EST LOW 20 MIN: CPT | Performed by: FAMILY MEDICINE

## 2021-01-25 RX ORDER — CHLORTHALIDONE 25 MG/1
TABLET ORAL
COMMUNITY
Start: 2020-11-12 | End: 2021-02-10

## 2021-01-25 NOTE — PROGRESS NOTES
Chief Complaint   Patient presents with    Coronary Artery Disease    Hypertension    Anemia       1. Have you been to the ER, urgent care clinic since your last visit? Hospitalized since your last visit? No    2. Have you seen or consulted any other health care providers outside of the [address] [place] [address] since your last visit? Include any pap smears or colon screening.  No

## 2021-01-25 NOTE — PATIENT INSTRUCTIONS
Low Sodium Diet (2,000 Milligram): Care Instructions Your Care Instructions Too much sodium causes your body to hold on to extra water. This can raise your blood pressure and force your heart and kidneys to work harder. In very serious cases, this could cause you to be put in the hospital. It might even be life-threatening. By limiting sodium, you will feel better and lower your risk of serious problems. The most common source of sodium is salt. People get most of the salt in their diet from canned, prepared, and packaged foods. Fast food and restaurant meals also are very high in sodium. Your doctor will probably limit your sodium to less than 2,000 milligrams (mg) a day. This limit counts all the sodium in prepared and packaged foods and any salt you add to your food. Follow-up care is a key part of your treatment and safety. Be sure to make and go to all appointments, and call your doctor if you are having problems. It's also a good idea to know your test results and keep a list of the medicines you take. How can you care for yourself at home? Read food labels · Read labels on cans and food packages. The labels tell you how much sodium is in each serving. Make sure that you look at the serving size. If you eat more than the serving size, you have eaten more sodium. · Food labels also tell you the Percent Daily Value for sodium. Choose products with low Percent Daily Values for sodium. · Be aware that sodium can come in forms other than salt, including monosodium glutamate (MSG), sodium citrate, and sodium bicarbonate (baking soda). MSG is often added to Asian food. When you eat out, you can sometimes ask for food without MSG or added salt. Buy low-sodium foods · Buy foods that are labeled \"unsalted\" (no salt added), \"sodium-free\" (less than 5 mg of sodium per serving), or \"low-sodium\" (less than 140 mg of sodium per serving). Foods labeled \"reduced-sodium\" and \"light sodium\" may still have too much sodium. Be sure to read the label to see how much sodium you are getting. · Buy fresh vegetables, or frozen vegetables without added sauces. Buy low-sodium versions of canned vegetables, soups, and other canned goods. Prepare low-sodium meals · Cut back on the amount of salt you use in cooking. This will help you adjust to the taste. Do not add salt after cooking. One teaspoon of salt has about 2,300 mg of sodium. · Take the salt shaker off the table. · Flavor your food with garlic, lemon juice, onion, vinegar, herbs, and spices. Do not use soy sauce, lite soy sauce, steak sauce, onion salt, garlic salt, celery salt, mustard, or ketchup on your food. · Use low-sodium salad dressings, sauces, and ketchup. Or make your own salad dressings and sauces without adding salt. · Use less salt (or none) when recipes call for it. You can often use half the salt a recipe calls for without losing flavor. Other foods such as rice, pasta, and grains do not need added salt. · Rinse canned vegetables, and cook them in fresh water. This removes somebut not allof the salt. · Avoid water that is naturally high in sodium or that has been treated with water softeners, which add sodium. Call your local water company to find out the sodium content of your water supply. If you buy bottled water, read the label and choose a sodium-free brand. Avoid high-sodium foods · Avoid eating: 
? Smoked, cured, salted, and canned meat, fish, and poultry. ? Ham, ravi, hot dogs, and luncheon meats. ? Regular, hard, and processed cheese and regular peanut butter. ? Crackers with salted tops, and other salted snack foods such as pretzels, chips, and salted popcorn. ? Frozen prepared meals, unless labeled low-sodium. ? Canned and dried soups, broths, and bouillon, unless labeled sodium-free or low-sodium. ? Canned vegetables, unless labeled sodium-free or low-sodium. ? [place] [employer] fries, pizza, tacos, and other fast foods. ? Pickles, olives, ketchup, and other condiments, especially soy sauce, unless labeled sodium-free or low-sodium. Where can you learn more? Go to http://www.gray.com/ Enter F867 in the search box to learn more about \"Low Sodium Diet (2,000 Milligram): Care Instructions. \" Current as of: August 22, 2019               Content Version: 12.6 © 1780-0764 [employer]. Care instructions adapted under license by [employer] (which disclaims liability or warranty for this information). If you have questions about a medical condition or this instruction, always ask your healthcare professional. [place] [address] any warranty or liability for your use of this information. Learning About Coronary Artery Disease (CAD) What is coronary artery disease? Coronary artery disease (CAD) occurs when plaque builds up in the arteries that bring oxygen-rich blood to your heart. Plaque is a fatty substance made of cholesterol, calcium, and other substances in the blood. This process is called hardening of the arteries, or atherosclerosis. What happens when you have coronary artery disease? · Plaque may narrow the coronary arteries. Narrowed arteries cause poor blood flow. This can lead to angina symptoms such as chest pain or discomfort. If blood flow is completely blocked, you could have a heart attack. · You can slow CAD and reduce the risk of future problems by making changes in your lifestyle. These include quitting smoking and eating heart-healthy foods. · Treatments for CAD, along with changes in your lifestyle, can help you live a longer and healthier life. How can you prevent coronary artery disease? · Do not smoke. It may be the best thing you can do to prevent heart disease. If you need help quitting, talk to your doctor about stop-smoking programs and medicines. These can increase your chances of quitting for good. · Be active. Get at least 30 minutes of exercise on most days of the week. Walking is a good choice. You also may want to do other activities, such as running, swimming, cycling, or playing tennis or team sports. · Eat heart-healthy foods. Eat more fruits and vegetables and less foods that contain saturated and trans fats. Limit alcohol, sodium, and sweets. · Stay at a healthy weight. Lose weight if you need to. · Manage other health problems such as diabetes, high blood pressure, and high cholesterol. How is coronary artery disease treated? · Your doctor will suggest that you make lifestyle changes. For example, your doctor may ask you to eat healthy foods, quit smoking, lose extra weight, and be more active. · You will have to take medicines. · Your doctor may suggest a procedure to open narrowed or blocked arteries. This is called angioplasty. Or your doctor may suggest using healthy blood vessels to create detours around narrowed or blocked arteries. This is called bypass surgery. Follow-up care is a key part of your treatment and safety. Be sure to make and go to all appointments, and call your doctor if you are having problems. It's also a good idea to know your test results and keep a list of the medicines you take. Where can you learn more? Go to http://kings-junito.info/ Enter [phone] in the search box to learn more about \"Learning About Coronary Artery Disease (CAD). \" Current as of: December 16, 2019               Content Version: 12.6 © 4643-2106 [employer]. Care instructions adapted under license by [employer] (which disclaims liability or warranty for this information). If you have questions about a medical condition or this instruction, always ask your healthcare professional. [address] any warranty or liability for your use of this information.

## 2021-01-25 NOTE — PROGRESS NOTES
HISTORY OF PRESENT ILLNESS  Viktoriya Kruger is a 63 y.o. female. HPI: Here for follow-up  Recent non-ST elevation MI. Seen cardiology. On risk factor management. She been doing okay but last week one time she felt like when she was in the basement doing laundry and coming up she felt short of breath. No recurrent episode. No associated chest pain or any diaphoresis or palpitation. No nausea or vomiting. No headache or dizziness. She has not missed any single dose of her Plavix or aspirin. On statin as well. No signs of volume overload. No cold cough wheezing or any shortness of breath. She was sitting comfortable and did not appear in any acute distress. I have advised her to make a follow-up appointment with the cardiology to see if any further work-up has been needed at this time. She understood and will call their office. During visit vitals been stable  Visit Vitals  /76 (BP 1 Location: Left arm, BP Patient Position: Sitting)   Pulse 66   Temp 98 °F (36.7 °C) (Oral)   Resp 16   Ht 5' 1\" (1.549 m)   Wt 161 lb (73 kg)   SpO2 98%   BMI 30.42 kg/m²     Review medication list, vitals, problem list,allergies. Lab Results   Component Value Date/Time    WBC 6.4 09/25/2020 09:59 AM    HGB 10.5 (L) 09/25/2020 09:59 AM    HCT 33.0 (L) 09/25/2020 09:59 AM    PLATELET 876 16/52/9581 09:59 AM    MCV 88.7 09/25/2020 09:59 AM     Lab Results   Component Value Date/Time    Sodium 141 09/25/2020 09:59 AM    Potassium 4.3 09/25/2020 09:59 AM    Chloride 107 09/25/2020 09:59 AM    CO2 31 09/25/2020 09:59 AM    Anion gap 3 09/25/2020 09:59 AM    Glucose 89 09/25/2020 09:59 AM    BUN 17 09/25/2020 09:59 AM    Creatinine 1.01 09/25/2020 09:59 AM    BUN/Creatinine ratio 17 09/25/2020 09:59 AM    GFR est AA >60 09/25/2020 09:59 AM    GFR est non-AA 57 (L) 09/25/2020 09:59 AM    Calcium 9.4 09/25/2020 09:59 AM    Bilirubin, total 0.5 09/25/2020 09:59 AM    Alk.  phosphatase 104 09/25/2020 09:59 AM    Protein, total 7.2 09/25/2020 09:59 AM    Albumin 4.1 09/25/2020 09:59 AM    Globulin 3.1 09/25/2020 09:59 AM    A-G Ratio 1.3 09/25/2020 09:59 AM    ALT (SGPT) 24 09/25/2020 09:59 AM    AST (SGOT) 16 09/25/2020 09:59 AM     Lab Results   Component Value Date/Time    Cholesterol, total 138 09/25/2020 09:59 AM    HDL Cholesterol 46 09/25/2020 09:59 AM    LDL, calculated 69.6 09/25/2020 09:59 AM    VLDL, calculated 22.4 09/25/2020 09:59 AM    Triglyceride 112 09/25/2020 09:59 AM    CHOL/HDL Ratio 3.0 09/25/2020 09:59 AM     Lab Results   Component Value Date/Time    TSH 1.040 06/09/2014 12:00 AM     Lab Results   Component Value Date/Time    Hemoglobin A1c 5.9 (H) 09/25/2020 09:59 AM     Lab Results   Component Value Date/Time    Vitamin D 25-Hydroxy 40.6 05/24/2017 09:25 AM             ROS    Physical Exam    ASSESSMENT and PLAN    ICD-10-CM ICD-9-CM    1. NSTEMI (non-ST elevated myocardial infarction) [facility]): On risk factor management. One episode of short of breath on exertion but no recurrent episode. No associated chest pain or diaphoresis or palpitation. Compliant with aspirin and Plavix. Statin. Will advise her to make a follow-up appointment with cardiology I21.4 410.70    2. Prediabetes: Diet modification and lifestyle modification R73.03 790.29    3. Dyspnea, unspecified type: See above R06.00 786.09    4. Vitamin D deficiency: Taking supplement E55.9 268.9    5. Essential hypertension :well controlled. Continue current dose of medication and low salt diet. Exercise as tolerated. I10 401.9    Patient understood and agreed with the plan  Review health maintenance  Follow-up and Dispositions    · Return in about 3 months (around 4/25/2021).

## 2021-02-06 DIAGNOSIS — G47.9 SLEEP TROUBLE: ICD-10-CM

## 2021-02-08 RX ORDER — TRAZODONE HYDROCHLORIDE 50 MG/1
TABLET ORAL
Qty: 90 TAB | Refills: 1 | Status: SHIPPED | OUTPATIENT
Start: 2021-02-08 | End: 2021-02-16 | Stop reason: SDUPTHER

## 2021-02-10 RX ORDER — CLOPIDOGREL BISULFATE 75 MG/1
TABLET ORAL
Qty: 90 TAB | Refills: 1 | Status: SHIPPED | OUTPATIENT
Start: 2021-02-10 | End: 2021-04-25

## 2021-02-16 DIAGNOSIS — G47.9 SLEEP TROUBLE: ICD-10-CM

## 2021-02-16 DIAGNOSIS — I10 ESSENTIAL HYPERTENSION: ICD-10-CM

## 2021-02-16 RX ORDER — LISINOPRIL 10 MG/1
10 TABLET ORAL DAILY
Qty: 90 TAB | Refills: 1 | Status: SHIPPED | OUTPATIENT
Start: 2021-02-16 | End: 2021-08-13

## 2021-02-16 RX ORDER — TRAZODONE HYDROCHLORIDE 50 MG/1
TABLET ORAL
Qty: 90 TAB | Refills: 1 | Status: SHIPPED | OUTPATIENT
Start: 2021-02-16 | End: 2021-04-25

## 2021-02-16 RX ORDER — CARVEDILOL 12.5 MG/1
12.5 TABLET ORAL 2 TIMES DAILY
Qty: 180 TAB | Refills: 0 | Status: SHIPPED | OUTPATIENT
Start: 2021-02-16 | End: 2021-03-31 | Stop reason: ALTCHOICE

## 2021-02-16 NOTE — TELEPHONE ENCOUNTER
This patient contacted office for the following prescriptions to be filled:    Medication requested :   Requested Prescriptions     Pending Prescriptions Disp Refills    lisinopriL (PRINIVIL, ZESTRIL) 20 mg tablet 90 Tab 0     Sig: Take 1 Tab by mouth daily.      PCP: Ivy Alvarez or Print: Domenic  Mail order or Local pharmacy [address]     Scheduled appointment if not seen by current providers in office: LOV 1/25/2021 f/u 4/26/2021

## 2021-02-16 NOTE — TELEPHONE ENCOUNTER
This patient contacted office for the following prescriptions to be filled:    Last office visit: 1/25/2021  Follow up appointment: 4/26/2021  Medication requested :   Requested Prescriptions     Pending Prescriptions Disp Refills    lisinopriL (PRINIVIL, ZESTRIL) 20 mg tablet 90 Tab 0     Sig: Take 1 Tab by mouth daily.  traZODone (DESYREL) 50 mg tablet 90 Tab 1     Sig: TAKE 1 TABLET BY MOUTH EVERY NIGHT    carvediloL (COREG) 12.5 mg tablet 180 Tab 0     Sig: Take 1 Tab by mouth two (2) times a day. [facility]  Mail order or Local pharmacy name [facility] [address] never received the rx for Trazodone, pt would like to have it filled at [facility] instead.

## 2021-03-16 ENCOUNTER — HOSPITAL ENCOUNTER (OUTPATIENT)
Dept: MAMMOGRAPHY | Age: 56
Discharge: HOME | End: 2021-03-16
Attending: FAMILY MEDICINE
Payer: OTHER GOVERNMENT

## 2021-03-16 DIAGNOSIS — Z12.31 VISIT FOR SCREENING MAMMOGRAM: ICD-10-CM

## 2021-03-16 PROCEDURE — 77063 BREAST TOMOSYNTHESIS BI: CPT

## 2021-03-31 ENCOUNTER — OFFICE VISIT (OUTPATIENT)
Dept: CARDIOLOGY CLINIC | Age: 56
End: 2021-03-31
Payer: OTHER GOVERNMENT

## 2021-03-31 VITALS
SYSTOLIC BLOOD PRESSURE: 122 MMHG | HEART RATE: 67 BPM | HEIGHT: 61 IN | BODY MASS INDEX: 30.58 KG/M2 | WEIGHT: 162 LBS | OXYGEN SATURATION: 98 % | DIASTOLIC BLOOD PRESSURE: 74 MMHG

## 2021-03-31 DIAGNOSIS — I21.4 NSTEMI (NON-ST ELEVATED MYOCARDIAL INFARCTION) (HCC): Primary | ICD-10-CM

## 2021-03-31 DIAGNOSIS — I10 ESSENTIAL HYPERTENSION: ICD-10-CM

## 2021-03-31 DIAGNOSIS — I20.8 MICROVASCULAR ANGINA (HCC): ICD-10-CM

## 2021-03-31 PROCEDURE — 99215 OFFICE O/P EST HI 40 MIN: CPT | Performed by: INTERNAL MEDICINE

## 2021-03-31 RX ORDER — CARVEDILOL 25 MG/1
25 TABLET ORAL 2 TIMES DAILY WITH MEALS
Qty: 60 TAB | Refills: 6 | Status: SHIPPED | OUTPATIENT
Start: 2021-03-31 | End: 2021-04-25

## 2021-03-31 NOTE — PATIENT INSTRUCTIONS
Follow up with Dr. Louis Gardiner in 6 months or sooner if needed Increase Carvedilol to 25mg twice a day Stop Plavix Call our office in a couple of weeks if continued chest pain

## 2021-03-31 NOTE — PROGRESS NOTES
Gerber Rosado presents today for   Chief Complaint   Patient presents with    Follow-up     6 month follow up     Chest Pain     intermittent, middle of chest, \"pain\"       Gerber Rosado preferred language for health care discussion is english/other. Is someone accompanying this pt? no    Is the patient using any DME equipment during [address]? no    Depression Screening:  3 most recent PHQ Screens 1/25/2021   Little interest or pleasure in doing things Not at all   Feeling down, depressed, irritable, or hopeless Not at all   Total Score PHQ 2 0       Learning Assessment:  Learning Assessment 9/14/2020   PRIMARY LEARNER Patient   HIGHEST LEVEL OF EDUCATION - PRIMARY LEARNER  DID NOT GRADUATE [facility] PRIMARY LEARNER NONE   CO-LEARNER CAREGIVER No   PRIMARY LANGUAGE ENGLISH    NEED No   LEARNER PREFERENCE PRIMARY LISTENING     VIDEOS   LEARNING SPECIAL TOPICS No   ANSWERED BY patient   RELATIONSHIP SELF       Abuse Screening:  Abuse Screening Questionnaire 1/25/2021   Do you ever feel afraid of your partner? N   Are you in a relationship with someone who physically or mentally threatens you? N   Is it safe for you to go home? Y       Fall Risk  Fall Risk Assessment, last 12 mths 9/25/2020   Able to walk? Yes   Fall in past 12 months? No       Pt currently taking Anticoagulant therapy? ASA 81mg every day and Plavix     Coordination of Care:  1. Have you been to the ER, urgent care clinic since your last visit? Hospitalized since your last visit? no    2. Have you seen or consulted any other health care providers outside of the [address] [place] [address] since your last visit? Include any pap smears or colon screening.  no

## 2021-03-31 NOTE — PROGRESS NOTES
Laura Delarosa    f/u NSTEMI/ nonatherosclerotic MI  C/o CP x 4 months    HPI    aLura Delarosa is a 63 y.o.  perimenopausal woman with HTN, presented to 2018 ER c/o URI symptoms including CP- ended up NSTEMI, initial cath called \"normal coronaries. \" Due to continued pain, another cath few days later revealed an occluded mid/ distal OM2. Also FMD of right iliac artery noted. Her EF remained normal without any WMA. I reviewed her hospital records in detail, and have elaborated the findings:    · NSTEMI with troponin up to 7.66, initial ECG without new ischemic changes, presented with CP. Recent NSTEMI with peak troponin 13.4 with nonobstructive CAD by cath as noted below. · Nonobstructive CAD by cath (LM normal, LAD minimal luminal irregularities, left Cx normal, RCA normal) by Dr. Eduardo Barron 2018. Possible transient thrombus vs vasospasm. · Hypertension with elevated BP on admission. · Upper respiratory infection, recent cough and congestion with low grade fever 99.5 without leukocytosis. · Normal LV function 56-60% by echo 2018 without WMA 2018. · Mild to moderate tricuspid valve regurgitation with mild pulmonary hypertension PASP 43mmHg by echo 2018. She was discharged on medical therapy (included statin, BB, CCB, plavix). She was tried on Imdur but it gave her headaches. Since the hospitalization, she is significantly better but says almost every night she could feel some chest pressure. It goes away as soon as she takes her night medications. Otherwise, no LE swelling, no SOB. She has just started walking again but hasnt returned to work. She works in home care. She went for a second opinion at [facility]. Reviewed these records and see that patient was sent to cardiac rehab. Medications were adjusted which included stopping metoprolol and starting carvedilol and stopping nifedipine and starting chlorthalidone.   Her CP improved over the next several months, and she completed cardiac rehab. She never followed up with me as directed and shows up after 1.5 yrs. I asked her to stop her Plavix (and she didn't) but otherwise she was doing well and without CP at that time. She comes today, now saying for 4 months has been having CP with exertion. She was working 5-6 days a week, and with pushing/ moving patients it was too much. She has cut down to 2 days a week, and has noticed significant improvement in the CP. It goes away with rest and says doesn't last long enough to take a nitro. Past Medical History:   Diagnosis Date    Anemia     Hypertension     [facility]     NSTEMI (non-ST elevated myocardial infarction) ([facility]     12/2018       Past Surgical History:   Procedure Laterality Date    HX APPENDECTOMY      HX HEART CATHETERIZATION         Current Outpatient Medications   Medication Sig Dispense Refill    lisinopriL (PRINIVIL, ZESTRIL) 10 mg tablet Take 1 Tab by mouth daily. 90 Tab 1    traZODone (DESYREL) 50 mg tablet TAKE 1 TABLET BY MOUTH EVERY NIGHT 90 Tab 1    carvediloL (COREG) 12.5 mg tablet Take 1 Tab by mouth two (2) times a day. 180 Tab 0    chlorthalidone (HYGROTON) 25 mg tablet TAKE ONE TABLET BY MOUTH DAILY 90 Tab 1    clopidogreL (PLAVIX) 75 mg tab TAKE ONE TABLET BY MOUTH DAILY 90 Tab 1    atorvastatin (LIPITOR) 40 mg tablet TAKE ONE TABLET BY MOUTH EVERY NIGHT AT BEDTIME 90 Tab 1    albuterol (PROVENTIL HFA, VENTOLIN HFA, PROAIR HFA) 90 mcg/actuation inhaler Take 2 Puffs by inhalation every four (4) hours as needed for Wheezing. 1 Inhaler 0    aspirin 81 mg chewable tablet Take 1 Tab by mouth daily. 90 Tab 3    nitroglycerin (NITROSTAT) 0.4 mg SL tablet 1 Tab by SubLINGual route every five (5) minutes as needed for Chest Pain. Up to 3 doses. 1 Bottle 1    Cholecalciferol, Vitamin D3, 1,000 unit cap Take 1,000 Units by mouth daily.          Allergies   Allergen Reactions    Norvasc [Amlodipine] Swelling     Ankle swelling        Social History     Socioeconomic History    Marital status:      Spouse name: Not on file    Number of children: Not on file    Years of education: Not on file    Highest education level: Not on file   Occupational History    Not on file   Social Needs    Financial resource strain: Not on file    Food insecurity     Worry: Not on file     Inability: Not on file    Transportation needs     Medical: Not on file     Non-medical: Not on file   Tobacco Use    Smoking status: Never Smoker    Smokeless tobacco: Never Used   Substance and Sexual Activity    Alcohol use: No     Alcohol/week: 0.0 standard drinks     Frequency: Never    Drug use: No    Sexual activity: Yes     Partners: Male     Birth control/protection: Surgical     Comment: tubal ligation   Lifestyle    Physical activity     Days per week: Not on file     Minutes per session: Not on file    Stress: Not on file   Relationships    Social connections     Talks on phone: Not on file     Gets together: Not on file     Attends Hindu service: Not on file     Active member of club or organization: Not on file     Attends meetings of clubs or organizations: Not on file     Relationship status: Not on file    Intimate partner violence     Fear of current or ex partner: Not on file     Emotionally abused: Not on file     Physically abused: Not on file     Forced sexual activity: Not on file   Other Topics Concern    Not on file   Social History Narrative    Not on file    Home care    No significant FH of premature CAD or SCD. Review of Systems    14 pt Review of Systems is negative unless otherwise mentioned in the HPI.     Wt Readings from Last 3 Encounters:   03/31/21 73.5 kg (162 lb)   01/25/21 73 kg (161 lb)   09/25/20 73.9 kg (163 lb)     Temp Readings from Last 3 Encounters:   01/25/21 98 °F (36.7 °C) (Oral)   09/14/20 98.8 °F (37.1 °C) (Oral)   07/04/20 98.1 °F (36.7 °C)     BP Readings from Last 3 Encounters: 03/31/21 122/74   01/25/21 100/76   09/25/20 112/72     Pulse Readings from Last 3 Encounters:   03/31/21 67   01/25/21 66   09/25/20 (!) 55       12/11/18   ECHO ADULT FOLLOW-UP OR LIMITED 12/12/2018 12/12/2018    Narrative · Estimated left ventricular ejection fraction is 56 - 60%. Visually   measured ejection fraction. Left ventricular mild concentric hypertrophy. Normal left ventricular wall motion, no regional wall motion abnormality   noted. Signed by: Kamryn Penny MD       Physical Exam:    Visit Vitals  /74 (BP 1 Location: Left upper arm, BP Patient Position: Sitting, BP Cuff Size: Adult)   Pulse 67   Ht 5' 1\" (1.549 m)   Wt 73.5 kg (162 lb)   LMP 08/28/2017   SpO2 98%   BMI 30.61 kg/m²      Physical Exam  Constitutional:       General: She is not in acute distress. Appearance: She is well-developed. She is not diaphoretic. HENT:      Head: Normocephalic and atraumatic. Eyes:      Pupils: Pupils are equal, round, and reactive to light. Neck:      Vascular: No JVD. Cardiovascular:      Rate and Rhythm: Normal rate and regular rhythm. Heart sounds: Normal heart sounds. No murmur. No friction rub. No gallop. Pulmonary:      Effort: Pulmonary effort is normal. No respiratory distress. Breath sounds: Normal breath sounds. No wheezing or rales. Chest:      Chest wall: No tenderness. Abdominal:      General: Bowel sounds are normal.      Palpations: Abdomen is soft. Musculoskeletal:         General: No tenderness. Skin:     General: Skin is warm and dry. Neurological:      Mental Status: She is alert and oriented to person, place, and time.          Last EKG: NSR, normal axis and intervals, no ST segment abnormalities    Lab Results   Component Value Date/Time    Cholesterol, total 138 09/25/2020 09:59 AM    HDL Cholesterol 46 09/25/2020 09:59 AM    LDL, calculated 69.6 09/25/2020 09:59 AM    VLDL, calculated 22.4 09/25/2020 09:59 AM    Triglyceride 112 09/25/2020 09:59 AM    CHOL/HDL Ratio 3.0 09/25/2020 09:59 AM     Impression and Plan:  Kolby Wright is a 63 y.o. with:    1.) S/p NSTEMI, likely due to nonatherosclerotic MI- SCAD +/- vasospastic angina (OM2) 2018  2.) Fibromuscular dysplasia noted iliac on cath  3.) HTN, avg SBP goal <140s, (not at goal but did not take any am meds yet)  4.) Dyslipidemia  5.) Normal LV function    1.) Increase BB to control angina, while nitrate responsive- not tolerating nitrates due to H/a, Coreg was increased today to 25 mg BID  2.) Doesn't seem like she needs all her BP meds, I asked her to stop the thiazide last time (as well as her Plavix) and for some reason she did not follow recommendations, will reeval next time  3.) RTC 6 months, call sooner if needed    Encouraged daily routine regular exercise program, start slowly  If BB not working to control CP, please call and we'll try Ranexa which can be helpful with microvascular angina    >>60 mins spent explaining importance of above    Thank you for allowing me to participate in the care of your patient, please do not hesitate to call with questions or concerns. Follow-up and Dispositions    · Return in about 6 months (around 9/30/2021).      [address],    Ivin Peabody, DO

## 2021-04-20 ENCOUNTER — HOSPITAL ENCOUNTER (EMERGENCY)
Age: 56
Discharge: HOME | End: 2021-04-20
Attending: EMERGENCY MEDICINE
Payer: OTHER GOVERNMENT

## 2021-04-20 VITALS
WEIGHT: 162 LBS | DIASTOLIC BLOOD PRESSURE: 70 MMHG | OXYGEN SATURATION: 97 % | BODY MASS INDEX: 30.58 KG/M2 | SYSTOLIC BLOOD PRESSURE: 123 MMHG | RESPIRATION RATE: 18 BRPM | TEMPERATURE: 99 F | HEIGHT: 61 IN | HEART RATE: 83 BPM

## 2021-04-20 DIAGNOSIS — Z20.822 ENCOUNTER FOR LABORATORY TESTING FOR COVID-19 VIRUS: ICD-10-CM

## 2021-04-20 DIAGNOSIS — J01.90 ACUTE SINUSITIS, RECURRENCE NOT SPECIFIED, UNSPECIFIED LOCATION: Primary | ICD-10-CM

## 2021-04-20 LAB — SARS-COV-2, COV2: NORMAL

## 2021-04-20 PROCEDURE — U0003 INFECTIOUS AGENT DETECTION BY NUCLEIC ACID (DNA OR RNA); SEVERE ACUTE RESPIRATORY SYNDROME CORONAVIRUS 2 (SARS-COV-2) (CORONAVIRUS DISEASE [COVID-19]), AMPLIFIED PROBE TECHNIQUE, MAKING USE OF HIGH THROUGHPUT TECHNOLOGIES AS DESCRIBED BY CMS-2020-01-R: HCPCS

## 2021-04-20 PROCEDURE — 99282 EMERGENCY DEPT VISIT SF MDM: CPT

## 2021-04-20 RX ORDER — CEFDINIR 300 MG/1
300 CAPSULE ORAL 2 TIMES DAILY
Qty: 20 CAP | Refills: 0 | Status: SHIPPED | OUTPATIENT
Start: 2021-04-20 | End: 2021-04-30

## 2021-04-20 RX ORDER — CETIRIZINE HYDROCHLORIDE 10 MG/1
10 CAPSULE, LIQUID FILLED ORAL DAILY
Qty: 20 CAP | Refills: 0 | Status: SHIPPED | OUTPATIENT
Start: 2021-04-20 | End: 2021-05-11

## 2021-04-20 NOTE — ED TRIAGE NOTES
Pt arrives with 2days of cough with nasal congestion and green mucous, with hx of sinus issues and seasonal allergies. Pt denies sob, fever or covid exposure.

## 2021-04-20 NOTE — ED NOTES
I have reviewed discharge instructions with the patient. The patient verbalized understanding. Current Discharge Medication List      START taking these medications    Details   cefdinir (OMNICEF) 300 mg capsule Take 1 Cap by mouth two (2) times a day for 10 days. Qty: 20 Cap, Refills: 0      Cetirizine (ZyrTEC) 10 mg cap Take 10 mg by mouth daily for 20 doses.   Qty: 20 Cap, Refills: 0

## 2021-04-20 NOTE — ED PROVIDER NOTES
[facility]  EMERGENCY DEPARTMENT HISTORY AND PHYSICAL EXAM       Date: 4/20/2021   Patient Name: Abdirizak Velazquez   YOB: 1965  Medical Record Number: [id number]    HISTORY OF PRESENTING ILLNESS:     Abdirizak Velazquez is a 63 y.o. female presenting with the noted PMH to the ED c/o nasal congestion and cough. Patient states it started a week ago with nasal congestion. She said she thought it was due to allergies with the pollen outside. But then she states about a week ago she was around someone who was coughing a lot. She states that the member ended up testing negative for Covid but she was still concerned. She denies any chest pain. Denies any difficulty breathing. Denies any fevers or chills. Denies any nausea vomiting. Denies any abdominal pain. Denies any urine or bowel changes. She does report blowing thick nasal discharge from her nose. Denies any sore throat. Denies any trauma or travel. Rest of complete systems reviewed and negative    Primary Care Provider: Sagar Duran MD   Specialist:    Past Medical History:   Past Medical History:   Diagnosis Date    Anemia     Hypertension     [facility]     NSTEMI (non-ST elevated myocardial infarction) ([facility]     12/2018        Past Surgical History:   Past Surgical History:   Procedure Laterality Date    HX APPENDECTOMY      HX HEART CATHETERIZATION          Social History:   Social History     Tobacco Use    Smoking status: Never Smoker    Smokeless tobacco: Never Used   Substance Use Topics    Alcohol use: No     Alcohol/week: 0.0 standard drinks     Frequency: Never    Drug use: No        Allergies:    Allergies   Allergen Reactions    Norvasc [Amlodipine] Swelling     Ankle swelling         REVIEW OF SYSTEMS:  Review of Systems      PHYSICAL EXAM:  Vitals:    04/20/21 1043   BP: 123/70   Pulse: 83   Resp: 18   Temp: 99 °F (37.2 °C)   SpO2: 97%   Weight: 73.5 kg (162 lb)   Height: 5' 1\" (1.549 m)       Physical Exam   Vital signs reviewed. Alert oriented x 3 in NAD. HEENT: normocephalic atraumatic. Eyes are PERRLA EOMI. Conjunctiva normal.    External ears and nose normal.  Oropharynx clear. Midline uvula. No tongue elevation. Clear nasal discharge. Boggy nasal mucous membranes. Neck: normal external exam. No midline neck or back TTP. No lymphadenopathy. Lungs are clear to ascultation bilaterally. normal effort  Heart is regular rate and rhythm with no murmurs. Abdomen soft and nontender. No rebound rigidity or guarding. Extremities: Moves all 4 extremities and no distress. Full range of motion. 2+ pulses and BCR in all 4 extremities. Neuro: Normal gait. 5 out of 5 strength in all 4 extremities. No facial droop. Skin examination: intact. no rashes. No petechia or purpura. Medications - No data to display    RESULTS:    Labs -   Labs Reviewed   SARS-COV-2   NOVEL CORONAVIRUS (COVID-19)       Radiologic Studies -  No results found. MEDICAL DECISION MAKING    Lungs are clear. No signs of pneumonia or pneumothorax. No fevers or sepsis. Patient's oxygenation is 97% and normal on room air. Not requiring any oxygen supplementation. Covid swab obtained. Patient aware will be sent to the lab. Takes 2 to 3 days for to return. Aware to check \"my chart\" to get the results. Results and precautions explained. Patient states understanding and agrees with plan. Patient has no new complaints, changes, or physical findings. Results were reviewed with the patient. Pt's questions and concerns were addressed. Care plan was outlined, including follow-up with PCP/specialist and return precautions were discussed. Patient is felt to be stable for discharge at this time. Diagnosis   Clinical Impression:   1. Acute sinusitis, recurrence not specified, unspecified location    2.  Encounter for laboratory testing for COVID-19 virus           Follow-up Information     Follow up With Specialties Details Why Contact Info    Ivan Otoole MD [facility] Medicine Call today  [address] [place] [address]  [phone] [address] [facility] EMERGENCY DEPT Emergency Medicine Go in 2 days If symptoms worsen, As needed [address] [facility]  [phone]          Current Discharge Medication List      START taking these medications    Details   cefdinir (OMNICEF) 300 mg capsule Take 1 Cap by mouth two (2) times a day for 10 days. Qty: 20 Cap, Refills: 0      Cetirizine (ZyrTEC) 10 mg cap Take 10 mg by mouth daily for 20 doses. Qty: 20 Cap, Refills: 0             Discharged in stable and improved condition. This chart was completed using Dragon, a dictation transcription service. Errors may have resulted from using this device.

## 2021-04-21 ENCOUNTER — PATIENT OUTREACH (OUTPATIENT)
Dept: CASE MANAGEMENT | Age: 56
End: 2021-04-21

## 2021-04-21 LAB — SARS-COV-2, COV2NT: NOT DETECTED

## 2021-04-21 NOTE — PROGRESS NOTES
Date/Time:  4/21/2021 9:51 AM   Call within 2 business days of discharge: Yes   Attempted to reach patient by telephone. Unable to leave(mailbox full) HIPPA compliant message requesting a return call. Will attempt to reach patient again. Covid test pending.

## 2021-04-22 ENCOUNTER — PATIENT OUTREACH (OUTPATIENT)
Dept: CASE MANAGEMENT | Age: 56
End: 2021-04-22

## 2021-04-22 NOTE — PROGRESS NOTES
Date/Time:  4/22/2021 10:32 AM   Call within 2 business days of discharge: Yes   2nd attempt to reach patient by telephone. Unable to leave HIPPA compliant message requesting a return call. Covid test negative. This episode is resolved.

## 2021-04-25 ENCOUNTER — HOSPITAL ENCOUNTER (EMERGENCY)
Age: 56
Discharge: HOME | End: 2021-04-25
Attending: EMERGENCY MEDICINE
Payer: OTHER GOVERNMENT

## 2021-04-25 ENCOUNTER — APPOINTMENT (OUTPATIENT)
Dept: GENERAL RADIOLOGY | Age: 56
End: 2021-04-25
Attending: EMERGENCY MEDICINE
Payer: OTHER GOVERNMENT

## 2021-04-25 VITALS
OXYGEN SATURATION: 100 % | WEIGHT: 162 LBS | TEMPERATURE: 99.2 F | RESPIRATION RATE: 18 BRPM | SYSTOLIC BLOOD PRESSURE: 119 MMHG | BODY MASS INDEX: 30.58 KG/M2 | DIASTOLIC BLOOD PRESSURE: 68 MMHG | HEIGHT: 61 IN | HEART RATE: 71 BPM

## 2021-04-25 DIAGNOSIS — R05.9 COUGH: Primary | ICD-10-CM

## 2021-04-25 PROCEDURE — 71046 X-RAY EXAM CHEST 2 VIEWS: CPT

## 2021-04-25 PROCEDURE — 74011250637 HC RX REV CODE- 250/637: Performed by: EMERGENCY MEDICINE

## 2021-04-25 PROCEDURE — 99283 EMERGENCY DEPT VISIT LOW MDM: CPT

## 2021-04-25 RX ORDER — HYDROCODONE POLISTIREX AND CHLORPHENIRAMINE POLISTIREX 10; 8 MG/5ML; MG/5ML
5 SUSPENSION, EXTENDED RELEASE ORAL
Status: DISCONTINUED | OUTPATIENT
Start: 2021-04-25 | End: 2021-04-25 | Stop reason: HOSPADM

## 2021-04-25 RX ORDER — ATORVASTATIN CALCIUM 40 MG/1
TABLET, FILM COATED ORAL
COMMUNITY
Start: 2020-08-07 | End: 2021-08-06

## 2021-04-25 RX ORDER — HYDROCODONE POLISTIREX AND CHLORPHENIRAMINE POLISTIREX 10; 8 MG/5ML; MG/5ML
5 SUSPENSION, EXTENDED RELEASE ORAL
Qty: 60 ML | Refills: 0 | Status: SHIPPED | OUTPATIENT
Start: 2021-04-25 | End: 2021-04-28

## 2021-04-25 RX ORDER — CLOPIDOGREL BISULFATE 75 MG/1
TABLET ORAL
COMMUNITY
Start: 2020-07-15 | End: 2021-08-19

## 2021-04-25 RX ORDER — CHLORTHALIDONE 25 MG/1
TABLET ORAL
COMMUNITY
Start: 2020-05-17 | End: 2021-08-17 | Stop reason: SDUPTHER

## 2021-04-25 RX ORDER — TRAZODONE HYDROCHLORIDE 50 MG/1
TABLET ORAL
COMMUNITY
Start: 2020-05-12 | End: 2021-08-17 | Stop reason: SDUPTHER

## 2021-04-25 RX ORDER — CARVEDILOL 12.5 MG/1
TABLET ORAL
COMMUNITY
Start: 2021-02-16 | End: 2021-05-17

## 2021-04-25 RX ADMIN — HYDROCODONE POLISTIREX AND CHLORPHENIRAMINE POLISTIREX 5 ML: 10; 8 SUSPENSION, EXTENDED RELEASE ORAL at 08:26

## 2021-04-25 NOTE — ED PROVIDER NOTES
EMERGENCY DEPARTMENT HISTORY AND PHYSICAL EXAM    8:07 AM      Date: 4/25/2021  Patient Name: Obi Root    History of Presenting Illness     Chief Complaint   Patient presents with    Cough         History Provided By: Patient    Additional History (Context): Obi Root is a 63 y.o. female with hypertension and myocardial infarction who presents with cough for 5 days. Patient was seen in the emergency room and placed on antibiotics. Patient has been taking it. Patient continued to cough. She returns to see if she can get some cough medicine. PCP: Ade Giron MD        Current Facility-Administered Medications   Medication Dose Route Frequency Provider Last Rate Last Admin    HYDROcodone-chlorpheniramine (TUSSIONEX) oral suspension 5 mL  5 mL Oral Q12H PRN Steffen, Rosalina Hinton, DO   5 mL at 04/25/21 0806     Current Outpatient Medications   Medication Sig Dispense Refill    atorvastatin (LIPITOR) 40 mg tablet       carvediloL (COREG) 12.5 mg tablet       chlorthalidone (HYGROTON) 25 mg tablet       clopidogreL (PLAVIX) 75 mg tab       traZODone (DESYREL) 50 mg tablet       HYDROcodone-chlorpheniramine (Tussionex Pennkinetic ER) 10-8 mg/5 mL suspension Take 5 mL by mouth every twelve (12) hours as needed for Cough for up to 3 days. Max Daily Amount: 10 mL. 60 mL 0    cefdinir (OMNICEF) 300 mg capsule Take 1 Cap by mouth two (2) times a day for 10 days. 20 Cap 0    Cetirizine (ZyrTEC) 10 mg cap Take 10 mg by mouth daily for 20 doses. 20 Cap 0    lisinopriL (PRINIVIL, ZESTRIL) 10 mg tablet Take 1 Tab by mouth daily. 90 Tab 1    albuterol (PROVENTIL HFA, VENTOLIN HFA, PROAIR HFA) 90 mcg/actuation inhaler Take 2 Puffs by inhalation every four (4) hours as needed for Wheezing. 1 Inhaler 0    aspirin 81 mg chewable tablet Take 1 Tab by mouth daily. 90 Tab 3    nitroglycerin (NITROSTAT) 0.4 mg SL tablet 1 Tab by SubLINGual route every five (5) minutes as needed for Chest Pain. Up to 3 doses.  1 Bottle 1    Cholecalciferol, Vitamin D3, 1,000 unit cap Take 1,000 Units by mouth daily. Past History     Past Medical History:  Past Medical History:   Diagnosis Date    Anemia     Hypertension     [facility]     NSTEMI (non-ST elevated myocardial infarction) ([facility]     12/2018       Past Surgical History:  Past Surgical History:   Procedure Laterality Date    HX APPENDECTOMY      HX HEART CATHETERIZATION         Family History:  Family History   Problem Relation Age of Onset    Breast Cancer Mother     Hypertension Mother     Diabetes Mother     Heart Failure Mother     Breast Cancer Sister     Breast Cancer Sister        Social History:  Social History     Tobacco Use    Smoking status: Never Smoker    Smokeless tobacco: Never Used   Substance Use Topics    Alcohol use: No     Alcohol/week: 0.0 standard drinks     Frequency: Never    Drug use: No       Allergies: Allergies   Allergen Reactions    Norvasc [Amlodipine] Swelling     Ankle swelling          Review of Systems       Review of Systems   Constitutional: Negative. Negative for chills, diaphoresis and fever. HENT: Negative. Negative for congestion, rhinorrhea and sore throat. Eyes: Negative. Negative for pain, discharge and redness. Respiratory: Negative. Negative for cough, chest tightness, shortness of breath and wheezing. Cardiovascular: Negative. Negative for chest pain. Gastrointestinal: Negative. Negative for abdominal pain, constipation, diarrhea, nausea and vomiting. Genitourinary: Negative. Negative for dysuria, flank pain, frequency, hematuria and urgency. Musculoskeletal: Negative. Negative for back pain and neck pain. Skin: Negative. Negative for rash. Neurological: Negative. Negative for syncope, weakness, numbness and headaches. Psychiatric/Behavioral: Negative. All other systems reviewed and are negative.         Physical Exam     Visit Vitals  /68 (BP 1 Location: Left upper arm, BP Patient Position: At rest)   Pulse 71   Temp 99.2 °F (37.3 °C)   Resp 18   Ht 5' 1\" (1.549 m)   Wt 73.5 kg (162 lb)   LMP 08/28/2017   SpO2 100%   BMI 30.61 kg/m²         Physical Exam  Vitals signs and nursing note reviewed. Constitutional:       General: She is not in acute distress. Appearance: Normal appearance. She is well-developed. She is not ill-appearing, toxic-appearing or diaphoretic. HENT:      Head: Normocephalic and atraumatic. Mouth/Throat:      Pharynx: No oropharyngeal exudate. Eyes:      General: No scleral icterus. Conjunctiva/sclera: Conjunctivae normal.      Pupils: Pupils are equal, round, and reactive to light. Neck:      Musculoskeletal: Normal range of motion and neck supple. Thyroid: No thyromegaly. Vascular: No hepatojugular reflux or JVD. Trachea: No tracheal deviation. Cardiovascular:      Rate and Rhythm: Normal rate and regular rhythm. Pulses: Normal pulses. Radial pulses are 2+ on the right side and 2+ on the left side. Dorsalis pedis pulses are 2+ on the right side and 2+ on the left side. Heart sounds: Normal heart sounds, S1 normal and S2 normal. No murmur. No gallop. No S3 or S4 sounds. Pulmonary:      Effort: Pulmonary effort is normal. No respiratory distress. Breath sounds: Normal breath sounds. No decreased breath sounds, wheezing, rhonchi or rales. Abdominal:      General: Bowel sounds are normal. There is no distension. Palpations: Abdomen is soft. Abdomen is not rigid. There is no mass. Tenderness: There is no abdominal tenderness. There is no guarding or rebound. Negative signs include Emery's sign and McBurney's sign. Musculoskeletal: Normal range of motion. Lymphadenopathy:      Head:      Right side of head: No submental, submandibular, preauricular or occipital adenopathy.       Left side of head: No submental, submandibular, preauricular or occipital adenopathy. Cervical: No cervical adenopathy. Upper Body:      Right upper body: No supraclavicular adenopathy. Left upper body: No supraclavicular adenopathy. Skin:     General: Skin is warm and dry. Findings: No rash. Neurological:      Mental Status: She is alert. She is not disoriented. GCS: GCS eye subscore is 4. GCS verbal subscore is 5. GCS motor subscore is 6. Cranial Nerves: No cranial nerve deficit. Sensory: No sensory deficit. Coordination: Coordination normal.      Gait: Gait normal.      Deep Tendon Reflexes: Reflexes are normal and symmetric. Psychiatric:         Speech: Speech normal.         Behavior: Behavior normal.         Thought Content: Thought content normal.         Judgment: Judgment normal.           Diagnostic Study Results     Labs -  No results found for this or any previous visit (from the past 12 hour(s)). Radiologic Studies -   XR CHEST PA LAT    (Results Pending)         Medical Decision Making   Provider Notes (Medical Decision Making):  MDM  Number of Diagnoses or Management Options  Diagnosis management comments: Pneumonia         I am the first provider for this patient. I reviewed the vital signs, available nursing notes, past medical history, past surgical history, family history and social history. Vital Signs-Reviewed the patient's vital signs. Records Reviewed: Nursing Notes (Time of Review: 8:07 AM)    ED Course: Progress Notes, Reevaluation, and Consults:    Chest X-Ray showed No acute process. 8:37 AM 4/25/2021        Diagnosis       I have reassessed the patient. Patient is feeling well. Patient will be prescribed Tussionex. Patient was discharged in stable condition. Patient is to return to emergency department if any new or worsening condition. Clinical Impression:   1.  Cough        Disposition: Discharged home     Follow-up Information     Follow up With Specialties Details Why Contact Info    Joleen Donis Ingrid Cooks, MD Family Medicine In 2 days  [facility] [address]  [phone]               Attestation        Provider Attestation:     I personally performed the services described in the documentation, reviewed the documentation and it accurately and completely records my words and actions utilizing the [address] April 25, 2021 at 8:35 AM - Moncion, Jame Mcburney, DO    Disclaimer. It is dictated using utilizing voice recognition software. Unfortunately this leads to occasional typographical errors. I apologize in advance if the situation occurs. If questions arise please do not hesitate to contact me or call our department.

## 2021-04-25 NOTE — ED TRIAGE NOTES
Pt c/o a cough that has been ongoing for the past week. Was seen here and prescribed abx but is still coughing.

## 2021-05-11 ENCOUNTER — OFFICE VISIT (OUTPATIENT)
Dept: FAMILY MEDICINE CLINIC | Age: 56
End: 2021-05-11
Payer: OTHER GOVERNMENT

## 2021-05-11 VITALS
OXYGEN SATURATION: 98 % | WEIGHT: 158.2 LBS | BODY MASS INDEX: 29.87 KG/M2 | HEIGHT: 61 IN | TEMPERATURE: 97.3 F | SYSTOLIC BLOOD PRESSURE: 104 MMHG | HEART RATE: 63 BPM | DIASTOLIC BLOOD PRESSURE: 72 MMHG | RESPIRATION RATE: 16 BRPM

## 2021-05-11 DIAGNOSIS — R06.2 WHEEZING: ICD-10-CM

## 2021-05-11 DIAGNOSIS — R09.81 SINUS CONGESTION: Primary | ICD-10-CM

## 2021-05-11 DIAGNOSIS — R06.00 DYSPNEA, UNSPECIFIED TYPE: ICD-10-CM

## 2021-05-11 DIAGNOSIS — R05.9 COUGH: ICD-10-CM

## 2021-05-11 PROCEDURE — 99213 OFFICE O/P EST LOW 20 MIN: CPT | Performed by: FAMILY MEDICINE

## 2021-05-11 RX ORDER — AZITHROMYCIN 250 MG/1
TABLET, FILM COATED ORAL
Qty: 6 TAB | Refills: 0 | Status: SHIPPED | OUTPATIENT
Start: 2021-05-11 | End: 2021-05-16

## 2021-05-11 RX ORDER — PREDNISONE 5 MG/1
TABLET ORAL
Qty: 21 TAB | Refills: 0 | Status: SHIPPED | OUTPATIENT
Start: 2021-05-11 | End: 2021-12-27 | Stop reason: ALTCHOICE

## 2021-05-11 RX ORDER — ALBUTEROL SULFATE 90 UG/1
2 AEROSOL, METERED RESPIRATORY (INHALATION)
Qty: 1 INHALER | Refills: 0 | Status: SHIPPED | OUTPATIENT
Start: 2021-05-11 | End: 2021-12-27 | Stop reason: SDUPTHER

## 2021-05-11 NOTE — PROGRESS NOTES
1. Have you been to the ER, urgent care clinic since your last visit? Hospitalized since your last visit? [facility] 4/25/21 and 4/20/21    2. Have you seen or consulted any other health care providers outside of the [address] [place] [address] since your last visit? Include any pap smears or colon screening.  No    Chief Complaint   Patient presents with    Hypertension    Blood sugar problem    Other     NSTEMI    Cough

## 2021-05-11 NOTE — PATIENT INSTRUCTIONS
Bronchitis: Care Instructions Your Care Instructions Bronchitis is inflammation of the bronchial tubes, which carry air to the lungs. The tubes swell and produce mucus, or phlegm. The mucus and inflamed bronchial tubes make you cough. You may have trouble breathing. Most cases of bronchitis are caused by viruses like those that cause colds. Antibiotics usually do not help and they may be harmful. Bronchitis usually develops rapidly and lasts about 2 to 3 weeks in otherwise healthy people. Follow-up care is a key part of your treatment and safety. Be sure to make and go to all appointments, and call your doctor if you are having problems. It's also a good idea to know your test results and keep a list of the medicines you take. How can you care for yourself at home? · Take all medicines exactly as prescribed. Call your doctor if you think you are having a problem with your medicine. · Get some extra rest. 
· Take an over-the-counter pain medicine, such as acetaminophen (Tylenol), ibuprofen (Advil, Motrin), or naproxen (Aleve) to reduce fever and relieve body aches. Read and follow all instructions on the label. · Do not take two or more pain medicines at the same time unless the doctor told you to. Many pain medicines have acetaminophen, which is Tylenol. Too much acetaminophen (Tylenol) can be harmful. · Take an over-the-counter cough medicine that contains dextromethorphan to help quiet a dry, hacking cough so that you can sleep. Avoid cough medicines that have more than one active ingredient. Read and follow all instructions on the label. · Breathe moist air from a humidifier, hot shower, or sink filled with hot water. The heat and moisture will thin mucus so you can cough it out. · Do not smoke. Smoking can make bronchitis worse. If you need help quitting, talk to your doctor about stop-smoking programs and medicines. These can increase your chances of quitting for good.  
When should you call for help? Call 911 anytime you think you may need emergency care. For example, call if: 
  · You have severe trouble breathing. Call your doctor now or seek immediate medical care if: 
  · You have new or worse trouble breathing.  
  · You cough up dark brown or bloody mucus (sputum).  
  · You have a new or higher fever.  
  · You have a new rash. Watch closely for changes in your health, and be sure to contact your doctor if: 
  · You cough more deeply or more often, especially if you notice more mucus or a change in the color of your mucus.  
  · You are not getting better as expected. Where can you learn more? Go to http://www.gray.com/ Enter H333 in the search box to learn more about \"Bronchitis: Care Instructions. \" Current as of: October 26, 2020               Content Version: 12.8 © 1265-6075 [employer]. Care instructions adapted under license by [employer] (which disclaims liability or warranty for this information). If you have questions about a medical condition or this instruction, always ask your healthcare professional. [address] any warranty or liability for your use of this information.

## 2021-05-11 NOTE — PROGRESS NOTES
HISTORY OF PRESENT ILLNESS  Zahida Sullivan is a 63 y.o. female. HPI: Here for follow-up after emergency room visit. She had a ER visit twice. Review ER visits through the chart  She had a sinus infection and was given Omnicef. She was feeling much better. Still has a cough. Was given Mucinex. Not helping much. Has on and off wheezing. No shortness of breath. Cough with yellow sputum. Chest congestion on and off. No chest pain. No nausea vomiting or abdominal pain. No appetite or weight changes. No urinary or bowel complaint. She has completed antibiotic 2 weeks ago. During visit today did not appear in any acute distress. Able to talk in full sentence. No audible wheezing. Not using any extra respiratory muscle. Does not smoke. No history of asthma or COPD  Visit Vitals  /72 (BP 1 Location: Right upper arm, BP Patient Position: Sitting, BP Cuff Size: Adult)   Pulse 63   Temp 97.3 °F (36.3 °C) (Temporal)   Resp 16   Ht 5' 1\" (1.549 m)   Wt 158 lb 3.2 oz (71.8 kg)   SpO2 98%   BMI 29.89 kg/m²     Review medication list, vitals, problem list,allergies. ROS: See HPI    Physical Exam  Cardiovascular:      Rate and Rhythm: Normal rate. Pulmonary:      Effort: Pulmonary effort is normal. No respiratory distress. Breath sounds: No wheezing. Abdominal:      Tenderness: There is no abdominal tenderness. Musculoskeletal:         General: No swelling. Lymphadenopathy:      Cervical: No cervical adenopathy. Skin:     Coloration: Skin is not pale. Neurological:      Mental Status: She is alert and oriented to person, place, and time. Psychiatric:         Behavior: Behavior normal.         ASSESSMENT and PLAN    ICD-10-CM ICD-9-CM    1. Sinus congestion: Was given Omnicef few weeks ago from the ER. He still has a some congestion but much improved than before. Has cough with yellow sputum. Wheezing on and off. Also has shortness of breath on exertion.   No history of asthma or COPD.  At this time I will give her prednisone and azithromycin. If no improvement we will consider further work-up. During the ER visit she had negative chest x-ray and negative Covid test.  Currently has no fever. We will follow-up next week. Advised her to go to emergency room if worsening of symptoms. R09.81 478. 19 predniSONE (STERAPRED) 5 mg dose pack   2. Cough  R05 786.2 albuterol (PROVENTIL HFA, VENTOLIN HFA, PROAIR HFA) 90 mcg/actuation inhaler      predniSONE (STERAPRED) 5 mg dose pack      azithromycin (ZITHROMAX) 250 mg tablet   3. Wheezing  R06.2 786.07 albuterol (PROVENTIL HFA, VENTOLIN HFA, PROAIR HFA) 90 mcg/actuation inhaler      predniSONE (STERAPRED) 5 mg dose pack   4. Dyspnea, unspecified type  R06.00 786.09 azithromycin (ZITHROMAX) 250 mg tablet   Patient understood agreed with the plan  Follow-up and Dispositions    · Return in about 1 week (around 5/18/2021). Please note that this dictation was completed with [employer], the [employer] voice recognition software. Quite often unanticipated grammatical, syntax, homophones, and other interpretive errors are inadvertently transcribed by the computer software. Please disregard these errors. Please excuse any errors that have escaped final proofreading.

## 2021-05-17 RX ORDER — CARVEDILOL 12.5 MG/1
TABLET ORAL
Qty: 180 TAB | Refills: 0 | Status: SHIPPED | OUTPATIENT
Start: 2021-05-17 | End: 2021-08-13

## 2021-08-13 DIAGNOSIS — I10 ESSENTIAL HYPERTENSION: ICD-10-CM

## 2021-08-13 RX ORDER — LISINOPRIL 10 MG/1
TABLET ORAL
Qty: 90 TABLET | Refills: 1 | Status: SHIPPED | OUTPATIENT
Start: 2021-08-13 | End: 2021-11-04 | Stop reason: SDUPTHER

## 2021-08-17 RX ORDER — TRAZODONE HYDROCHLORIDE 50 MG/1
TABLET ORAL
Qty: 90 TABLET | Refills: 0 | Status: SHIPPED | OUTPATIENT
Start: 2021-08-17 | End: 2021-11-15

## 2021-08-17 RX ORDER — CHLORTHALIDONE 25 MG/1
TABLET ORAL
Qty: 90 TABLET | Refills: 0 | Status: SHIPPED | OUTPATIENT
Start: 2021-08-17 | End: 2021-11-04 | Stop reason: SDUPTHER

## 2021-08-19 RX ORDER — CLOPIDOGREL BISULFATE 75 MG/1
TABLET ORAL
Qty: 90 TABLET | Refills: 3 | Status: SHIPPED | OUTPATIENT
Start: 2021-08-19 | End: 2021-11-04 | Stop reason: ALTCHOICE

## 2021-08-26 ENCOUNTER — OFFICE VISIT (OUTPATIENT)
Dept: FAMILY MEDICINE CLINIC | Age: 56
End: 2021-08-26
Payer: OTHER GOVERNMENT

## 2021-08-26 ENCOUNTER — HOSPITAL ENCOUNTER (OUTPATIENT)
Dept: LAB | Age: 56
Discharge: HOME | End: 2021-08-26
Payer: OTHER GOVERNMENT

## 2021-08-26 VITALS
HEART RATE: 67 BPM | TEMPERATURE: 97.1 F | DIASTOLIC BLOOD PRESSURE: 78 MMHG | HEIGHT: 61 IN | WEIGHT: 160.6 LBS | SYSTOLIC BLOOD PRESSURE: 116 MMHG | OXYGEN SATURATION: 98 % | RESPIRATION RATE: 16 BRPM | BODY MASS INDEX: 30.32 KG/M2

## 2021-08-26 DIAGNOSIS — D53.9 DEFICIENCY ANEMIA: ICD-10-CM

## 2021-08-26 DIAGNOSIS — Z01.419 WELL WOMAN EXAM WITH ROUTINE GYNECOLOGICAL EXAM: Primary | ICD-10-CM

## 2021-08-26 DIAGNOSIS — E78.00 PURE HYPERCHOLESTEROLEMIA: ICD-10-CM

## 2021-08-26 DIAGNOSIS — R73.01 IMPAIRED FASTING BLOOD SUGAR: ICD-10-CM

## 2021-08-26 DIAGNOSIS — I25.10 CORONARY ARTERY DISEASE INVOLVING NATIVE CORONARY ARTERY OF NATIVE HEART WITHOUT ANGINA PECTORIS: ICD-10-CM

## 2021-08-26 DIAGNOSIS — Z80.3 FAMILY HISTORY OF BREAST CANCER: ICD-10-CM

## 2021-08-26 DIAGNOSIS — Z12.4 SCREENING FOR MALIGNANT NEOPLASM OF CERVIX: ICD-10-CM

## 2021-08-26 PROCEDURE — 99214 OFFICE O/P EST MOD 30 MIN: CPT | Performed by: FAMILY MEDICINE

## 2021-08-26 PROCEDURE — 88175 CYTOPATH C/V AUTO FLUID REDO: CPT

## 2021-08-26 PROCEDURE — 99396 PREV VISIT EST AGE 40-64: CPT | Performed by: FAMILY MEDICINE

## 2021-08-26 PROCEDURE — 87624 HPV HI-RISK TYP POOLED RSLT: CPT

## 2021-08-26 NOTE — PROGRESS NOTES
Subjective:   61 y.o. female for Well Woman Check. Patient's last menstrual period was 08/28/2017. No pelvic pain or vaginal discharge. Not sexually active. No prior history of STD. No abnormal Pap smear in the past.  Not much compliant with self breast exam.  Prior history of abnormal mammogram but showed as a calcification. Recent mammogram in March negative for malignancy. Mother and sister had breast cancer. No family history of cervical, ovarian or colon cancer. History of anemia. Following the hematology. Reviewed their consultation. Saw them back in June. No new recommendations. Observe for now. Denies any unusual fatigue. Coronary artery disease. On risk factor management. Hyperlipidemia. On statin. No side effects. Prediabetes. Working on lifestyle modification and diet modification. Discussed high BMI as well. Trying to be active/walking  Reviewed labs. Lab Results   Component Value Date/Time    WBC 6.4 09/25/2020 09:59 AM    HGB 10.5 (L) 09/25/2020 09:59 AM    HCT 33.0 (L) 09/25/2020 09:59 AM    PLATELET 016 75/01/6925 09:59 AM    MCV 88.7 09/25/2020 09:59 AM     Lab Results   Component Value Date/Time    Sodium 141 09/25/2020 09:59 AM    Potassium 4.3 09/25/2020 09:59 AM    Chloride 107 09/25/2020 09:59 AM    CO2 31 09/25/2020 09:59 AM    Anion gap 3 09/25/2020 09:59 AM    Glucose 89 09/25/2020 09:59 AM    BUN 17 09/25/2020 09:59 AM    Creatinine 1.01 09/25/2020 09:59 AM    BUN/Creatinine ratio 17 09/25/2020 09:59 AM    GFR est AA >60 09/25/2020 09:59 AM    GFR est non-AA 57 (L) 09/25/2020 09:59 AM    Calcium 9.4 09/25/2020 09:59 AM    Bilirubin, total 0.5 09/25/2020 09:59 AM    Alk.  phosphatase 104 09/25/2020 09:59 AM    Protein, total 7.2 09/25/2020 09:59 AM    Albumin 4.1 09/25/2020 09:59 AM    Globulin 3.1 09/25/2020 09:59 AM    A-G Ratio 1.3 09/25/2020 09:59 AM    ALT (SGPT) 24 09/25/2020 09:59 AM    AST (SGOT) 16 09/25/2020 09:59 AM     Lab Results   Component Value Date/Time    Cholesterol, total 138 09/25/2020 09:59 AM    HDL Cholesterol 46 09/25/2020 09:59 AM    LDL, calculated 69.6 09/25/2020 09:59 AM    VLDL, calculated 22.4 09/25/2020 09:59 AM    Triglyceride 112 09/25/2020 09:59 AM    CHOL/HDL Ratio 3.0 09/25/2020 09:59 AM     Lab Results   Component Value Date/Time    TSH 1.040 06/09/2014 12:00 AM     Lab Results   Component Value Date/Time    Hemoglobin A1c 5.9 (H) 09/25/2020 09:59 AM     Lab Results   Component Value Date/Time    Vitamin D 25-Hydroxy 40.6 05/24/2017 09:25 AM               Patient Active Problem List   Diagnosis Code    Vitamin D deficiency E55.9    Dense breast/ next year 3D mammogram  R92.2    S/P colonoscopy with polypectomy/ repeat in 3 years around 2019 april  Z98.890    Atypical chest pain R07.89    [facility] I21.4    Coronary artery disease involving native coronary artery I25.10    Accelerated hypertension I10    Absolute anemia D64.9    Prediabetes R73.03     Patient Active Problem List    Diagnosis Date Noted    Absolute anemia 06/05/2019    Prediabetes 06/05/2019    NSTEMI (non-ST elevated myocardial infarction) [facility] 12/12/2018    Coronary artery disease involving native coronary artery 12/12/2018    Accelerated hypertension 12/12/2018    Atypical chest pain 12/07/2018    S/P colonoscopy with polypectomy/ repeat in 3 years around 2019 april  04/15/2016    Dense breast/ next year 3D mammogram  07/01/2015    Vitamin D deficiency 05/29/2014     Current Outpatient Medications   Medication Sig Dispense Refill    clopidogreL (PLAVIX) 75 mg tab TAKE ONE TABLET BY MOUTH DAILY 90 Tablet 3    traZODone (DESYREL) 50 mg tablet TAKE ONE TABLET BY MOUTH ONCE NIGHTLY 90 Tablet 0    chlorthalidone (HYGROTON) 25 mg tablet TAKE ONE TABLET BY MOUTH DAILY 90 Tablet 0    carvediloL (COREG) 12.5 mg tablet TAKE ONE TABLET BY MOUTH TWICE A  Tablet 1    lisinopriL (PRINIVIL, ZESTRIL) 10 mg tablet TAKE ONE TABLET BY MOUTH DAILY 90 Tablet 1    atorvastatin (LIPITOR) 40 mg tablet TAKE ONE TABLET BY MOUTH EVERY NIGHT AT BEDTIME 90 Tablet 1    albuterol (PROVENTIL HFA, VENTOLIN HFA, PROAIR HFA) 90 mcg/actuation inhaler Take 2 Puffs by inhalation every four (4) hours as needed for Wheezing. 1 Inhaler 0    aspirin 81 mg chewable tablet Take 1 Tab by mouth daily. 90 Tab 3    nitroglycerin (NITROSTAT) 0.4 mg SL tablet 1 Tab by SubLINGual route every five (5) minutes as needed for Chest Pain. Up to 3 doses. 1 Bottle 1    Cholecalciferol, Vitamin D3, 1,000 unit cap Take 1,000 Units by mouth daily.  predniSONE (STERAPRED) 5 mg dose pack See administration instruction per 5mg dose pack (Patient not taking: Reported on 8/26/2021) 21 Tab 0     Allergies   Allergen Reactions    Norvasc [Amlodipine] Swelling     Ankle swelling      Past Medical History:   Diagnosis Date    Anemia     Hypertension     [facility]     NSTEMI (non-ST elevated myocardial infarction) ([facility]     12/2018     Past Surgical History:   Procedure Laterality Date    HX APPENDECTOMY      HX HEART CATHETERIZATION       Family History   Problem Relation Age of Onset    Breast Cancer Mother     Hypertension Mother     Diabetes Mother     Heart Failure Mother     Breast Cancer Sister     Breast Cancer Sister      Social History     Tobacco Use    Smoking status: Never Smoker    Smokeless tobacco: Never Used   Substance Use Topics    Alcohol use: No     Alcohol/week: 0.0 standard drinks        ROS:  Feeling well. No dyspnea or chest pain on exertion. No abdominal pain, change in bowel habits, black or bloody stools. No urinary tract symptoms. GYN ROS: no breast pain or new or enlarging lumps on self exam. No neurological complaints.     Objective:     Visit Vitals  /78 (BP 1 Location: Right upper arm, BP Patient Position: Sitting, BP Cuff Size: Adult)   Pulse 67   Temp 97.1 °F (36.2 °C) (Temporal) Resp 16   Ht 5' 1\" (1.549 m)   Wt 160 lb 9.6 oz (72.8 kg)   LMP 08/28/2017   SpO2 98%   BMI 30.35 kg/m²     The patient appears well, alert, oriented x 3, in no distress. ENT normal.  Neck supple. No adenopathy or thyromegaly. VALERIO. Lungs are clear, good air entry, no wheezes, rhonchi or rales. S1 and S2 normal, no murmurs, regular rate and rhythm. Abdomen soft without tenderness, guarding, mass or organomegaly. Extremities show no edema, normal peripheral pulses. Neurological is normal, no focal findings. BREAST EXAM: breasts appear normal, no suspicious masses, no skin or nipple changes or axillary nodes    PELVIC EXAM: normal external genitalia, vulva, vagina, cervix, uterus and adnexa    Assessment/Plan:       ICD-10-CM ICD-9-CM    1. Well woman exam with routine gynecological exam  B03.082 L49.24 METABOLIC PANEL, COMPREHENSIVE    [V72.31]   2. Screening for malignant neoplasm of cervix  Z12.4 V76.2 PAP IG, APTIMA HPV AND RFX 16/18,45 ([id number])   3. Impaired fasting blood sugar: Working on lifestyle modification R73.01 790.21 HEMOGLOBIN A1C WITH EAG   4. Deficiency anemia: Following hematology. No new recommendation. Will observe D53.9 281.9     following hematology. seen them yesterday. electrophoresis negative. observe and follow their recommendations. 5. Pure hypercholesterolemia: On statin. No side effects. Recheck labs E78.00 272.0 LIPID PANEL   6. Coronary artery disease involving native coronary artery of native heart without angina pectoris: On risk factor management. Following cardiology and the recommendation I25.10 414.01    7. Family history of breast cancer  Z80.3 V16.3     mother and sisters    Patient understood agreed with the plan  Review health maintenance  Follow-up and Dispositions    · Return in about 4 months (around 12/26/2021). .Please note that this dictation was completed with [employer], the [employer] voice recognition software.   Quite often unanticipated grammatical, syntax, homophones, and other interpretive errors are inadvertently transcribed by the computer software. Please disregard these errors. Please excuse any errors that have escaped final proofreading.

## 2021-08-26 NOTE — PROGRESS NOTES
1. Have you been to the ER, urgent care clinic since your last visit? Hospitalized since your last visit? No    2. Have you seen or consulted any other health care providers outside of the [address] [place] [address] since your last visit? Include any pap smears or colon screening. No    Patient presents for annual pap smear. Last pap 7/10/18. Abnormal Pap smears Yes. Procedures if indicated No.Form of contraception No. Mammogram (if indicated) 3/16/21. Family history of breast CA Mother and two sisters, colon CA No, cervical CA No.Tetanus 3/03/15.

## 2021-08-26 NOTE — PATIENT INSTRUCTIONS
Well Visit, Women 48 to 72: Care Instructions  Overview     Well visits can help you stay healthy. Your doctor has checked your overall health and may have suggested ways to take good care of yourself. Your doctor also may have recommended tests. At home, you can help prevent illness with healthy eating, regular exercise, and other steps. Follow-up care is a key part of your treatment and safety. Be sure to make and go to all appointments, and call your doctor if you are having problems. It's also a good idea to know your test results and keep a list of the medicines you take. How can you care for yourself at home? · Get screening tests that you and your doctor decide on. Screening helps find diseases before any symptoms appear. · Eat healthy foods. Choose fruits, vegetables, whole grains, protein, and low-fat dairy foods. Limit fat, especially saturated fat. Reduce salt in your diet. · Limit alcohol. Have no more than 1 drink a day or 7 drinks a week. · Get at least 30 minutes of exercise on most days of the week. Walking is a good choice. You also may want to do other activities, such as running, swimming, cycling, or playing tennis or team sports. · Reach and stay at a healthy weight. This will lower your risk for many problems, such as obesity, diabetes, heart disease, and high blood pressure. · Do not smoke. Smoking can make health problems worse. If you need help quitting, talk to your doctor about stop-smoking programs and medicines. These can increase your chances of quitting for good. · Care for your mental health. It is easy to get weighed down by worry and stress. Learn strategies to manage stress, like deep breathing and mindfulness, and stay connected with your family and community. If you find you often feel sad or hopeless, talk with your doctor. Treatment can help. · Talk to your doctor about whether you have any risk factors for sexually transmitted infections (STIs).  You can help prevent STIs if you wait to have sex with a new partner (or partners) until you've each been tested for STIs. It also helps if you use condoms (male or female condoms) and if you limit your sex partners to one person who only has sex with you. Vaccines are available for some STIs. · If you think you may have a problem with alcohol or drug use, talk to your doctor. This includes prescription medicines (such as amphetamines and opioids) and illegal drugs (such as cocaine and methamphetamine). Your doctor can help you figure out what type of treatment is best for you. · Protect your skin from too much sun. When you're outdoors from 10 a.m. to 4 p.m., stay in the shade or cover up with clothing and a hat with a wide brim. Wear sunglasses that block UV rays. Even when it's cloudy, put broad-spectrum sunscreen (SPF 30 or higher) on any exposed skin. · See a dentist one or two times a year for checkups and to have your teeth cleaned. · Wear a seat belt in the car. When should you call for help? Watch closely for changes in your health, and be sure to contact your doctor if you have any problems or symptoms that concern you. Where can you learn more? Go to http://www.gray.com/  Enter [id number] in the search box to learn more about \"Well Visit, Women 50 to 72: Care Instructions. \"  Current as of: May 27, 2020               Content Version: 12.8  © 9277-2309 [employer]. Care instructions adapted under license by [employer] (which disclaims liability or warranty for this information). If you have questions about a medical condition or this instruction, always ask your healthcare professional. [place] [address] any warranty or liability for your use of this information. Prediabetes: Care Instructions  Overview     Prediabetes is a warning sign that you're at risk for getting type 2 diabetes.  It means that your blood sugar is higher than it should be. But it's not high enough to be diabetes. The food you eat naturally turns into sugar. Your body uses the sugar for energy. Normally, an organ called the pancreas makes insulin. And insulin allows the sugar in your blood to get into your body's cells. But sometimes the body can't use insulin the right way. So the sugar stays in your blood instead. This is called insulin resistance. The buildup of sugar in your blood means you have prediabetes. The good news is that you may be able to prevent or delay diabetes. Making small lifestyle changes, like getting active and changing your eating habits, may help you get your blood sugar back to normal. You can work with your doctor to make a treatment plan. Follow-up care is a key part of your treatment and safety. Be sure to make and go to all appointments, and call your doctor if you are having problems. It's also a good idea to know your test results and keep a list of the medicines you take. How can you care for yourself at home? · Watch your weight. A healthy weight helps your body use insulin properly. · Limit the amount of calories, sweets, and unhealthy fat you eat. Ask your doctor if you should see a dietitian. A registered dietitian can help you create meal plans that fit your lifestyle. · Get at least 30 minutes of exercise on most days of the week. Exercise helps control your blood sugar. It also helps you maintain a healthy weight. Walking is a good choice. You also may want to do other activities, such as running, swimming, cycling, or playing tennis or team sports. · Do not smoke. Smoking can make prediabetes worse. If you need help quitting, talk to your doctor about stop-smoking programs and medicines. These can increase your chances of quitting for good. · If your doctor prescribed medicines, take them exactly as prescribed. Call your doctor if you think you are having a problem with your medicine.  You will get more details on the specific medicines your doctor prescribes. When should you call for help? Watch closely for changes in your health, and be sure to contact your doctor if:    · You have any symptoms of diabetes. These may include:  ? Being thirsty more often. ? Urinating more. ? Being hungrier. ? Losing weight. ? Being very tired. ? Having blurry vision.     · You have a wound that will not heal.     · You have an infection that will not go away.     · You have problems with your blood pressure.     · You want more information about diabetes and how you can keep from getting it. Where can you learn more? Go to http://www.gray.com/  Enter I222 in the search box to learn more about \"Prediabetes: Care Instructions. \"  Current as of: August 31, 2020               Content Version: 12.8  © 9462-0286 [employer]. Care instructions adapted under license by [employer] (which disclaims liability or warranty for this information). If you have questions about a medical condition or this instruction, always ask your healthcare professional. [place] [address] any warranty or liability for your use of this information. Breast Self-Exam: Care Instructions  Your Care Instructions     A breast self-exam is when you check your breasts for lumps or changes. This regular exam helps you learn how your breasts normally look and feel. Most breast problems or changes are not because of cancer. Breast self-exam is not a substitute for a mammogram. Having regular breast exams by your doctor and regular mammograms improve your chances of finding any problems with your breasts. Some women set a time each month to do a step-by-step breast self-exam. Other women like a less formal system. They might look at their breasts as they brush their teeth, or feel their breasts once in a while in the shower. If you notice a change in your breast, tell your doctor.   Follow-up care is a key part of your treatment and safety. Be sure to make and go to all appointments, and call your doctor if you are having problems. It's also a good idea to know your test results and keep a list of the medicines you take. How do you do a breast self-exam?  · The best time to examine your breasts is usually one week after your menstrual period begins. Your breasts should not be tender then. If you do not have periods, you might do your exam on a day of the month that is easy to remember. · To examine your breasts:  ? Remove all your clothes above the waist and lie down. When you are lying down, your breast tissue spreads evenly over your chest wall, which makes it easier to feel all your breast tissue. ? Use the pads--not the fingertips--of the 3 middle fingers of your left hand to check your right breast. Move your fingers slowly in small coin-sized circles that overlap. ? Use three levels of pressure to feel of all your breast tissue. Use light pressure to feel the tissue close to the skin surface. Use medium pressure to feel a little deeper. Use firm pressure to feel your tissue close to your breastbone and ribs. Use each pressure level to feel your breast tissue before moving on to the next spot. ? Check your entire breast, moving up and down as if following a strip from the collarbone to the bra line, and from the armpit to the ribs. Repeat until you have covered the entire breast.  ? Repeat this procedure for your left breast, using the pads of the 3 middle fingers of your right hand. · To examine your breasts while in the shower:  ? Place one arm over your head and lightly soap your breast on that side. ? Using the pads of your fingers, gently move your hand over your breast (in the strip pattern described above), feeling carefully for any lumps or changes. ? Repeat for the other breast.  · Have your doctor inspect anything you notice to see if you need further testing. Where can you learn more?   Go to http://www.gray.com/  Enter P148 in the search box to learn more about \"Breast Self-Exam: Care Instructions. \"  Current as of: December 17, 2020               Content Version: 12.8  © 2006-2021 [employer]. Care instructions adapted under license by [employer] (which disclaims liability or warranty for this information). If you have questions about a medical condition or this instruction, always ask your healthcare professional. [place] [address] any warranty or liability for your use of this information. Pap Test: Care Instructions  Overview     The Pap test (also called a Pap smear) is a screening test for cancer of the cervix, which is the lower part of the uterus that opens into the vagina. The test can help your doctor find early changes in the cells that could lead to cancer. The sample of cells taken during your test has been sent to a lab so that an expert can look at the cells. It usually takes a week or two to get the results back. Follow-up care is a key part of your treatment and safety. Be sure to make and go to all appointments, and call your doctor if you are having problems. It's also a good idea to know your test results and keep a list of the medicines you take. What do the results mean? · A normal result means that the test did not find any abnormal cells in the sample. · An abnormal result can mean many things. Most of these are not cancer. The results of your test may be abnormal because:  ? You have an infection of the vagina or cervix, such as a yeast infection. ? You have an IUD (intrauterine device for birth control). ? You have low estrogen levels after menopause that are causing the cells to change. ? You have cell changes that may be a sign of precancer or cancer. The results are ranked based on how serious the changes might be. There are many other reasons why you might not get a normal result.  If the results were abnormal, you may need to get another test within a few weeks or months. If the results show changes that could be a sign of cancer, you may need a test called a colposcopy, which provides a more complete view of the cervix. Sometimes the lab cannot use the sample because it does not contain enough cells or was not preserved well. If so, you may need to have the test again. This is not common, but it does happen from time to time. When should you call for help? Watch closely for changes in your health, and be sure to contact your doctor if:    · You have vaginal bleeding or pain for more than 2 days after the test. It is normal to have a small amount of bleeding for a day or two after the test.   Where can you learn more? Go to http://www.gray.com/  Enter L149 in the search box to learn more about \"Pap Test: Care Instructions. \"  Current as of: December 17, 2020               Content Version: 12.8  © 2006-2021 [employer]. Care instructions adapted under license by [employer] (which disclaims liability or warranty for this information). If you have questions about a medical condition or this instruction, always ask your healthcare professional. [address] any warranty or liability for your use of this information.

## 2021-09-17 NOTE — PROGRESS NOTES
Contacted patient and verified identity using name and date of birth (2- identifiers)  Spoke with patient and she verbalized understanding of Negative pap and hpv

## 2021-11-04 ENCOUNTER — OFFICE VISIT (OUTPATIENT)
Dept: CARDIOLOGY CLINIC | Age: 56
End: 2021-11-04
Payer: OTHER GOVERNMENT

## 2021-11-04 VITALS
HEIGHT: 61 IN | HEART RATE: 61 BPM | BODY MASS INDEX: 30.78 KG/M2 | DIASTOLIC BLOOD PRESSURE: 82 MMHG | SYSTOLIC BLOOD PRESSURE: 130 MMHG | WEIGHT: 163 LBS | OXYGEN SATURATION: 99 %

## 2021-11-04 DIAGNOSIS — I21.4 NSTEMI (NON-ST ELEVATED MYOCARDIAL INFARCTION) (HCC): ICD-10-CM

## 2021-11-04 DIAGNOSIS — I25.10 CORONARY ARTERY DISEASE INVOLVING NATIVE CORONARY ARTERY OF NATIVE HEART WITHOUT ANGINA PECTORIS: Primary | ICD-10-CM

## 2021-11-04 DIAGNOSIS — I10 ESSENTIAL HYPERTENSION: ICD-10-CM

## 2021-11-04 PROCEDURE — 93000 ELECTROCARDIOGRAM COMPLETE: CPT | Performed by: INTERNAL MEDICINE

## 2021-11-04 PROCEDURE — 99214 OFFICE O/P EST MOD 30 MIN: CPT | Performed by: INTERNAL MEDICINE

## 2021-11-04 RX ORDER — CHLORTHALIDONE 25 MG/1
25 TABLET ORAL DAILY
Qty: 90 TABLET | Refills: 3 | Status: SHIPPED | OUTPATIENT
Start: 2021-11-04

## 2021-11-04 RX ORDER — CARVEDILOL 12.5 MG/1
12.5 TABLET ORAL 2 TIMES DAILY
Qty: 180 TABLET | Refills: 3 | Status: SHIPPED | OUTPATIENT
Start: 2021-11-04

## 2021-11-04 RX ORDER — ATORVASTATIN CALCIUM 40 MG/1
40 TABLET, FILM COATED ORAL
Qty: 90 TABLET | Refills: 3 | Status: SHIPPED | OUTPATIENT
Start: 2021-11-04

## 2021-11-04 RX ORDER — LISINOPRIL 10 MG/1
10 TABLET ORAL DAILY
Qty: 90 TABLET | Refills: 3 | Status: SHIPPED | OUTPATIENT
Start: 2021-11-04

## 2021-11-04 NOTE — PROGRESS NOTES
Nils Sinclair presents today for   Chief Complaint   Patient presents with    Follow-up     8 month follow up        Nils Sinclair preferred language for health care discussion is english/other. Is someone accompanying this pt? no    Is the patient using any DME equipment during [address]? no    Depression Screening:  3 most recent PHQ Screens 11/4/2021   Little interest or pleasure in doing things Not at all   Feeling down, depressed, irritable, or hopeless Not at all   Total Score PHQ 2 0       Learning Assessment:  Learning Assessment 8/26/2021   PRIMARY LEARNER Patient   HIGHEST LEVEL OF EDUCATION - PRIMARY LEARNER  DID NOT GRADUATE [facility] PRIMARY LEARNER NONE   CO-LEARNER CAREGIVER No   PRIMARY LANGUAGE ENGLISH    NEED No   LEARNER PREFERENCE PRIMARY LISTENING     -   LEARNING SPECIAL TOPICS No   ANSWERED BY patient   RELATIONSHIP SELF       Abuse Screening:  Abuse Screening Questionnaire 11/4/2021   Do you ever feel afraid of your partner? N   Are you in a relationship with someone who physically or mentally threatens you? N   Is it safe for you to go home? Y       Fall Risk  Fall Risk Assessment, last 12 mths 9/25/2020   Able to walk? Yes   Fall in past 12 months? No       Pt currently taking Anticoagulant therapy? no    Coordination of Care:  1. Have you been to the ER, urgent care clinic since your last visit? Hospitalized since your last visit? no    2. Have you seen or consulted any other health care providers outside of the [address] [place] [address] since your last visit? Include any pap smears or colon screening.  no

## 2021-11-04 NOTE — PROGRESS NOTES
Toyin Stafford    f/u NSTEMI/ nonatherosclerotic MI  C/o CP x 4 months    HPI    Toyin Stafford is a 61 y.o.  perimenopausal woman with HTN, presented to 2018 ER c/o URI symptoms including CP- ended up NSTEMI, initial cath called \"normal coronaries. \" Due to continued pain, another cath few days later revealed an occluded mid/ distal OM2. Also FMD of right iliac artery noted. Her EF remained normal without any WMA. I reviewed her hospital records in detail, and have elaborated the findings:    · NSTEMI with troponin up to 7.66, initial ECG without new ischemic changes, presented with CP. Recent NSTEMI with peak troponin 13.4 with nonobstructive CAD by cath as noted below. · Nonobstructive CAD by cath (LM normal, LAD minimal luminal irregularities, left Cx normal, RCA normal) by Dr. James Velasquez 2018. Possible transient thrombus vs vasospasm. · Hypertension with elevated BP on admission. · Upper respiratory infection, recent cough and congestion with low grade fever 99.5 without leukocytosis. · Normal LV function 56-60% by echo 2018 without WMA 2018. · Mild to moderate tricuspid valve regurgitation with mild pulmonary hypertension PASP 43mmHg by echo 2018. She was discharged on medical therapy (included statin, BB, CCB, plavix). She was tried on Imdur but it gave her headaches. Since the hospitalization, she is significantly better but says almost every night she could feel some chest pressure. It goes away as soon as she takes her night medications. Otherwise, no LE swelling, no SOB. She has just started walking again but hasnt returned to work. She works in home care. She went for a second opinion at [facility]. Reviewed these records and see that patient was sent to cardiac rehab. Medications were adjusted which included stopping metoprolol and starting carvedilol and stopping nifedipine and starting chlorthalidone.   Her CP improved over the next several months, and she completed cardiac rehab. She never followed up with me as directed and shows up after 1.5 yrs. I asked her to stop her Plavix (and she didn't) but otherwise she was doing well and without CP at that time. She comes today, now saying for 4 months has been having CP with exertion. She was working 5-6 days a week, and with pushing/ moving patients it was too much. She has cut down to 2 days a week, and has noticed significant improvement in the CP. It goes away with rest and says doesn't last long enough to take a nitro. Past Medical History:   Diagnosis Date    Anemia     Hypertension     [facility]     NSTEMI (non-ST elevated myocardial infarction) ([facility]     12/2018       Past Surgical History:   Procedure Laterality Date    HX APPENDECTOMY      HX HEART CATHETERIZATION         Current Outpatient Medications   Medication Sig Dispense Refill    traZODone (DESYREL) 50 mg tablet TAKE ONE TABLET BY MOUTH ONCE NIGHTLY 90 Tablet 0    chlorthalidone (HYGROTON) 25 mg tablet TAKE ONE TABLET BY MOUTH DAILY 90 Tablet 0    carvediloL (COREG) 12.5 mg tablet TAKE ONE TABLET BY MOUTH TWICE A  Tablet 1    lisinopriL (PRINIVIL, ZESTRIL) 10 mg tablet TAKE ONE TABLET BY MOUTH DAILY 90 Tablet 1    atorvastatin (LIPITOR) 40 mg tablet TAKE ONE TABLET BY MOUTH EVERY NIGHT AT BEDTIME 90 Tablet 1    albuterol (PROVENTIL HFA, VENTOLIN HFA, PROAIR HFA) 90 mcg/actuation inhaler Take 2 Puffs by inhalation every four (4) hours as needed for Wheezing. 1 Inhaler 0    predniSONE (STERAPRED) 5 mg dose pack See administration instruction per 5mg dose pack (Patient not taking: Reported on 8/26/2021) 21 Tab 0    aspirin 81 mg chewable tablet Take 1 Tab by mouth daily. 90 Tab 3    nitroglycerin (NITROSTAT) 0.4 mg SL tablet 1 Tab by SubLINGual route every five (5) minutes as needed for Chest Pain. Up to 3 doses. 1 Bottle 1    Cholecalciferol, Vitamin D3, 1,000 unit cap Take 1,000 Units by mouth daily. Allergies   Allergen Reactions    Norvasc [Amlodipine] Swelling     Ankle swelling        Social History     Socioeconomic History    Marital status:      Spouse name: Not on file    Number of children: Not on file    Years of education: Not on file    Highest education level: Not on file   Occupational History    Not on file   Tobacco Use    Smoking status: Never Smoker    Smokeless tobacco: Never Used   Substance and Sexual Activity    Alcohol use: No     Alcohol/week: 0.0 standard drinks    Drug use: No    Sexual activity: Yes     Partners: Male     Birth control/protection: Surgical     Comment: tubal ligation   Other Topics Concern    Not on file   Social History Narrative    Not on file     Social Determinants of Health     Financial Resource Strain:     Difficulty of Paying Living Expenses: Not on file   Food Insecurity:     Worried About Running Out of Food in the Last Year: Not on file    Mj of Food in the Last Year: Not on file   Transportation Needs:     Lack of Transportation (Medical): Not on file    Lack of Transportation (Non-Medical):  Not on file   Physical Activity:     Days of Exercise per Week: Not on file    Minutes of Exercise per Session: Not on file   Stress:     Feeling of Stress : Not on file   Social Connections:     Frequency of Communication with Friends and Family: Not on file    Frequency of Social Gatherings with Friends and Family: Not on file    Attends Synagogue Services: Not on file    Active Member of Clubs or Organizations: Not on file    Attends Club or Organization Meetings: Not on file    Marital Status: Not on file   Intimate Partner Violence:     Fear of Current or Ex-Partner: Not on file    Emotionally Abused: Not on file    Physically Abused: Not on file    Sexually Abused: Not on file   Housing Stability:     Unable to Pay for Housing in the Last Year: Not on file    Number of [place] in the Last Year: Not on file  Unstable Housing in the Last Year: Not on file    Home care    No significant FH of premature CAD or SCD. Review of Systems    14 pt Review of Systems is negative unless otherwise mentioned in the HPI. Wt Readings from Last 3 Encounters:   11/04/21 73.9 kg (163 lb)   08/26/21 72.8 kg (160 lb 9.6 oz)   05/11/21 71.8 kg (158 lb 3.2 oz)     Temp Readings from Last 3 Encounters:   08/26/21 97.1 °F (36.2 °C) (Temporal)   05/11/21 97.3 °F (36.3 °C) (Temporal)   04/25/21 99.2 °F (37.3 °C)     BP Readings from Last 3 Encounters:   11/04/21 130/82   08/26/21 116/78   05/11/21 104/72     Pulse Readings from Last 3 Encounters:   11/04/21 61   08/26/21 67   05/11/21 63       12/11/18   ECHO ADULT FOLLOW-UP OR LIMITED 12/12/2018 12/12/2018    Narrative · Estimated left ventricular ejection fraction is 56 - 60%. Visually   measured ejection fraction. Left ventricular mild concentric hypertrophy. Normal left ventricular wall motion, no regional wall motion abnormality   noted. Signed by: Rikki Tran MD       Physical Exam:    Visit Vitals  /82 (BP 1 Location: Left upper arm, BP Patient Position: Sitting, BP Cuff Size: Adult)   Pulse 61   Ht 5' 1\" (1.549 m)   Wt 73.9 kg (163 lb)   LMP 08/28/2017   SpO2 99%   BMI 30.80 kg/m²      Physical Exam  Constitutional:       General: She is not in acute distress. Appearance: She is well-developed. She is not diaphoretic. HENT:      Head: Normocephalic and atraumatic. Eyes:      Pupils: Pupils are equal, round, and reactive to light. Neck:      Vascular: No JVD. Cardiovascular:      Rate and Rhythm: Normal rate and regular rhythm. Heart sounds: Normal heart sounds. No murmur heard. No friction rub. No gallop. Pulmonary:      Effort: Pulmonary effort is normal. No respiratory distress. Breath sounds: Normal breath sounds. No wheezing or rales. Chest:      Chest wall: No tenderness.    Abdominal:      General: Bowel sounds are normal. Palpations: Abdomen is soft. Musculoskeletal:         General: No tenderness. Skin:     General: Skin is warm and dry. Neurological:      Mental Status: She is alert and oriented to person, place, and time. Last EKG: NSR, normal axis and intervals, no ST segment abnormalities    Lab Results   Component Value Date/Time    Cholesterol, total 138 09/25/2020 09:59 AM    HDL Cholesterol 46 09/25/2020 09:59 AM    LDL, calculated 69.6 09/25/2020 09:59 AM    VLDL, calculated 22.4 09/25/2020 09:59 AM    Triglyceride 112 09/25/2020 09:59 AM    CHOL/HDL Ratio 3.0 09/25/2020 09:59 AM     Impression and Plan:  Raleigh Booth is a 61 y.o. with:    1.) S/p NSTEMI, likely due to nonatherosclerotic MI- SCAD +/- vasospastic angina (OM2) 2018  2.) Fibromuscular dysplasia noted iliac on cath  3.) HTN, avg SBP goal <140s, (not at goal but did not take any am meds yet)  4.) Dyslipidemia  5.) Normal LV function    1.) BB to control angina, while nitrate responsive- not tolerating nitrates due to H/a, Coreg was increased last time to 25 mg BID which seems to be working well  2.) Doesn't seem like she needs all her BP meds, I asked her to stop the thiazide last time (as well as her Plavix) and for some reason she did not follow recommendations, so today we discussed stopping Plavix and she seemed comfortable. >>60 mins spent explaining importance of above    Thank you for allowing me to participate in the care of your patient, please do not hesitate to call with questions or concerns.     [address],    Abbishaylee Mena, DO

## 2021-12-27 ENCOUNTER — HOSPITAL ENCOUNTER (OUTPATIENT)
Dept: LAB | Age: 56
Discharge: HOME | End: 2021-12-27
Payer: OTHER GOVERNMENT

## 2021-12-27 ENCOUNTER — OFFICE VISIT (OUTPATIENT)
Dept: FAMILY MEDICINE CLINIC | Age: 56
End: 2021-12-27
Payer: OTHER GOVERNMENT

## 2021-12-27 VITALS
TEMPERATURE: 96.9 F | DIASTOLIC BLOOD PRESSURE: 82 MMHG | HEIGHT: 61 IN | OXYGEN SATURATION: 98 % | BODY MASS INDEX: 30.21 KG/M2 | HEART RATE: 60 BPM | RESPIRATION RATE: 16 BRPM | WEIGHT: 160 LBS | SYSTOLIC BLOOD PRESSURE: 108 MMHG

## 2021-12-27 DIAGNOSIS — Z01.419 WELL WOMAN EXAM WITH ROUTINE GYNECOLOGICAL EXAM: ICD-10-CM

## 2021-12-27 DIAGNOSIS — E61.1 IRON DEFICIENCY: ICD-10-CM

## 2021-12-27 DIAGNOSIS — R73.01 IMPAIRED FASTING BLOOD SUGAR: ICD-10-CM

## 2021-12-27 DIAGNOSIS — E78.00 PURE HYPERCHOLESTEROLEMIA: ICD-10-CM

## 2021-12-27 DIAGNOSIS — R05.9 COUGH: Primary | ICD-10-CM

## 2021-12-27 DIAGNOSIS — I25.2 H/O NON-ST ELEVATION MYOCARDIAL INFARCTION (NSTEMI): ICD-10-CM

## 2021-12-27 DIAGNOSIS — R06.2 WHEEZING: ICD-10-CM

## 2021-12-27 DIAGNOSIS — E55.9 VITAMIN D DEFICIENCY: ICD-10-CM

## 2021-12-27 LAB
ALBUMIN SERPL-MCNC: 4.2 G/DL (ref 3.4–5)
ALBUMIN/GLOB SERPL: 1.2 {RATIO} (ref 0.8–1.7)
ALP SERPL-CCNC: 94 U/L (ref 45–117)
ALT SERPL-CCNC: 27 U/L (ref 13–56)
ANION GAP SERPL CALC-SCNC: 6 MMOL/L (ref 3–18)
AST SERPL-CCNC: 23 U/L (ref 10–38)
BILIRUB SERPL-MCNC: 0.5 MG/DL (ref 0.2–1)
BUN SERPL-MCNC: 19 MG/DL (ref 7–18)
BUN/CREAT SERPL: 17 (ref 12–20)
CALCIUM SERPL-MCNC: 9.3 MG/DL (ref 8.5–10.1)
CHLORIDE SERPL-SCNC: 106 MMOL/L (ref 100–111)
CO2 SERPL-SCNC: 28 MMOL/L (ref 21–32)
CREAT SERPL-MCNC: 1.12 MG/DL (ref 0.6–1.3)
EST. AVERAGE GLUCOSE BLD GHB EST-MCNC: 131 MG/DL
GLOBULIN SER CALC-MCNC: 3.4 G/DL (ref 2–4)
GLUCOSE SERPL-MCNC: 107 MG/DL (ref 74–99)
HBA1C MFR BLD: 6.2 % (ref 4.2–5.6)
POTASSIUM SERPL-SCNC: 3.6 MMOL/L (ref 3.5–5.5)
PROT SERPL-MCNC: 7.6 G/DL (ref 6.4–8.2)
SODIUM SERPL-SCNC: 140 MMOL/L (ref 136–145)

## 2021-12-27 PROCEDURE — 80053 COMPREHEN METABOLIC PANEL: CPT

## 2021-12-27 PROCEDURE — 80061 LIPID PANEL: CPT

## 2021-12-27 PROCEDURE — 83036 HEMOGLOBIN GLYCOSYLATED A1C: CPT

## 2021-12-27 PROCEDURE — 99214 OFFICE O/P EST MOD 30 MIN: CPT | Performed by: FAMILY MEDICINE

## 2021-12-27 PROCEDURE — 36415 COLL VENOUS BLD VENIPUNCTURE: CPT

## 2021-12-27 RX ORDER — ALBUTEROL SULFATE 90 UG/1
2 AEROSOL, METERED RESPIRATORY (INHALATION)
Qty: 1 EACH | Refills: 1 | Status: SHIPPED | OUTPATIENT
Start: 2021-12-27

## 2021-12-27 NOTE — PROGRESS NOTES
HISTORY OF PRESENT ILLNESS  Steven Baig is a 61 y.o. female. HPI: Here for follow-up. History of coronary artery disease. Recently had a follow-up with cardiology. Reviewed their notes. No anginal symptoms at this time. Sitting comfortable without any acute distress. She was recommended to stop Plavix from cardiology which she has. Also mentioned in the note above thiazide to be stopped. Send a staff message to Dr. Lacho Castillo for clarification. Patient is still on medication. Blood pressure fairly stable. Denies any headache or dizziness. No chest pain or shortness of breath. No palpitation or diaphoresis. No nausea vomiting or abdominal pain. No urinary or bowel complaint. No cold cough wheezing or any fatigue. No mood changes. Prediabetes. She is working on diet modification. Pending lab results. Taking medications with compliance. No side effects. Asking for albuterol refill as she felt one-time shortness of breath on climbing stairs. I have discussed if it happens often she needs to contact cardiologist.  She understood it well. Recently not taken any nitrates for anginal symptoms. Anemia. She has been following [place] [facility] Dr. Reyna Gonzalez. Reviewed their notes and recommendation. Currently taking multivitamin per the recommendation. Normocytic anemia due to folate deficiency. No evidence of iron deficiency anemia. Visit Vitals  /82 (BP 1 Location: Left upper arm, BP Patient Position: Sitting, BP Cuff Size: Adult)   Pulse 60   Temp 96.9 °F (36.1 °C) (Temporal)   Resp 16   Ht 5' 1\" (1.549 m)   Wt 160 lb (72.6 kg)   SpO2 98%   BMI 30.23 kg/m²     Review medication list, vitals, problem list,allergies.    Lab Results   Component Value Date/Time    WBC 6.4 09/25/2020 09:59 AM    HGB 10.5 (L) 09/25/2020 09:59 AM    HCT 33.0 (L) 09/25/2020 09:59 AM    PLATELET 945 68/99/8085 09:59 AM    MCV 88.7 09/25/2020 09:59 AM     Lab Results   Component Value Date/Time    Sodium 141 09/25/2020 09:59 AM    Potassium 4.3 09/25/2020 09:59 AM    Chloride 107 09/25/2020 09:59 AM    CO2 31 09/25/2020 09:59 AM    Anion gap 3 09/25/2020 09:59 AM    Glucose 89 09/25/2020 09:59 AM    BUN 17 09/25/2020 09:59 AM    Creatinine 1.01 09/25/2020 09:59 AM    BUN/Creatinine ratio 17 09/25/2020 09:59 AM    GFR est AA >60 09/25/2020 09:59 AM    GFR est non-AA 57 (L) 09/25/2020 09:59 AM    Calcium 9.4 09/25/2020 09:59 AM    Bilirubin, total 0.5 09/25/2020 09:59 AM    Alk. phosphatase 104 09/25/2020 09:59 AM    Protein, total 7.2 09/25/2020 09:59 AM    Albumin 4.1 09/25/2020 09:59 AM    Globulin 3.1 09/25/2020 09:59 AM    A-G Ratio 1.3 09/25/2020 09:59 AM    ALT (SGPT) 24 09/25/2020 09:59 AM    AST (SGOT) 16 09/25/2020 09:59 AM     Lab Results   Component Value Date/Time    Cholesterol, total 138 09/25/2020 09:59 AM    HDL Cholesterol 46 09/25/2020 09:59 AM    LDL, calculated 69.6 09/25/2020 09:59 AM    VLDL, calculated 22.4 09/25/2020 09:59 AM    Triglyceride 112 09/25/2020 09:59 AM    CHOL/HDL Ratio 3.0 09/25/2020 09:59 AM     Lab Results   Component Value Date/Time    TSH 1.040 06/09/2014 12:00 AM     Lab Results   Component Value Date/Time    Hemoglobin A1c 5.9 (H) 09/25/2020 09:59 AM     Lab Results   Component Value Date/Time    Vitamin D 25-Hydroxy 40.6 05/24/2017 09:25 AM           ROS: See HPI    Physical Exam  Constitutional:       General: She is not in acute distress. Cardiovascular:      Rate and Rhythm: Normal rate and regular rhythm. Heart sounds: Normal heart sounds. Abdominal:      General: Bowel sounds are normal.      Palpations: Abdomen is soft. Tenderness: There is no abdominal tenderness. Musculoskeletal:         General: No swelling. Cervical back: Neck supple. Neurological:      Mental Status: She is oriented to person, place, and time. Psychiatric:         Behavior: Behavior normal.         ASSESSMENT and PLAN    ICD-10-CM ICD-9-CM    1. Cough: At this time stable. Given albuterol as needed. R05.9 786.2 albuterol (PROVENTIL HFA, VENTOLIN HFA, PROAIR HFA) 90 mcg/actuation inhaler   2. Wheezing: No more wheezing R06.2 786.07 albuterol (PROVENTIL HFA, VENTOLIN HFA, PROAIR HFA) 90 mcg/actuation inhaler   3. Iron deficiency: Normocytic anemia. Following . Recommended multivitamin. E61.1 280.9    4. Vitamin D deficiency: On multivitamin: Working on diet modification. Pending lab results E55.9 268.9    5. Impaired fasting blood sugar  R73.01 790.21    6. H/O non-ST elevation myocardial infarction (NSTEMI): On risk factor management. Following cardiology. Recently stopped Plavix. Sent staff message regarding continuation of chlorthalidone. I25.2 [id number]    Patient understood agreed with the plan  Review health maintenance  She will take the booster dose from the pharmacy for Covid vaccine  Follow-up and Dispositions    · Return in about 3 months (around 3/27/2022). Please note that this dictation was completed with [employer], the [employer] voice recognition software. Quite often unanticipated grammatical, syntax, homophones, and other interpretive errors are inadvertently transcribed by the computer software. Please disregard these errors. Please excuse any errors that have escaped final proofreading.

## 2021-12-27 NOTE — PATIENT INSTRUCTIONS
Prediabetes: Care Instructions  Overview     Prediabetes is a warning sign that you're at risk for getting type 2 diabetes. It means that your blood sugar is higher than it should be. But it's not high enough to be diabetes. The food you eat naturally turns into sugar. Your body uses the sugar for energy. Normally, an organ called the pancreas makes insulin. And insulin allows the sugar in your blood to get into your body's cells. But sometimes the body can't use insulin the right way. So the sugar stays in your blood instead. This is called insulin resistance. The buildup of sugar in your blood means you have prediabetes. The good news is that you may be able to prevent or delay diabetes. Making small lifestyle changes, like getting active and changing your eating habits, may help you get your blood sugar back to normal. You can work with your doctor to make a treatment plan. Follow-up care is a key part of your treatment and safety. Be sure to make and go to all appointments, and call your doctor if you are having problems. It's also a good idea to know your test results and keep a list of the medicines you take. How can you care for yourself at home? · Watch your weight. A healthy weight helps your body use insulin properly. · Limit the amount of calories, sweets, and unhealthy fat you eat. Ask your doctor if you should see a dietitian. A registered dietitian can help you create meal plans that fit your lifestyle. · Get at least 30 minutes of exercise on most days of the week. Exercise helps control your blood sugar. It also helps you maintain a healthy weight. Walking is a good choice. You also may want to do other activities, such as running, swimming, cycling, or playing tennis or team sports. · Do not smoke. Smoking can make prediabetes worse. If you need help quitting, talk to your doctor about stop-smoking programs and medicines. These can increase your chances of quitting for good.   · If your doctor prescribed medicines, take them exactly as prescribed. Call your doctor if you think you are having a problem with your medicine. You will get more details on the specific medicines your doctor prescribes. When should you call for help? Watch closely for changes in your health, and be sure to contact your doctor if:    · You have any symptoms of diabetes. These may include:  ? Being thirsty more often. ? Urinating more. ? Being hungrier. ? Losing weight. ? Being very tired. ? Having blurry vision.     · You have a wound that will not heal.     · You have an infection that will not go away.     · You have problems with your blood pressure.     · You want more information about diabetes and how you can keep from getting it. Where can you learn more? Go to http://www.gray.com/  Enter I222 in the search box to learn more about \"Prediabetes: Care Instructions. \"  Current as of: August 31, 2020               Content Version: 13.0  © 2006-2021 [employer]. Care instructions adapted under license by [employer] (which disclaims liability or warranty for this information). If you have questions about a medical condition or this instruction, always ask your healthcare professional. [address] any warranty or liability for your use of this information. A Healthy Lifestyle: Care Instructions  Your Care Instructions     A healthy lifestyle can help you feel good, stay at a healthy weight, and have plenty of energy for both work and play. A healthy lifestyle is something you can share with your whole family. A healthy lifestyle also can lower your risk for serious health problems, such as high blood pressure, heart disease, and diabetes. You can follow a few steps listed below to improve your health and the health of your family. Follow-up care is a key part of your treatment and safety.  Be sure to make and go to all appointments, and call your doctor if you are having problems. It's also a good idea to know your test results and keep a list of the medicines you take. How can you care for yourself at home? · Do not eat too much sugar, fat, or fast foods. You can still have dessert and treats now and then. The goal is moderation. · Start small to improve your eating habits. Pay attention to portion sizes, drink less juice and soda pop, and eat more fruits and vegetables. ? Eat a healthy amount of food. A 3-ounce serving of meat, for example, is about the size of a deck of cards. Fill the rest of your plate with vegetables and whole grains. ? Limit the amount of soda and sports drinks you have every day. Drink more water when you are thirsty. ? Eat plenty of fruits and vegetables every day. Have an apple or some carrot sticks as an afternoon snack instead of a candy bar. Try to have fruits and/or vegetables at every meal.  · Make exercise part of your daily routine. You may want to start with simple activities, such as walking, bicycling, or slow swimming. Try to be active 30 to 60 minutes every day. You do not need to do all 30 to 60 minutes all at once. For example, you can exercise 3 times a day for 10 or 20 minutes. Moderate exercise is safe for most people, but it is always a good idea to talk to your doctor before starting an exercise program.  · Keep moving. [employer] the lawn, work in the garden, or roomlinx. Take the stairs instead of the elevator at work. · If you smoke, quit. People who smoke have an increased risk for heart attack, stroke, cancer, and other lung illnesses. Quitting is hard, but there are ways to boost your chance of quitting tobacco for good. ? Use nicotine gum, patches, or lozenges. ? Ask your doctor about stop-smoking programs and medicines. ? Keep trying.   In addition to reducing your risk of diseases in the future, you will notice some benefits soon after you stop using tobacco. If you have shortness of breath or asthma symptoms, they will likely get better within a few weeks after you quit. · Limit how much alcohol you drink. Moderate amounts of alcohol (up to 2 drinks a day for men, 1 drink a day for women) are okay. But drinking too much can lead to liver problems, high blood pressure, and other health problems. Family health  If you have a family, there are many things you can do together to improve your health. · Eat meals together as a family as often as possible. · Eat healthy foods. This includes fruits, vegetables, lean meats and dairy, and whole grains. · Include your family in your fitness plan. Most people think of activities such as jogging or tennis as the way to fitness, but there are many ways you and your family can be more active. Anything that makes you breathe hard and gets your heart pumping is exercise. Here are some tips:  ? Walk to do errands or to take your child to school or the bus.  ? Go for a family bike ride after dinner instead of watching TV. Where can you learn more? Go to http://www.gray.com/  Enter O864 in the search box to learn more about \"A Healthy Lifestyle: Care Instructions. \"  Current as of: June 16, 2021               Content Version: 13.0  © 2006-2021 [employer]. Care instructions adapted under license by [employer] (which disclaims liability or warranty for this information). If you have questions about a medical condition or this instruction, always ask your healthcare professional. [place] [address] any warranty or liability for your use of this information. Learning About Coronary Artery Disease (CAD)  What is coronary artery disease? Coronary artery disease is a condition that occurs when plaque builds up in the arteries that bring oxygen-rich blood to your heart. Plaque is a fatty substance made of cholesterol, calcium, and other substances in the blood.  This process is called hardening of the arteries, or atherosclerosis. What happens when you have coronary artery disease? · Plaque may narrow the coronary arteries. Narrowed arteries cause poor blood flow. This can lead to angina symptoms such as chest pain or discomfort. If blood flow is completely blocked, you could have a heart attack. · You can slow and reduce the risk of future problems by making changes in your lifestyle. These include quitting smoking and eating heart-healthy foods. · Treatment, along with changes in your lifestyle, can help you live a longer and healthier life. How can you prevent coronary artery disease? · Do not smoke. It may be the best thing you can do to prevent coronary artery disease. If you need help quitting, talk to your doctor about stop-smoking programs and medicines. These can increase your chances of quitting for good. · Be active. Try to do moderate activity at least 2½ hours a week. Or try to do vigorous activity at least 1¼ hours a week. You may want to walk or try other activities, such as running, swimming, cycling, or playing tennis or team sports. · Eat heart-healthy foods. Eat more fruits and vegetables and less food that contains saturated and trans fats. Limit alcohol, sodium, and sweets. · Stay at a healthy weight. Lose weight if you need to. · Manage other health problems such as diabetes, high blood pressure, and high cholesterol. How is coronary artery disease treated? · Your doctor will suggest that you make lifestyle changes. For example, your doctor may ask you to eat healthy foods, quit smoking, lose extra weight, and be more active. · You will take medicines that help prevent a heart attack. · Your doctor may suggest a procedure to open narrowed or blocked arteries. This is called angioplasty. Or your doctor may suggest using healthy blood vessels to create detours around narrowed or blocked arteries. This is called bypass surgery.   Follow-up care is a key part of your treatment and safety. Be sure to make and go to all appointments, and call your doctor if you are having problems. It's also a good idea to know your test results and keep a list of the medicines you take. Where can you learn more? Go to http://www.gray.com/  Enter C643 in the search box to learn more about \"Learning About Coronary Artery Disease (CAD). \"  Current as of: April 29, 2021               Content Version: 13.0  © 2006-2021 [employer]. Care instructions adapted under license by [employer] (which disclaims liability or warranty for this information). If you have questions about a medical condition or this instruction, always ask your healthcare professional. [address] any warranty or liability for your use of this information.

## 2021-12-27 NOTE — PROGRESS NOTES
1. Have you been to the ER, urgent care clinic since your last visit? Hospitalized since your last visit? No    2. Have you seen or consulted any other health care providers outside of the [address] [place] [address] since your last visit? Include any pap smears or colon screening.  No    Chief Complaint   Patient presents with    Blood sugar problem    Cholesterol Problem    Coronary Artery Disease    Anemia

## 2021-12-28 LAB
CHOLEST SERPL-MCNC: 140 MG/DL
HDLC SERPL-MCNC: 49 MG/DL (ref 40–60)
HDLC SERPL: 2.9 {RATIO} (ref 0–5)
LDLC SERPL CALC-MCNC: 66.2 MG/DL (ref 0–100)
LIPID PROFILE,FLP: NORMAL
TRIGL SERPL-MCNC: 124 MG/DL (ref ?–150)
VLDLC SERPL CALC-MCNC: 24.8 MG/DL

## 2022-01-03 NOTE — PROGRESS NOTES
A1C went up compare to before. More compliance with diet modification. Other labs including CMP and lipid panel stable. Continue current plan. Further discussion on follow up visit.

## 2022-02-03 NOTE — PROGRESS NOTES
Contacted patient and verified identity using name and date of birth (2- identifiers)  Spoke with patient and she verbalized understanding of A1C went up compare to before. More compliance with diet modification. Other labs including CMP and lipid panel stable. Continue current plan. Further discussion on follow up visit.

## 2022-02-13 ENCOUNTER — HOSPITAL ENCOUNTER (EMERGENCY)
Age: 57
Discharge: HOME | End: 2022-02-13
Attending: EMERGENCY MEDICINE
Payer: OTHER GOVERNMENT

## 2022-02-13 ENCOUNTER — APPOINTMENT (OUTPATIENT)
Dept: CT IMAGING | Age: 57
End: 2022-02-13
Attending: EMERGENCY MEDICINE
Payer: OTHER GOVERNMENT

## 2022-02-13 VITALS
HEIGHT: 61 IN | RESPIRATION RATE: 16 BRPM | BODY MASS INDEX: 30.58 KG/M2 | SYSTOLIC BLOOD PRESSURE: 118 MMHG | OXYGEN SATURATION: 100 % | DIASTOLIC BLOOD PRESSURE: 60 MMHG | WEIGHT: 162 LBS | TEMPERATURE: 97.5 F | HEART RATE: 62 BPM

## 2022-02-13 DIAGNOSIS — R10.9 LEFT FLANK PAIN: Primary | ICD-10-CM

## 2022-02-13 LAB
ANION GAP SERPL CALC-SCNC: 5 MMOL/L (ref 3–18)
APPEARANCE UR: CLEAR
BASOPHILS # BLD: 0 K/UL (ref 0–0.1)
BASOPHILS NFR BLD: 0 % (ref 0–2)
BILIRUB UR QL: NEGATIVE
BUN SERPL-MCNC: 18 MG/DL (ref 7–18)
BUN/CREAT SERPL: 16 (ref 12–20)
CALCIUM SERPL-MCNC: 8.8 MG/DL (ref 8.5–10.1)
CHLORIDE SERPL-SCNC: 104 MMOL/L (ref 100–111)
CO2 SERPL-SCNC: 28 MMOL/L (ref 21–32)
COLOR UR: YELLOW
CREAT SERPL-MCNC: 1.11 MG/DL (ref 0.6–1.3)
DIFFERENTIAL METHOD BLD: ABNORMAL
EOSINOPHIL # BLD: 0.4 K/UL (ref 0–0.4)
EOSINOPHIL NFR BLD: 5 % (ref 0–5)
ERYTHROCYTE [DISTWIDTH] IN BLOOD BY AUTOMATED COUNT: 13.2 % (ref 11.6–14.5)
GLUCOSE SERPL-MCNC: 94 MG/DL (ref 74–99)
GLUCOSE UR STRIP.AUTO-MCNC: NEGATIVE MG/DL
HCT VFR BLD AUTO: 30.5 % (ref 35–45)
HGB BLD-MCNC: 10.3 G/DL (ref 12–16)
HGB UR QL STRIP: NEGATIVE
IMM GRANULOCYTES # BLD AUTO: 0 K/UL (ref 0–0.04)
IMM GRANULOCYTES NFR BLD AUTO: 0 % (ref 0–0.5)
KETONES UR QL STRIP.AUTO: NEGATIVE MG/DL
LEUKOCYTE ESTERASE UR QL STRIP.AUTO: NEGATIVE
LYMPHOCYTES # BLD: 3.1 K/UL (ref 0.9–3.6)
LYMPHOCYTES NFR BLD: 38 % (ref 21–52)
MCH RBC QN AUTO: 29.4 PG (ref 24–34)
MCHC RBC AUTO-ENTMCNC: 33.8 G/DL (ref 31–37)
MCV RBC AUTO: 87.1 FL (ref 78–100)
MONOCYTES # BLD: 0.7 K/UL (ref 0.05–1.2)
MONOCYTES NFR BLD: 9 % (ref 3–10)
NEUTS SEG # BLD: 3.9 K/UL (ref 1.8–8)
NEUTS SEG NFR BLD: 48 % (ref 40–73)
NITRITE UR QL STRIP.AUTO: NEGATIVE
NRBC # BLD: 0 K/UL (ref 0–0.01)
NRBC BLD-RTO: 0 PER 100 WBC
PH UR STRIP: 5 [PH] (ref 5–8)
PLATELET # BLD AUTO: 334 K/UL (ref 135–420)
PMV BLD AUTO: 10.6 FL (ref 9.2–11.8)
POTASSIUM SERPL-SCNC: 3.7 MMOL/L (ref 3.5–5.5)
PROT UR STRIP-MCNC: NEGATIVE MG/DL
RBC # BLD AUTO: 3.5 M/UL (ref 4.2–5.3)
SODIUM SERPL-SCNC: 137 MMOL/L (ref 136–145)
SP GR UR REFRACTOMETRY: 1.01 (ref 1–1.03)
UROBILINOGEN UR QL STRIP.AUTO: 0.2 EU/DL (ref 0.2–1)
WBC # BLD AUTO: 8.2 K/UL (ref 4.6–13.2)

## 2022-02-13 PROCEDURE — 99283 EMERGENCY DEPT VISIT LOW MDM: CPT

## 2022-02-13 PROCEDURE — 74177 CT ABD & PELVIS W/CONTRAST: CPT

## 2022-02-13 PROCEDURE — 74011250636 HC RX REV CODE- 250/636: Performed by: EMERGENCY MEDICINE

## 2022-02-13 PROCEDURE — 85025 COMPLETE CBC W/AUTO DIFF WBC: CPT

## 2022-02-13 PROCEDURE — 74011000636 HC RX REV CODE- 636: Performed by: EMERGENCY MEDICINE

## 2022-02-13 PROCEDURE — 81003 URINALYSIS AUTO W/O SCOPE: CPT

## 2022-02-13 PROCEDURE — 80048 BASIC METABOLIC PNL TOTAL CA: CPT

## 2022-02-13 PROCEDURE — 96374 THER/PROPH/DIAG INJ IV PUSH: CPT

## 2022-02-13 RX ORDER — KETOROLAC TROMETHAMINE 15 MG/ML
15 INJECTION, SOLUTION INTRAMUSCULAR; INTRAVENOUS ONCE
Status: COMPLETED | OUTPATIENT
Start: 2022-02-13 | End: 2022-02-13

## 2022-02-13 RX ADMIN — KETOROLAC TROMETHAMINE 15 MG: 15 INJECTION, SOLUTION INTRAMUSCULAR; INTRAVENOUS at 13:29

## 2022-02-13 RX ADMIN — IOPAMIDOL 100 ML: 612 INJECTION, SOLUTION INTRAVENOUS at 12:20

## 2022-02-13 NOTE — ED PROVIDER NOTES
EMERGENCY DEPARTMENT HISTORY AND PHYSICAL EXAM    11:20 AM  Date: 2/13/2022  Patient Name: Rosa Lorenzo    History of Presenting Illness       History Provided By:     HPI: Rosa Lorenzo is a 61 y.o. female with medical history as below presents with left lower abdominal pain and flank pain that started on Friday. Pain is constant radiates to the groin, no associated symptoms or modifying factors. Patient denies any fever or chills. Denies any nausea vomiting diarrhea. Denies any chest pain. Denies any diarrhea constipation         PCP: Nikolas Rg MD    Past History     Past Medical History:  Past Medical History:   Diagnosis Date    Anemia     Hypertension     [facility] [address]     NSTEMI (non-ST elevated myocardial infarction) ([facility] [address]     12/2018       Past Surgical History:  Past Surgical History:   Procedure Laterality Date    HX APPENDECTOMY      HX HEART CATHETERIZATION         Family History:  Family History   Problem Relation Age of Onset    Breast Cancer Mother     Hypertension Mother     Diabetes Mother     Heart Failure Mother     Breast Cancer Sister     Breast Cancer Sister        Social History:  Social History     Tobacco Use    Smoking status: Never Smoker    Smokeless tobacco: Never Used   Substance Use Topics    Alcohol use: No     Alcohol/week: 0.0 standard drinks    Drug use: No       Allergies: Allergies   Allergen Reactions    Norvasc [Amlodipine] Swelling     Ankle swelling        Review of Systems   Review of Systems   Constitutional: Negative for activity change, appetite change and chills. HENT: Negative for congestion, ear discharge, ear pain and sore throat. Eyes: Negative for photophobia and pain. Respiratory: Negative for cough and choking. Cardiovascular: Negative for palpitations and leg swelling. Gastrointestinal: Negative for anal bleeding and rectal pain. Endocrine: Negative for polydipsia and polyuria.    Genitourinary: Negative for genital sores and urgency. Musculoskeletal: Negative for arthralgias and myalgias. Neurological: Negative for dizziness, seizures and speech difficulty. Psychiatric/Behavioral: Negative for hallucinations, self-injury and suicidal ideas. Physical Exam     No data found. Physical Exam  Vitals and nursing note reviewed. Constitutional:       Appearance: She is well-developed. HENT:      Head: Normocephalic and atraumatic. Eyes:      General:         Right eye: No discharge. Left eye: No discharge. Cardiovascular:      Rate and Rhythm: Normal rate and regular rhythm. Heart sounds: Normal heart sounds. No murmur heard. Pulmonary:      Effort: Pulmonary effort is normal. No respiratory distress. Breath sounds: Normal breath sounds. No stridor. No wheezing or rales. Chest:      Chest wall: No tenderness. Abdominal:      General: Bowel sounds are normal. There is no distension. Palpations: Abdomen is soft. Tenderness: There is no abdominal tenderness. There is left CVA tenderness. There is no guarding or rebound. Musculoskeletal:         General: Normal range of motion. Cervical back: Normal range of motion and neck supple. Skin:     General: Skin is warm and dry. Neurological:      Mental Status: She is alert and oriented to person, place, and time. Diagnostic Study Results     Labs -  No results found for this or any previous visit (from the past 12 hour(s)). Radiologic Studies -   CT ABD PELV W CONT    Result Date: 2/13/2022  No suspect findings of colonic inflammation. Question 2.3 cm right uterine and/or ovarian lesion. Pelvic ultrasound could be obtained for further evaluation. A few scattered small nonspecific lymph nodes. Correlate clinically. Follow-up could be obtained. Please see report for additional findings and further details. .        Medical Decision Making     ED Course: Progress Notes, Reevaluation, and Consults:    11:20 AM Initial assessment performed. The patients presenting problems have been discussed, and they/their family are in agreement with the care plan formulated and outlined with them. I have encouraged them to ask questions as they arise throughout their visit. Provider Notes (Medical Decision Making):   Patient presents with left lower abdominal pain and flank pain  Tenderness of the left flank    Plan to obtain labs, imaging  Old medical records reviewed:  Labs as interpreted by me: No leukocytosis, no electrolyte abnormality  UA negative for UTI  CT abdomen pelvis no findings of colonic inflammation  Incidental finding of questionable 2.3 cm right uterine ovarian lesion. Given that this is in the right would not explain patient's symptoms. Patient advised to follow-up with OB/GYN for ultrasound  Patient feels well on reassessment. Pain has now resolved. Abdomen short nontender  Strict return precautions given            Vital Signs-Reviewed the patient's vital signs. Reviewed pt's pulse ox reading. Records Reviewed: old medical records  -I am the first provider for this patient.  -I reviewed the vital signs, available nursing notes, past medical history, past surgical history, family history and social history. Current Outpatient Medications   Medication Sig Dispense Refill    albuterol (PROVENTIL HFA, VENTOLIN HFA, PROAIR HFA) 90 mcg/actuation inhaler Take 2 Puffs by inhalation every four (4) hours as needed for Wheezing. 1 Each 1    traZODone (DESYREL) 50 mg tablet TAKE ONE TABLET BY MOUTH ONCE NIGHTLY 90 Tablet 1    chlorthalidone (HYGROTON) 25 mg tablet Take 1 Tablet by mouth daily. 90 Tablet 3    carvediloL (COREG) 12.5 mg tablet Take 1 Tablet by mouth two (2) times a day. 180 Tablet 3    lisinopriL (PRINIVIL, ZESTRIL) 10 mg tablet Take 1 Tablet by mouth daily. 90 Tablet 3    atorvastatin (LIPITOR) 40 mg tablet Take 1 Tablet by mouth nightly.  90 Tablet 3    aspirin 81 mg chewable tablet Take 1 Tab by mouth daily. 90 Tab 3    nitroglycerin (NITROSTAT) 0.4 mg SL tablet 1 Tab by SubLINGual route every five (5) minutes as needed for Chest Pain. Up to 3 doses. 1 Bottle 1    Cholecalciferol, Vitamin D3, 1,000 unit cap Take 1,000 Units by mouth daily. Clinical Impression     Clinical Impression:   1. Left flank pain        Disposition:    Pt has been reexamined. Patient has no new complaints, changes, or physical findings. Care plan outlined and precautions discussed. Results were reviewed with the patient. All medications were reviewed with the patient; will d/c home with PMD f/u. All of pt's questions and concerns were addressed. Patient was instructed and agrees to follow up with PMD, as well as to return to the ED upon further deterioration. Patient is ready to go home. This note was dictated utilizing voice recognition software which may lead to typographical errors. I apologize in advance if the situation occurs. If questions arise please do not hesitate to contact me or call our department. This note was dictated utilizing voice recognition software which may lead to typographical errors. I apologize in advance if the situation occurs. If questions arise please do not hesitate to contact me or call our department.     Marilyn Dong MD  11:20 AM

## 2022-02-13 NOTE — DISCHARGE INSTRUCTIONS
Please follow-up with OB/GYN to have outpatient ultrasound for possible right uterine/ovarian lesion    Please follow-up with PMD for repeat CT to check lymph nodes

## 2022-02-13 NOTE — ED NOTES
Assumed care of patient, A&Ox4 resp even and unlabored, NAD noted or indicated, ambulates with steady gait. 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

## 2022-02-13 NOTE — ED TRIAGE NOTES
Left flank pain that wraps around to the front of the lower left abdomen. This is since Friday. Patient took two tylenol to help with the pain at 0900 am this morning but the pain persists.

## 2022-02-13 NOTE — ED NOTES
I have reviewed discharge instructions with pt. Pt verbalized understanding and has no further questions at this time. Education taught and patient verbalized understanding of education. Teach back method used. 20G IV removed, catheter tip intact on removal.  Armband removed and shredded per patients request.    Patients pain 0/10. Belongings are with pt. Patient discharged with self to home.

## 2022-02-28 ENCOUNTER — TRANSCRIBE ORDER (OUTPATIENT)
Dept: SCHEDULING | Age: 57
End: 2022-02-28

## 2022-02-28 DIAGNOSIS — Z12.31 VISIT FOR SCREENING MAMMOGRAM: Primary | ICD-10-CM

## 2022-03-18 PROBLEM — I10 ACCELERATED HYPERTENSION: Status: ACTIVE | Noted: 2018-12-12

## 2022-03-18 PROBLEM — I21.4 NSTEMI (NON-ST ELEVATED MYOCARDIAL INFARCTION) (HCC): Status: ACTIVE | Noted: 2018-12-12

## 2022-03-19 PROBLEM — R07.89 ATYPICAL CHEST PAIN: Status: ACTIVE | Noted: 2018-12-07

## 2022-03-19 PROBLEM — I25.10 CORONARY ARTERY DISEASE INVOLVING NATIVE CORONARY ARTERY: Status: ACTIVE | Noted: 2018-12-12

## 2022-03-19 PROBLEM — D64.9 ABSOLUTE ANEMIA: Status: ACTIVE | Noted: 2019-06-05

## 2022-03-19 PROBLEM — R73.03 PREDIABETES: Status: ACTIVE | Noted: 2019-06-05

## 2022-03-21 ENCOUNTER — HOSPITAL ENCOUNTER (OUTPATIENT)
Dept: MAMMOGRAPHY | Age: 57
Discharge: HOME | End: 2022-03-21
Attending: FAMILY MEDICINE
Payer: OTHER GOVERNMENT

## 2022-03-21 DIAGNOSIS — Z12.31 VISIT FOR SCREENING MAMMOGRAM: ICD-10-CM

## 2022-03-21 PROCEDURE — 77063 BREAST TOMOSYNTHESIS BI: CPT

## 2022-03-28 ENCOUNTER — OFFICE VISIT (OUTPATIENT)
Dept: FAMILY MEDICINE CLINIC | Age: 57
End: 2022-03-28
Payer: OTHER GOVERNMENT

## 2022-03-28 VITALS
HEART RATE: 67 BPM | TEMPERATURE: 97.3 F | RESPIRATION RATE: 16 BRPM | SYSTOLIC BLOOD PRESSURE: 128 MMHG | BODY MASS INDEX: 30.21 KG/M2 | HEIGHT: 61 IN | OXYGEN SATURATION: 98 % | DIASTOLIC BLOOD PRESSURE: 80 MMHG | WEIGHT: 160 LBS

## 2022-03-28 DIAGNOSIS — D64.9 CHRONIC ANEMIA: ICD-10-CM

## 2022-03-28 DIAGNOSIS — I25.10 CORONARY ARTERY DISEASE INVOLVING NATIVE CORONARY ARTERY OF NATIVE HEART WITHOUT ANGINA PECTORIS: ICD-10-CM

## 2022-03-28 DIAGNOSIS — R73.03 PREDIABETES: ICD-10-CM

## 2022-03-28 DIAGNOSIS — I21.4 NSTEMI (NON-ST ELEVATED MYOCARDIAL INFARCTION) (HCC): Primary | ICD-10-CM

## 2022-03-28 DIAGNOSIS — E55.9 VITAMIN D DEFICIENCY: ICD-10-CM

## 2022-03-28 DIAGNOSIS — H92.01 EARACHE ON RIGHT: ICD-10-CM

## 2022-03-28 DIAGNOSIS — R93.89 ABNORMAL CT SCAN: ICD-10-CM

## 2022-03-28 PROCEDURE — 99214 OFFICE O/P EST MOD 30 MIN: CPT | Performed by: FAMILY MEDICINE

## 2022-03-28 NOTE — PROGRESS NOTES
HISTORY OF PRESENT ILLNESS  Deya Hyatt is a 61 y.o. female. HPI: Here for follow-up. History of non-ST elevation MI. Had cath done twice. Plavix was stopped on her last cardiology appointment. No anginal symptoms. Sitting comfortable without any acute distress. Vitals been stable. Prediabetes. Working on lifestyle and diet modification. Walking as an exercise. Discussed importance of weight loss. Discussed high BMI and again addressed the importance of lifestyle modification which she is working on. Low H&H. Has been following . No unusual fatigue. Taking multivitamin as per the recommendation. Iron was stopped. Denies any shortness of breath. Complaining of right ear pain. On and off. Currently asymptomatic. No trouble hearing. Had been hearing test in the past showed the right was better than left. Currently no concern. No headache or dizziness. No recent cold cough. Denies any headache, dizziness, no chest pain or trouble breathing, no arm or leg weakness. No nausea or vomiting, no weight or appetite changes, no mood changes . No urine or bowel complains, no palpitation, no diaphoresis. No abdominal pain. No cold or cough. No leg swelling. No fever. No sleep trouble. Noted ER visit back in February for flank pain. CT scan done in the emergency room showed right uterine/ovarian lesion. Has seen [facility]. According to patient no lesion found by repeat ultrasound with them. Will obtain the records. Visit Vitals  /80 (BP 1 Location: Left arm, BP Patient Position: Sitting, BP Cuff Size: Adult)   Pulse 67   Temp 97.3 °F (36.3 °C) (Temporal)   Resp 16   Ht 5' 1\" (1.549 m)   Wt 160 lb (72.6 kg)   SpO2 98%   BMI 30.23 kg/m²     Review medication list, vitals, problem list,allergies.    Lab Results   Component Value Date/Time    WBC 8.2 02/13/2022 11:15 AM    HGB 10.3 (L) 02/13/2022 11:15 AM    HCT 30.5 (L) 02/13/2022 11:15 AM    PLATELET 131 67/79/8829 11:15 AM MCV 87.1 02/13/2022 11:15 AM     Lab Results   Component Value Date/Time    Sodium 137 02/13/2022 11:15 AM    Potassium 3.7 02/13/2022 11:15 AM    Chloride 104 02/13/2022 11:15 AM    CO2 28 02/13/2022 11:15 AM    Anion gap 5 02/13/2022 11:15 AM    Glucose 94 02/13/2022 11:15 AM    BUN 18 02/13/2022 11:15 AM    Creatinine 1.11 02/13/2022 11:15 AM    BUN/Creatinine ratio 16 02/13/2022 11:15 AM    GFR est AA >60 02/13/2022 11:15 AM    GFR est non-AA 51 (L) 02/13/2022 11:15 AM    Calcium 8.8 02/13/2022 11:15 AM    Bilirubin, total 0.5 12/27/2021 07:26 AM    Alk. phosphatase 94 12/27/2021 07:26 AM    Protein, total 7.6 12/27/2021 07:26 AM    Albumin 4.2 12/27/2021 07:26 AM    Globulin 3.4 12/27/2021 07:26 AM    A-G Ratio 1.2 12/27/2021 07:26 AM    ALT (SGPT) 27 12/27/2021 07:26 AM    AST (SGOT) 23 12/27/2021 07:26 AM     Lab Results   Component Value Date/Time    Cholesterol, total 140 12/27/2021 07:26 AM    HDL Cholesterol 49 12/27/2021 07:26 AM    LDL, calculated 66.2 12/27/2021 07:26 AM    VLDL, calculated 24.8 12/27/2021 07:26 AM    Triglyceride 124 12/27/2021 07:26 AM    CHOL/HDL Ratio 2.9 12/27/2021 07:26 AM     Lab Results   Component Value Date/Time    TSH 1.040 06/09/2014 12:00 AM     Lab Results   Component Value Date/Time    Hemoglobin A1c 6.2 (H) 12/27/2021 07:26 AM     Lab Results   Component Value Date/Time    Vitamin D 25-Hydroxy 40.6 05/24/2017 09:25 AM           ROS: See HPI  Physical Exam  Constitutional:       General: She is not in acute distress. HENT:      Right Ear: Tympanic membrane and ear canal normal.      Ears:      Comments: Noted mild cerumen impaction. Cardiovascular:      Rate and Rhythm: Normal rate and regular rhythm. Heart sounds: Normal heart sounds. Abdominal:      General: Bowel sounds are normal.      Palpations: Abdomen is soft. Tenderness: There is no abdominal tenderness. Neurological:      Mental Status: She is oriented to person, place, and time.    Psychiatric: Behavior: Behavior normal.         ASSESSMENT and PLAN    ICD-10-CM ICD-9-CM    1. NSTEMI (non-ST elevated myocardial infarction) [facility]): On risk factor management. Off Plavix. On aspirin I21.4 410.70    2. Prediabetes: Lifestyle and diet modification R73.03 790.29    3. Coronary artery disease involving native coronary artery of native heart without angina pectoris: No anginal symptoms. Continue risk factor management. I25.10 414.01    4. Vitamin D deficiency: On supplement E55.9 268.9    5. Earache on right: Mild cerumen present. No signs of infection. Observe and advised Debrox as needed H92.01 388.70    6. Chronic anemia: Has been following oncology. Recommended multivitamin. Discussed the recommendation with patient D64.9 285.9    7. Abnormal CT scan: CT scan done during the ER for flank pain and showed a right uterine/ovarian lesion. She has seen specialist at [facility] [place] and according to patient no lesion found. She is asymptomatic. No abdominal pain. No pelvic pain. No vagina discharge. We will try to obtain the records R93.89 793.99    Patient understood agree with the plan  Has taken COVID booster  Follow-up and Dispositions    · Return in about 6 months (around 9/28/2022). Please note that this dictation was completed with [employer], the [employer] voice recognition software. Quite often unanticipated grammatical, syntax, homophones, and other interpretive errors are inadvertently transcribed by the computer software. Please disregard these errors. Please excuse any errors that have escaped final proofreading.

## 2022-03-28 NOTE — PATIENT INSTRUCTIONS
Prediabetes: Care Instructions  Overview     Prediabetes is a warning sign that you're at risk for getting type 2 diabetes. It means that your blood sugar is higher than it should be. But it's not high enough to be diabetes. The food you eat naturally turns into sugar. Your body uses the sugar for energy. Normally, an organ called the pancreas makes insulin. And insulin allows the sugar in your blood to get into your body's cells. But sometimes the body can't use insulin the right way. So the sugar stays in your blood instead. This is called insulin resistance. The buildup of sugar in your blood means you have prediabetes. The good news is that you may be able to prevent or delay diabetes. Making small lifestyle changes, like getting active and changing your eating habits, may help you get your blood sugar back to normal. You can work with your doctor to make a treatment plan. Follow-up care is a key part of your treatment and safety. Be sure to make and go to all appointments, and call your doctor if you are having problems. It's also a good idea to know your test results and keep a list of the medicines you take. How can you care for yourself at home? · Watch your weight. A healthy weight helps your body use insulin properly. · Limit the amount of calories, sweets, and unhealthy fat you eat. Ask your doctor if you should see a dietitian. A registered dietitian can help you create meal plans that fit your lifestyle. · Get at least 30 minutes of exercise on most days of the week. Exercise helps control your blood sugar. It also helps you maintain a healthy weight. Walking is a good choice. You also may want to do other activities, such as running, swimming, cycling, or playing tennis or team sports. · Do not smoke. Smoking can make prediabetes worse. If you need help quitting, talk to your doctor about stop-smoking programs and medicines. These can increase your chances of quitting for good.   · If your doctor prescribed medicines, take them exactly as prescribed. Call your doctor if you think you are having a problem with your medicine. You will get more details on the specific medicines your doctor prescribes. When should you call for help? Watch closely for changes in your health, and be sure to contact your doctor if:    · You have any symptoms of diabetes. These may include:  ? Being thirsty more often. ? Urinating more. ? Being hungrier. ? Losing weight. ? Being very tired. ? Having blurry vision.     · You have a wound that will not heal.     · You have an infection that will not go away.     · You have problems with your blood pressure.     · You want more information about diabetes and how you can keep from getting it. Where can you learn more? Go to http://www.gray.com/  Enter I222 in the search box to learn more about \"Prediabetes: Care Instructions. \"  Current as of: July 28, 2021               Content Version: 13.2  © 2006-2022 [employer]. Care instructions adapted under license by [employer] (which disclaims liability or warranty for this information). If you have questions about a medical condition or this instruction, always ask your healthcare professional. [address] any warranty or liability for your use of this information. A Healthy Lifestyle: Care Instructions  Your Care Instructions     A healthy lifestyle can help you feel good, stay at a healthy weight, and have plenty of energy for both work and play. A healthy lifestyle is something you can share with your whole family. A healthy lifestyle also can lower your risk for serious health problems, such as high blood pressure, heart disease, and diabetes. You can follow a few steps listed below to improve your health and the health of your family. Follow-up care is a key part of your treatment and safety.  Be sure to make and go to all appointments, and call your doctor if you are having problems. It's also a good idea to know your test results and keep a list of the medicines you take. How can you care for yourself at home? · Do not eat too much sugar, fat, or fast foods. You can still have dessert and treats now and then. The goal is moderation. · Start small to improve your eating habits. Pay attention to portion sizes, drink less juice and soda pop, and eat more fruits and vegetables. ? Eat a healthy amount of food. A 3-ounce serving of meat, for example, is about the size of a deck of cards. Fill the rest of your plate with vegetables and whole grains. ? Limit the amount of soda and sports drinks you have every day. Drink more water when you are thirsty. ? Eat plenty of fruits and vegetables every day. Have an apple or some carrot sticks as an afternoon snack instead of a candy bar. Try to have fruits and/or vegetables at every meal.  · Make exercise part of your daily routine. You may want to start with simple activities, such as walking, bicycling, or slow swimming. Try to be active 30 to 60 minutes every day. You do not need to do all 30 to 60 minutes all at once. For example, you can exercise 3 times a day for 10 or 20 minutes. Moderate exercise is safe for most people, but it is always a good idea to talk to your doctor before starting an exercise program.  · Keep moving. Laneta Satchel the lawn, work in the garden, or [employer]. Take the stairs instead of the elevator at work. · If you smoke, quit. People who smoke have an increased risk for heart attack, stroke, cancer, and other lung illnesses. Quitting is hard, but there are ways to boost your chance of quitting tobacco for good. ? Use nicotine gum, patches, or lozenges. ? Ask your doctor about stop-smoking programs and medicines. ? Keep trying.   In addition to reducing your risk of diseases in the future, you will notice some benefits soon after you stop using tobacco. If you have shortness of breath or asthma symptoms, they will likely get better within a few weeks after you quit. · Limit how much alcohol you drink. Moderate amounts of alcohol (up to 2 drinks a day for men, 1 drink a day for women) are okay. But drinking too much can lead to liver problems, high blood pressure, and other health problems. Family health  If you have a family, there are many things you can do together to improve your health. · Eat meals together as a family as often as possible. · Eat healthy foods. This includes fruits, vegetables, lean meats and dairy, and whole grains. · Include your family in your fitness plan. Most people think of activities such as jogging or tennis as the way to fitness, but there are many ways you and your family can be more active. Anything that makes you breathe hard and gets your heart pumping is exercise. Here are some tips:  ? Walk to do errands or to take your child to school or the bus.  ? Go for a family bike ride after dinner instead of watching TV. Where can you learn more? Go to http://www.gray.com/  Enter I962 in the search box to learn more about \"A Healthy Lifestyle: Care Instructions. \"  Current as of: June 16, 2021               Content Version: 13.2  © 2006-2022 Health[employer], [employer]. Care instructions adapted under license by [employer] (which disclaims liability or warranty for this information). If you have questions about a medical condition or this instruction, always ask your healthcare professional. [place] [address] any warranty or liability for your use of this information. Low Sodium Diet (2,000 Milligram): Care Instructions  Overview     Limiting sodium can be an important part of managing some health problems. The most common source of sodium is salt. People get most of the salt in their diet from canned, prepared, and packaged foods. Fast food and restaurant meals also are very high in sodium. Your doctor will probably limit your sodium to less than 2,000 milligrams (mg) a day. This limit counts all the sodium in prepared and packaged foods and any salt you add to your food. Follow-up care is a key part of your treatment and safety. Be sure to make and go to all appointments, and call your doctor if you are having problems. It's also a good idea to know your test results and keep a list of the medicines you take. How can you care for yourself at home? Read food labels  · Read labels on cans and food packages. The labels tell you how much sodium is in each serving. Make sure that you look at the serving size. If you eat more than the serving size, you have eaten more sodium. · Food labels also tell you the Percent Daily Value for sodium. Choose products with low Percent Daily Values for sodium. · Be aware that sodium can come in forms other than salt, including monosodium glutamate (MSG), sodium citrate, and sodium bicarbonate (baking soda). MSG is often added to Asian food. When you eat out, you can sometimes ask for food without MSG or added salt. Buy low-sodium foods  · Buy foods that are labeled \"unsalted\" (no salt added), \"sodium-free\" (less than 5 mg of sodium per serving), or \"low-sodium\" (140 mg or less of sodium per serving). Foods labeled \"reduced-sodium\" and \"light sodium\" may still have too much sodium. Be sure to read the label to see how much sodium you are getting. · Buy fresh vegetables, or frozen vegetables without added sauces. Buy low-sodium versions of canned vegetables, soups, and other canned goods. Prepare low-sodium meals  · Cut back on the amount of salt you use in cooking. This will help you adjust to the taste. Do not add salt after cooking. One teaspoon of salt has about 2,300 mg of sodium. · Take the salt shaker off the table. · Flavor your food with garlic, lemon juice, onion, vinegar, herbs, and spices.  Do not use soy sauce, lite soy sauce, steak sauce, onion salt, garlic salt, celery salt, or ketchup on your food. · Use low-sodium salad dressings, sauces, and ketchup. Or make your own salad dressings and sauces without adding salt. · Use less salt (or none) when recipes call for it. You can often use half the salt a recipe calls for without losing flavor. Other foods such as rice, pasta, and grains do not need added salt. · Rinse canned vegetables, and cook them in fresh water. This removes somebut not allof the salt. · Avoid water that is naturally high in sodium or that has been treated with water softeners, which add sodium. If you buy bottled water, read the label and choose a sodium-free brand. Avoid high-sodium foods  · Avoid eating:  ? Smoked, cured, salted, and canned meat, fish, and poultry. ? Ham, ravi, hot dogs, and luncheon meats. ? Regular, hard, and processed cheese and regular peanut butter. ? Crackers with salted tops, and other salted snack foods such as pretzels, chips, and salted popcorn. ? Frozen prepared meals, unless labeled low-sodium. ? Canned and dried soups, broths, and bouillon, unless labeled sodium-free or low-sodium. ? Canned vegetables, unless labeled sodium-free or low-sodium. ? [place] fries, pizza, tacos, and other fast foods. ? Pickles, olives, ketchup, and other condiments, especially soy sauce, unless labeled sodium-free or low-sodium. Where can you learn more? Go to http://www.gray.com/  Enter V843 in the search box to learn more about \"Low Sodium Diet (2,000 Milligram): Care Instructions. \"  Current as of: September 8, 2021               Content Version: 13.2  © 8757-1068 Healthwise, [employer]. Care instructions adapted under license by [employer] (which disclaims liability or warranty for this information).  If you have questions about a medical condition or this instruction, always ask your healthcare professional. [place] [address] any warranty or liability for your use of this information.

## 2022-03-28 NOTE — PROGRESS NOTES
Chief Complaint   Patient presents with    Other     Pre-DM    Vitamin D Deficiency    Thyroid Problem    Coronary Artery Disease    Ear Pain       1. \"Have you been to the ER, urgent care clinic since your last visit? Hospitalized since your last visit? \" 2/13/22- Flank Pain    2. \"Have you seen or consulted any other health care providers outside of the [address] [place] [address] since your last visit? \" Tommy Johnson MD- OB/GYN     3. For patients aged 39-70: Has the patient had a colonoscopy / FIT/ Cologuard? Yes - no Care Gap present      If the patient is female:    4. For patients aged 41-77: Has the patient had a mammogram within the past 2 years? Yes - no Care Gap present      5. For patients aged 21-65: Has the patient had a pap smear?  Yes - no Care Gap present

## 2022-03-29 ENCOUNTER — TELEPHONE (OUTPATIENT)
Dept: FAMILY MEDICINE CLINIC | Age: 57
End: 2022-03-29

## 2022-03-31 NOTE — TELEPHONE ENCOUNTER
VIIS checked and patient received booster 12/27/21. Immunization record printed for scanning.  HM updated

## 2022-05-12 RX ORDER — TRAZODONE HYDROCHLORIDE 50 MG/1
TABLET ORAL
Qty: 90 TABLET | Refills: 1 | Status: SHIPPED | OUTPATIENT
Start: 2022-05-12

## 2022-07-06 ENCOUNTER — OFFICE VISIT (OUTPATIENT)
Dept: CARDIOLOGY CLINIC | Age: 57
End: 2022-07-06
Payer: OTHER GOVERNMENT

## 2022-07-06 VITALS
HEART RATE: 65 BPM | HEIGHT: 61 IN | BODY MASS INDEX: 29.45 KG/M2 | WEIGHT: 156 LBS | DIASTOLIC BLOOD PRESSURE: 74 MMHG | SYSTOLIC BLOOD PRESSURE: 118 MMHG | OXYGEN SATURATION: 97 %

## 2022-07-06 DIAGNOSIS — I25.42 SPONTANEOUS DISSECTION OF CORONARY ARTERY: Primary | ICD-10-CM

## 2022-07-06 DIAGNOSIS — I10 ESSENTIAL HYPERTENSION: ICD-10-CM

## 2022-07-06 PROCEDURE — 99214 OFFICE O/P EST MOD 30 MIN: CPT | Performed by: INTERNAL MEDICINE

## 2022-07-06 NOTE — PROGRESS NOTES
Raleigh Booth    f/u NSTEMI/ nonatherosclerotic MI  C/o CP x 4 months    HPI    Raleigh Booth is a 61 y.o.  perimenopausal woman with HTN, presented to 2018 ER c/o URI symptoms including CP- ended up NSTEMI, initial cath called \"normal coronaries. \" Due to continued pain, another cath few days later revealed an occluded mid/ distal OM2. Also FMD of right iliac artery noted. Her EF remained normal without any WMA. I reviewed her hospital records in detail, and have elaborated the findings:    · NSTEMI with troponin up to 7.66, initial ECG without new ischemic changes, presented with CP. Recent NSTEMI with peak troponin 13.4 with nonobstructive CAD by cath as noted below. · Nonobstructive CAD by cath (LM normal, LAD minimal luminal irregularities, left Cx normal, RCA normal) by Dr. George Schmidt 2018. Possible transient thrombus vs vasospasm. · Hypertension with elevated BP on admission. · Upper respiratory infection, recent cough and congestion with low grade fever 99.5 without leukocytosis. · Normal LV function 56-60% by echo 2018 without WMA 2018. · Mild to moderate tricuspid valve regurgitation with mild pulmonary hypertension PASP 43mmHg by echo 2018. She was discharged on medical therapy (included statin, BB, CCB, plavix). She was tried on Imdur but it gave her headaches. Since the hospitalization, she is significantly better but says almost every night she could feel some chest pressure. It goes away as soon as she takes her night medications. Otherwise, no LE swelling, no SOB. She has just started walking again but hasnt returned to work. She works in home care. She went for a second opinion at [facility]. Reviewed these records and see that patient was sent to cardiac rehab. Medications were adjusted which included stopping metoprolol and starting carvedilol and stopping nifedipine and starting chlorthalidone.   Her CP improved over the next several months, and she completed cardiac rehab. She never followed up with me as directed and shows up after 1.5 yrs. I asked her to stop her Plavix (and she didn't) but otherwise she was doing well and without CP at that time. She comes today, now saying for 4 months has been having CP with exertion. She was working 5-6 days a week, and with pushing/ moving patients it was too much. She has cut down to 2 days a week, and has noticed significant improvement in the CP. It goes away with rest and says doesn't last long enough to take a nitro. Past Medical History:   Diagnosis Date    Anemia     Hypertension     [facility]     NSTEMI (non-ST elevated myocardial infarction) ([facility] [address]     12/2018       Past Surgical History:   Procedure Laterality Date    HX APPENDECTOMY      HX HEART CATHETERIZATION         Current Outpatient Medications   Medication Sig Dispense Refill    traZODone (DESYREL) 50 mg tablet TAKE ONE TABLET BY MOUTH ONCE NIGHTLY 90 Tablet 1    albuterol (PROVENTIL HFA, VENTOLIN HFA, PROAIR HFA) 90 mcg/actuation inhaler Take 2 Puffs by inhalation every four (4) hours as needed for Wheezing. 1 Each 1    chlorthalidone (HYGROTON) 25 mg tablet Take 1 Tablet by mouth daily. 90 Tablet 3    carvediloL (COREG) 12.5 mg tablet Take 1 Tablet by mouth two (2) times a day. 180 Tablet 3    lisinopriL (PRINIVIL, ZESTRIL) 10 mg tablet Take 1 Tablet by mouth daily. 90 Tablet 3    atorvastatin (LIPITOR) 40 mg tablet Take 1 Tablet by mouth nightly. 90 Tablet 3    aspirin 81 mg chewable tablet Take 1 Tab by mouth daily. 90 Tab 3    nitroglycerin (NITROSTAT) 0.4 mg SL tablet 1 Tab by SubLINGual route every five (5) minutes as needed for Chest Pain. Up to 3 doses. 1 Bottle 1    Cholecalciferol, Vitamin D3, 1,000 unit cap Take 1,000 Units by mouth daily.          Allergies   Allergen Reactions    Norvasc [Amlodipine] Swelling     Ankle swelling        Social History     Socioeconomic History    Marital status:      Spouse name: Not on file    Number of children: Not on file    Years of education: Not on file    Highest education level: Not on file   Occupational History    Not on file   Tobacco Use    Smoking status: Never Smoker    Smokeless tobacco: Never Used   Vaping Use    Vaping Use: Never used   Substance and Sexual Activity    Alcohol use: No     Alcohol/week: 0.0 standard drinks    Drug use: No    Sexual activity: Yes     Partners: Male     Birth control/protection: Surgical     Comment: tubal ligation   Other Topics Concern    Not on file   Social History Narrative    Not on file     Social Determinants of Health     Financial Resource Strain:     Difficulty of Paying Living Expenses: Not on file   Food Insecurity:     Worried About Running Out of Food in the Last Year: Not on file    Mj of Food in the Last Year: Not on file   Transportation Needs:     Lack of Transportation (Medical): Not on file    Lack of Transportation (Non-Medical):  Not on file   Physical Activity:     Days of Exercise per Week: Not on file    Minutes of Exercise per Session: Not on file   Stress:     Feeling of Stress : Not on file   Social Connections:     Frequency of Communication with Friends and Family: Not on file    Frequency of Social Gatherings with Friends and Family: Not on file    Attends [employer] Services: Not on file    Active Member of [address] [place] [address] or Organizations: Not on file    Attends Club or Organization Meetings: Not on file    Marital Status: Not on file   Intimate Partner Violence:     Fear of Current or Ex-Partner: Not on file    Emotionally Abused: Not on file    Physically Abused: Not on file    Sexually Abused: Not on file   Housing Stability:     Unable to Pay for Housing in the Last Year: Not on file    Number of [place] in the Last Year: Not on file    Unstable Housing in the Last Year: Not on file    Home care    No significant FH of premature CAD or SCD.    Review of Systems    14 pt Review of Systems is negative unless otherwise mentioned in the HPI. Wt Readings from Last 3 Encounters:   07/06/22 70.8 kg (156 lb)   03/28/22 72.6 kg (160 lb)   02/13/22 73.5 kg (162 lb)     Temp Readings from Last 3 Encounters:   03/28/22 97.3 °F (36.3 °C) (Temporal)   02/13/22 97.5 °F (36.4 °C)   12/27/21 96.9 °F (36.1 °C) (Temporal)     BP Readings from Last 3 Encounters:   07/06/22 118/74   03/28/22 128/80   02/13/22 118/60     Pulse Readings from Last 3 Encounters:   07/06/22 65   03/28/22 67   02/13/22 62       12/11/18   ECHO ADULT FOLLOW-UP OR LIMITED 12/12/2018 12/12/2018    Narrative · Estimated left ventricular ejection fraction is 56 - 60%. Visually   measured ejection fraction. Left ventricular mild concentric hypertrophy. Normal left ventricular wall motion, no regional wall motion abnormality   noted. Signed by: Joaquina Vargas MD       Physical Exam:    Visit Vitals  /74 (BP 1 Location: Left upper arm, BP Patient Position: Sitting, BP Cuff Size: Small adult)   Pulse 65   Ht 5' 1\" (1.549 m)   Wt 70.8 kg (156 lb)   LMP 08/28/2017   SpO2 97%   BMI 29.48 kg/m²      Physical Exam  Constitutional:       General: She is not in acute distress. Appearance: She is well-developed. She is not diaphoretic. HENT:      Head: Normocephalic and atraumatic. Eyes:      Pupils: Pupils are equal, round, and reactive to light. Neck:      Vascular: No JVD. Cardiovascular:      Rate and Rhythm: Normal rate and regular rhythm. Heart sounds: Normal heart sounds. No murmur heard. No friction rub. No gallop. Pulmonary:      Effort: Pulmonary effort is normal. No respiratory distress. Breath sounds: Normal breath sounds. No wheezing or rales. Chest:      Chest wall: No tenderness. Abdominal:      General: Bowel sounds are normal.      Palpations: Abdomen is soft. Musculoskeletal:         General: No tenderness.    Skin:     General: Skin is warm and dry. Neurological:      Mental Status: She is alert and oriented to person, place, and time. Last EKG: NSR, normal axis and intervals, no ST segment abnormalities    Lab Results   Component Value Date/Time    Cholesterol, total 140 12/27/2021 07:26 AM    HDL Cholesterol 49 12/27/2021 07:26 AM    LDL, calculated 66.2 12/27/2021 07:26 AM    VLDL, calculated 24.8 12/27/2021 07:26 AM    Triglyceride 124 12/27/2021 07:26 AM    CHOL/HDL Ratio 2.9 12/27/2021 07:26 AM     Impression and Plan:  Nils Sinclair is a 61 y.o. with:    1.) S/p NSTEMI, likely due to nonatherosclerotic MI- SCAD +/- vasospastic angina (OM2) 2018  2.) Fibromuscular dysplasia noted iliac on cath  3.) HTN, avg SBP goal <140s, (not at goal but did not take any am meds yet)  4.) Dyslipidemia  5.) Normal LV function    1.) BB to control angina, while nitrate responsive  2.) Almost every change [place] recommended (Increasing Coreg, stopping thiazide) she will agree to at our appt but at follow up appt doesn't make the changes. She seems comfortable on her current regimen (instituted at [facility])  3.) RTC yearly, call sooner if needed    35 mins    Thank you for allowing me to participate in the care of your patient, please do not hesitate to call with questions or concerns. Follow-up and Dispositions    · Return in about 1 year (around 7/6/2023).      [address] [facility],    [facility] [place],

## 2022-07-06 NOTE — PROGRESS NOTES
Jocelin White presents today for   Chief Complaint   Patient presents with    Follow-up     6 month follow up       Jocelin White preferred language for health care discussion is english/other. Is someone accompanying this pt? no    Is the patient using any DME equipment during [address]? no    Depression Screening:  3 most recent PHQ Screens 7/6/2022   Little interest or pleasure in doing things Not at all   Feeling down, depressed, irritable, or hopeless Not at all   Total Score PHQ 2 0       Learning Assessment:  Learning Assessment 7/6/2022   PRIMARY LEARNER Patient   HIGHEST LEVEL OF EDUCATION - PRIMARY LEARNER  -   BARRIERS PRIMARY LEARNER -   [facility]    NEED -   LEARNER PREFERENCE PRIMARY DEMONSTRATION     -   LEARNING SPECIAL TOPICS -   ANSWERED BY patient   RELATIONSHIP SELF       Abuse Screening:  Abuse Screening Questionnaire 7/6/2022   Do you ever feel afraid of your partner? N   Are you in a relationship with someone who physically or mentally threatens you? N   Is it safe for you to go home? Y       Fall Risk  Fall Risk Assessment, last 12 mths 9/25/2020   Able to walk? Yes   Fall in past 12 months? No           Pt currently taking Anticoagulant therapy? no    Pt currently taking Antiplatelet therapy ? ASA 81 mg once a day      Coordination of Care:  1. Have you been to the ER, urgent care clinic since your last visit? Hospitalized since your last visit? no    2. Have you seen or consulted any other health care providers outside of the [address] [place] [address] since your last visit? Include any pap smears or colon screening.  no

## 2022-11-05 DIAGNOSIS — I10 ESSENTIAL HYPERTENSION: ICD-10-CM

## 2022-11-05 RX ORDER — ATORVASTATIN CALCIUM 40 MG/1
TABLET, FILM COATED ORAL
Qty: 90 TABLET | Refills: 3 | Status: SHIPPED | OUTPATIENT
Start: 2022-11-05

## 2022-11-05 RX ORDER — LISINOPRIL 10 MG/1
TABLET ORAL
Qty: 90 TABLET | Refills: 3 | Status: SHIPPED | OUTPATIENT
Start: 2022-11-05

## 2022-11-05 RX ORDER — CHLORTHALIDONE 25 MG/1
TABLET ORAL
Qty: 90 TABLET | Refills: 3 | Status: SHIPPED | OUTPATIENT
Start: 2022-11-05

## 2022-11-05 RX ORDER — CARVEDILOL 12.5 MG/1
TABLET ORAL
Qty: 180 TABLET | Refills: 3 | Status: SHIPPED | OUTPATIENT
Start: 2022-11-05

## 2022-11-17 NOTE — Clinical Note
Right groin and right radial clipped, and draped. Wet prep solution applied at: 251. Wet prep solution dried at: 254. Wet prep elapsed drying time: 3 mins. no

## 2023-01-18 ENCOUNTER — HOSPITAL ENCOUNTER (OUTPATIENT)
Dept: GENERAL RADIOLOGY | Age: 58
Discharge: HOME | End: 2023-01-18
Payer: OTHER GOVERNMENT

## 2023-01-18 ENCOUNTER — OFFICE VISIT (OUTPATIENT)
Dept: FAMILY MEDICINE CLINIC | Age: 58
End: 2023-01-18
Payer: OTHER GOVERNMENT

## 2023-01-18 VITALS
BODY MASS INDEX: 30.09 KG/M2 | DIASTOLIC BLOOD PRESSURE: 70 MMHG | HEART RATE: 67 BPM | TEMPERATURE: 97.4 F | RESPIRATION RATE: 16 BRPM | SYSTOLIC BLOOD PRESSURE: 102 MMHG | OXYGEN SATURATION: 97 % | WEIGHT: 159.4 LBS | HEIGHT: 61 IN

## 2023-01-18 DIAGNOSIS — R06.2 WHEEZING: ICD-10-CM

## 2023-01-18 DIAGNOSIS — R73.03 PREDIABETES: Primary | ICD-10-CM

## 2023-01-18 DIAGNOSIS — I10 PRIMARY HYPERTENSION: ICD-10-CM

## 2023-01-18 DIAGNOSIS — R06.02 SHORTNESS OF BREATH ON EXERTION: ICD-10-CM

## 2023-01-18 DIAGNOSIS — E55.9 VITAMIN D DEFICIENCY: ICD-10-CM

## 2023-01-18 DIAGNOSIS — D64.9 CHRONIC ANEMIA: ICD-10-CM

## 2023-01-18 DIAGNOSIS — I21.4 NSTEMI (NON-ST ELEVATED MYOCARDIAL INFARCTION) (HCC): ICD-10-CM

## 2023-01-18 DIAGNOSIS — R05.9 COUGH: ICD-10-CM

## 2023-01-18 DIAGNOSIS — E78.00 PURE HYPERCHOLESTEROLEMIA: ICD-10-CM

## 2023-01-18 LAB — HBA1C MFR BLD HPLC: 5.9 %

## 2023-01-18 PROCEDURE — 71046 X-RAY EXAM CHEST 2 VIEWS: CPT

## 2023-01-18 RX ORDER — ALBUTEROL SULFATE 90 UG/1
2 AEROSOL, METERED RESPIRATORY (INHALATION)
Qty: 1 EACH | Refills: 1 | Status: SHIPPED | OUTPATIENT
Start: 2023-01-18

## 2023-01-18 NOTE — PROGRESS NOTES
1. \"Have you been to the ER, urgent care clinic since your last visit? Hospitalized since your last visit? \" No    2. \"Have you seen or consulted any other health care providers outside of the [address] [place] [address] since your last visit? \" No     3. For patients aged 39-70: Has the patient had a colonoscopy / FIT/ Cologuard? Yes - no Care Gap present      If the patient is female:    4. For patients aged 41-77: Has the patient had a mammogram within the past 2 years? Yes - no Care Gap present      5. For patients aged 21-65: Has the patient had a pap smear?  Yes - no Care Gap present    Chief Complaint   Patient presents with    Blood sugar problem    Coronary Artery Disease    Vitamin D Deficiency    Anemia    Results     Abnormal CT Scan

## 2023-01-18 NOTE — PROGRESS NOTES
HISTORY OF PRESENT ILLNESS  Shar Newberry is a 61 y.o. female. HPI: Here for follow-up. History of prediabetes. A1c today 5.9. Advised to continue lifestyle and diet modification which she is working with compliance  Clinically concern for shortness of breath on exertion. Said 1 time she felt swelling of the legs. No cold cough sometimes wheezing. Using albuterol. No recent viral illness. History of non-ST elevation MI. On risk factor management. No new recommendations from cardiology. No chest pain or diaphoresis associated with shortness of breath. Improved with taking albuterol. Currently no other signs of volume overload. No leg swelling. Weight has been stable. No cold cough wheezing while sitting in the office. Able to talk in full sentence and not using any extra respiratory muscle. Does not smoke. Hypertension. Vitals been stable. Taking medication with compliance  Chronic anemia. On vitamin supplement. Will recheck labs. Denies any headache or dizziness. No chest pain. No cold cough or wheezing at this time. No urinary or bowel complaint. No mood changes. No depression or anxiety. No trouble swallowing or change in voice. Sleep is fair. Hyperlipidemia. On statin. No side effects  Visit Vitals  /70 (BP 1 Location: Left upper arm, BP Patient Position: Sitting, BP Cuff Size: Adult)   Pulse 67   Temp 97.4 °F (36.3 °C) (Temporal)   Resp 16   Ht 5' 1\" (1.549 m)   Wt 159 lb 6.4 oz (72.3 kg)   SpO2 97%   BMI 30.12 kg/m²     Review medication list, vitals, problem list,allergies.    Lab Results   Component Value Date/Time    WBC 8.2 02/13/2022 11:15 AM    HGB 10.3 (L) 02/13/2022 11:15 AM    HCT 30.5 (L) 02/13/2022 11:15 AM    PLATELET 900 26/05/0221 11:15 AM    MCV 87.1 02/13/2022 11:15 AM     Lab Results   Component Value Date/Time    Cholesterol, total 140 12/27/2021 07:26 AM    HDL Cholesterol 49 12/27/2021 07:26 AM    LDL, calculated 66.2 12/27/2021 07:26 AM Triglyceride 124 12/27/2021 07:26 AM    CHOL/HDL Ratio 2.9 12/27/2021 07:26 AM     Lab Results   Component Value Date/Time    TSH 1.040 06/09/2014 12:00 AM      Lab Results   Component Value Date/Time    Sodium 137 02/13/2022 11:15 AM    Potassium 3.7 02/13/2022 11:15 AM    Chloride 104 02/13/2022 11:15 AM    CO2 28 02/13/2022 11:15 AM    Anion gap 5 02/13/2022 11:15 AM    Glucose 94 02/13/2022 11:15 AM    BUN 18 02/13/2022 11:15 AM    Creatinine 1.11 02/13/2022 11:15 AM    BUN/Creatinine ratio 16 02/13/2022 11:15 AM    GFR est AA >60 02/13/2022 11:15 AM    GFR est non-AA 51 (L) 02/13/2022 11:15 AM    Calcium 8.8 02/13/2022 11:15 AM      Lab Results   Component Value Date/Time    Sodium 137 02/13/2022 11:15 AM    Potassium 3.7 02/13/2022 11:15 AM    Chloride 104 02/13/2022 11:15 AM    CO2 28 02/13/2022 11:15 AM    Anion gap 5 02/13/2022 11:15 AM    Glucose 94 02/13/2022 11:15 AM    BUN 18 02/13/2022 11:15 AM    Creatinine 1.11 02/13/2022 11:15 AM    BUN/Creatinine ratio 16 02/13/2022 11:15 AM    GFR est AA >60 02/13/2022 11:15 AM    GFR est non-AA 51 (L) 02/13/2022 11:15 AM    Calcium 8.8 02/13/2022 11:15 AM    Bilirubin, total 0.5 12/27/2021 07:26 AM    ALT (SGPT) 27 12/27/2021 07:26 AM    Alk. phosphatase 94 12/27/2021 07:26 AM    Protein, total 7.6 12/27/2021 07:26 AM    Albumin 4.2 12/27/2021 07:26 AM    Globulin 3.4 12/27/2021 07:26 AM    A-G Ratio 1.2 12/27/2021 07:26 AM      Lab Results   Component Value Date/Time    Hemoglobin A1c 6.2 (H) 12/27/2021 07:26 AM    Hemoglobin A1c (POC) 5.9 01/18/2023 09:36 AM       Lab Results   Component Value Date/Time    Iron 62 09/25/2020 09:59 AM    TIBC 265 09/25/2020 09:59 AM    Iron % saturation 23 09/25/2020 09:59 AM    Ferritin 44 03/15/2010 05:24 PM     Lab Results   Component Value Date/Time    Vitamin D 25-Hydroxy 40.6 05/24/2017 09:25 AM             ROS: see HPI     Physical Exam  Constitutional:       General: She is not in acute distress.   Cardiovascular: Rate and Rhythm: Normal rate and regular rhythm. Heart sounds: Normal heart sounds. Abdominal:      General: Bowel sounds are normal.      Palpations: Abdomen is soft. Tenderness: There is no abdominal tenderness. Musculoskeletal:         General: No swelling. Cervical back: Neck supple. Neurological:      Mental Status: She is oriented to person, place, and time. Psychiatric:         Behavior: Behavior normal.       ASSESSMENT and PLAN    ICD-10-CM ICD-9-CM    1. Prediabetes: A1c around 5.9. Discussed lifestyle and diet modification R73.03 790.29 AMB POC HEMOGLOBIN A1C      2. Cough: Albuterol as needed R05.9 786.2 albuterol (PROVENTIL HFA, VENTOLIN HFA, PROAIR HFA) 90 mcg/actuation inhaler      3. Wheezing  R06.2 786.07 albuterol (PROVENTIL HFA, VENTOLIN HFA, PROAIR HFA) 90 mcg/actuation inhaler      4. NSTEMI (non-ST elevated myocardial infarction) [facility]): On risk factor management. Currently having shortness of breath while climbing stairs. Checking x-ray and labs. Advised to make a follow-up appointment with cardiology I21.4 410.70       5. Vitamin D deficiency: On supplement E55.9 268.9       6. Primary hypertension:well controlled. Continue current dose of medication and low salt diet. Exercise as tolerated. I10 401.9       7. Chronic anemia: On vitamin supplement. We will recheck labs D64.9 285.9 CBC W/O DIFF      IRON PROFILE      8. Shortness of breath on exertion: Only while climbing stairs. Using albuterol helps. No signs of volume overload. No leg swelling or weight changes. Does not take salt in her diet. Advised to make an appointment with cardiology and will obtain chest x-ray R06.02 786.05 XR CHEST PA LAT      METABOLIC PANEL, COMPREHENSIVE      CBC W/O DIFF      9. Pure hypercholesterolemia: Continue statin.   Diet and lifestyle modification E78.00 272.0 LIPID PANEL      Patient understood agreed with the plan  Follow-up and Dispositions    Return in about 3 months (around 4/18/2023). Please note that this dictation was completed with [employer] ([employer]), the computer voice recognition software. Quite often unanticipated grammatical, syntax, homophones, and other interpretive errors are inadvertently transcribed by the computer software. Please disregard these errors. Please excuse any errors that have escaped final proofreading.

## 2023-02-20 ENCOUNTER — HOSPITAL ENCOUNTER (OUTPATIENT)
Facility: HOSPITAL | Age: 58
Discharge: HOME | End: 2023-02-23
Payer: OTHER GOVERNMENT

## 2023-02-20 LAB
ALBUMIN SERPL-MCNC: 3.8 G/DL (ref 3.4–5)
ALBUMIN/GLOB SERPL: 1.1 (ref 0.8–1.7)
ALP SERPL-CCNC: 90 U/L (ref 45–117)
ALT SERPL-CCNC: 26 U/L (ref 13–56)
ANION GAP SERPL CALC-SCNC: 4 MMOL/L (ref 3–18)
AST SERPL-CCNC: 18 U/L (ref 10–38)
BILIRUB SERPL-MCNC: 0.3 MG/DL (ref 0.2–1)
BUN SERPL-MCNC: 19 MG/DL (ref 7–18)
BUN/CREAT SERPL: 19 (ref 12–20)
CALCIUM SERPL-MCNC: 9.5 MG/DL (ref 8.5–10.1)
CHLORIDE SERPL-SCNC: 103 MMOL/L (ref 100–111)
CHOLEST SERPL-MCNC: 144 MG/DL
CO2 SERPL-SCNC: 31 MMOL/L (ref 21–32)
CREAT SERPL-MCNC: 0.98 MG/DL (ref 0.6–1.3)
ERYTHROCYTE [DISTWIDTH] IN BLOOD BY AUTOMATED COUNT: 13.3 % (ref 11.6–14.5)
GLOBULIN SER CALC-MCNC: 3.5 G/DL (ref 2–4)
GLUCOSE SERPL-MCNC: 102 MG/DL (ref 74–99)
HCT VFR BLD AUTO: 34.4 % (ref 35–45)
HDLC SERPL-MCNC: 49 MG/DL (ref 40–60)
HDLC SERPL: 2.9 (ref 0–5)
HGB BLD-MCNC: 11.3 G/DL (ref 12–16)
IRON SATN MFR SERPL: 28 % (ref 20–50)
IRON SERPL-MCNC: 73 UG/DL (ref 50–175)
LDLC SERPL CALC-MCNC: 76.4 MG/DL (ref 0–100)
LIPID PANEL: NORMAL
MCH RBC QN AUTO: 29.3 PG (ref 24–34)
MCHC RBC AUTO-ENTMCNC: 32.8 G/DL (ref 31–37)
MCV RBC AUTO: 89.1 FL (ref 78–100)
NRBC # BLD: 0 K/UL (ref 0–0.01)
NRBC BLD-RTO: 0 PER 100 WBC
PLATELET # BLD AUTO: 359 K/UL (ref 135–420)
PMV BLD AUTO: 10.9 FL (ref 9.2–11.8)
POTASSIUM SERPL-SCNC: 3.9 MMOL/L (ref 3.5–5.5)
PROT SERPL-MCNC: 7.3 G/DL (ref 6.4–8.2)
RBC # BLD AUTO: 3.86 M/UL (ref 4.2–5.3)
SODIUM SERPL-SCNC: 138 MMOL/L (ref 136–145)
TIBC SERPL-MCNC: 261 UG/DL (ref 250–450)
TRIGL SERPL-MCNC: 93 MG/DL
VLDLC SERPL CALC-MCNC: 18.6 MG/DL
WBC # BLD AUTO: 7.6 K/UL (ref 4.6–13.2)

## 2023-02-20 PROCEDURE — 36415 COLL VENOUS BLD VENIPUNCTURE: CPT

## 2023-02-20 PROCEDURE — 80053 COMPREHEN METABOLIC PANEL: CPT

## 2023-02-20 PROCEDURE — 85027 COMPLETE CBC AUTOMATED: CPT

## 2023-02-20 PROCEDURE — 83540 ASSAY OF IRON: CPT

## 2023-02-20 PROCEDURE — 80061 LIPID PANEL: CPT

## 2023-02-27 RX ORDER — ALBUTEROL SULFATE 90 UG/1
2 AEROSOL, METERED RESPIRATORY (INHALATION) EVERY 4 HOURS PRN
Qty: 18 G | Refills: 0 | Status: SHIPPED | OUTPATIENT
Start: 2023-02-27

## 2023-02-27 RX ORDER — TRAZODONE HYDROCHLORIDE 50 MG/1
TABLET ORAL
Qty: 30 TABLET | Refills: 0 | Status: SHIPPED | OUTPATIENT
Start: 2023-02-27

## 2023-02-27 NOTE — TELEPHONE ENCOUNTER
This patient contacted office for the following prescriptions to be filled:    Medication requested :   traZODone (DESYREL) 50 MG tablet  albuterol sulfate HFA (PROVENTIL;VENTOLIN;PROAIR) 108 (90 Base) MCG/ACT inhaler       PCP: [address] or Print: [facility]   Mail order or Local pharmacy [id number]     Scheduled appointment if not seen by current providers in office: LOV 1/18/2023 EDA 4/18/2023

## 2023-03-28 RX ORDER — TRAZODONE HYDROCHLORIDE 50 MG/1
TABLET ORAL
Qty: 30 TABLET | Refills: 4 | Status: SHIPPED | OUTPATIENT
Start: 2023-03-28

## 2023-03-28 NOTE — TELEPHONE ENCOUNTER
This pharmacy faxed over request for the following prescriptions to be filled:    Medication requested : traZODone (DESYREL) 50 MG tablet        QTY 30 REFILL 3  PCP: [id number] or Print: [facility]   Mail order or Local pharmacy [address]     Scheduled appointment if not seen by current providers in office: LOV 1/18/2023 FU 4/18/2023

## 2023-05-04 ENCOUNTER — HOSPITAL ENCOUNTER (EMERGENCY)
Facility: HOSPITAL | Age: 58
Discharge: HOME | End: 2023-05-04
Attending: EMERGENCY MEDICINE
Payer: OTHER GOVERNMENT

## 2023-05-04 VITALS
TEMPERATURE: 97.5 F | DIASTOLIC BLOOD PRESSURE: 60 MMHG | BODY MASS INDEX: 30.58 KG/M2 | HEART RATE: 67 BPM | HEIGHT: 61 IN | RESPIRATION RATE: 16 BRPM | OXYGEN SATURATION: 100 % | WEIGHT: 162 LBS | SYSTOLIC BLOOD PRESSURE: 104 MMHG

## 2023-05-04 DIAGNOSIS — B34.9 VIRAL ILLNESS: ICD-10-CM

## 2023-05-04 DIAGNOSIS — J00 ACUTE RHINITIS: Primary | ICD-10-CM

## 2023-05-04 LAB
FLUAV AG NPH QL IA: NEGATIVE
FLUBV AG NOSE QL IA: NEGATIVE
SARS-COV-2 RDRP RESP QL NAA+PROBE: NOT DETECTED
SOURCE: NORMAL

## 2023-05-04 PROCEDURE — 87635 SARS-COV-2 COVID-19 AMP PRB: CPT

## 2023-05-04 PROCEDURE — 87804 INFLUENZA ASSAY W/OPTIC: CPT

## 2023-05-04 PROCEDURE — 99283 EMERGENCY DEPT VISIT LOW MDM: CPT

## 2023-05-04 RX ORDER — GUAIFENESIN 600 MG/1
600 TABLET, EXTENDED RELEASE ORAL 2 TIMES DAILY
Qty: 20 TABLET | Refills: 0 | Status: SHIPPED | OUTPATIENT
Start: 2023-05-04 | End: 2023-05-14

## 2023-05-04 ASSESSMENT — ENCOUNTER SYMPTOMS
TROUBLE SWALLOWING: 0
VOMITING: 0
CONSTIPATION: 0
BLOOD IN STOOL: 0
SORE THROAT: 0
RHINORRHEA: 0
BACK PAIN: 0
SINUS PRESSURE: 0
DIARRHEA: 0
SINUS PAIN: 0
ABDOMINAL DISTENTION: 0
COUGH: 1
EYES NEGATIVE: 1
PHOTOPHOBIA: 0
ABDOMINAL PAIN: 0
ANAL BLEEDING: 0
SHORTNESS OF BREATH: 0
APNEA: 0
CHEST TIGHTNESS: 0
NAUSEA: 0
FACIAL SWELLING: 0

## 2023-05-04 ASSESSMENT — LIFESTYLE VARIABLES: HOW OFTEN DO YOU HAVE A DRINK CONTAINING ALCOHOL: NEVER

## 2023-05-04 ASSESSMENT — PAIN - FUNCTIONAL ASSESSMENT: PAIN_FUNCTIONAL_ASSESSMENT: NONE - DENIES PAIN

## 2023-05-04 NOTE — ED TRIAGE NOTES
Pt to ED for eval of sinus congestion and cough x 1 month. Denies fevers or other symptoms. No relief from OTC cold and allergy meds.

## 2023-05-04 NOTE — ED PROVIDER NOTES
Strict return precautions provided     Orders as below:  Orders Placed This Encounter   Procedures    COVID-19, Rapid    Rapid influenza A/B antigens          Care plan outlined and precautions discussed. Results were reviewed with the patient. All medications were reviewed with the patient. All of pt's questions and concerns were addressed. Alarm symptoms and return precautions associated with chief complaint and evaluation were reviewed with the patient in detail. The patient demonstrated adequate understanding. Patient was instructed to follow up with PCP, as well as given strict return precautions to the ED upon further deterioration. Patient is ready for discharge home. Diagnosis     Clinical Impression:   1. Acute rhinitis    2.  Viral illness        Disposition: home      [facility] EMERGENCY DEPT  [address] [address]  [phone]    If symptoms worsen    August MD Wilfred Blackmon [address] [place] [address]  [phone]    Schedule an appointment as soon as possible for a visit in 1 week  Follow up within 1 week, Return to ED sooner if symptoms worsen,           Medication List        START taking these medications      guaiFENesin 600 MG extended release tablet  Commonly known as: MUCINEX  Take 1 tablet by mouth 2 times daily for 10 days            ASK your doctor about these medications      albuterol sulfate  (90 Base) MCG/ACT inhaler  Commonly known as: PROVENTIL;VENTOLIN;PROAIR  Inhale 2 puffs into the lungs every 4 hours as needed for Wheezing     aspirin 81 MG chewable tablet     atorvastatin 40 MG tablet  Commonly known as: LIPITOR     carvedilol 12.5 MG tablet  Commonly known as: COREG     chlorthalidone 25 MG tablet  Commonly known as: HYGROTON     lisinopril 10 MG tablet  Commonly known as: PRINIVIL;ZESTRIL     nitroGLYCERIN 0.4 MG SL tablet  Commonly known as: NITROSTAT     traZODone 50 MG tablet  Commonly known as: DESYREL  TAKE ONE

## 2023-05-07 ENCOUNTER — HOSPITAL ENCOUNTER (EMERGENCY)
Facility: HOSPITAL | Age: 58
Discharge: HOME | End: 2023-05-07
Attending: EMERGENCY MEDICINE
Payer: OTHER GOVERNMENT

## 2023-05-07 VITALS
SYSTOLIC BLOOD PRESSURE: 107 MMHG | RESPIRATION RATE: 17 BRPM | HEIGHT: 61 IN | WEIGHT: 162 LBS | BODY MASS INDEX: 30.58 KG/M2 | TEMPERATURE: 97.3 F | HEART RATE: 65 BPM | DIASTOLIC BLOOD PRESSURE: 54 MMHG | OXYGEN SATURATION: 100 %

## 2023-05-07 DIAGNOSIS — R09.81 NASAL CONGESTION: Primary | ICD-10-CM

## 2023-05-07 PROCEDURE — 99282 EMERGENCY DEPT VISIT SF MDM: CPT

## 2023-05-07 ASSESSMENT — PAIN - FUNCTIONAL ASSESSMENT: PAIN_FUNCTIONAL_ASSESSMENT: NONE - DENIES PAIN

## 2023-05-08 NOTE — ED NOTES
Discharge instructions reviewed with patient. Patient verbalized understanding. Patient advised to follow up as directed on discharge instructions. Patient denies questions, needs or concerns at this time. Patient verbalized understanding. No s/sx of distress noted.        Karis Kenny RN  05/07/23 [id number]

## 2023-05-08 NOTE — ED PROVIDER NOTES
mis-transcribed.)    Elena Shelton MD (electronically signed)  Attending Emergency Physician          July Pollack MD  05/08/23 [id number]

## 2023-05-08 NOTE — ED TRIAGE NOTES
C/o continued nasal congestion; seen here on Thursday for same; was instructed to take Mucinex, which she states she has done with no relief. Pt c/o \"not able to sleep because I'm so congested. \"

## 2023-05-15 ENCOUNTER — HOSPITAL ENCOUNTER (OUTPATIENT)
Facility: HOSPITAL | Age: 58
Discharge: HOME | End: 2023-05-18
Payer: COMMERCIAL

## 2023-05-15 DIAGNOSIS — Z12.31 VISIT FOR SCREENING MAMMOGRAM: ICD-10-CM

## 2023-05-15 PROCEDURE — 77067 SCR MAMMO BI INCL CAD: CPT

## 2023-05-25 ENCOUNTER — HOSPITAL ENCOUNTER (EMERGENCY)
Facility: HOSPITAL | Age: 58
Discharge: HOME | End: 2023-05-25
Attending: EMERGENCY MEDICINE
Payer: COMMERCIAL

## 2023-05-25 VITALS
DIASTOLIC BLOOD PRESSURE: 46 MMHG | BODY MASS INDEX: 30.58 KG/M2 | TEMPERATURE: 97.9 F | WEIGHT: 162 LBS | SYSTOLIC BLOOD PRESSURE: 111 MMHG | HEART RATE: 66 BPM | HEIGHT: 61 IN | OXYGEN SATURATION: 100 %

## 2023-05-25 DIAGNOSIS — B96.89 BACTERIAL VAGINOSIS: ICD-10-CM

## 2023-05-25 DIAGNOSIS — N76.0 BACTERIAL VAGINOSIS: ICD-10-CM

## 2023-05-25 DIAGNOSIS — T14.8XXA MUSCLE STRAIN: Primary | ICD-10-CM

## 2023-05-25 LAB
APPEARANCE UR: CLEAR
BILIRUB UR QL: NEGATIVE
C TRACH RRNA SPEC QL NAA+PROBE: NEGATIVE
COLOR UR: YELLOW
GLUCOSE UR STRIP.AUTO-MCNC: NEGATIVE MG/DL
HCG UR QL: NEGATIVE
HGB UR QL STRIP: NEGATIVE
KETONES UR QL STRIP.AUTO: NEGATIVE MG/DL
LEUKOCYTE ESTERASE UR QL STRIP.AUTO: NEGATIVE
N GONORRHOEA RRNA SPEC QL NAA+PROBE: NEGATIVE
NITRITE UR QL STRIP.AUTO: NEGATIVE
PH UR STRIP: 5.5 (ref 5–8)
PROT UR STRIP-MCNC: NEGATIVE MG/DL
SERVICE CMNT-IMP: NORMAL
SP GR UR REFRACTOMETRY: 1.01 (ref 1–1.03)
SPECIMEN SOURCE: NORMAL
UROBILINOGEN UR QL STRIP.AUTO: 1 EU/DL (ref 0.2–1)
WET PREP GENITAL: NORMAL

## 2023-05-25 PROCEDURE — 81025 URINE PREGNANCY TEST: CPT

## 2023-05-25 PROCEDURE — 81003 URINALYSIS AUTO W/O SCOPE: CPT

## 2023-05-25 PROCEDURE — 87591 N.GONORRHOEAE DNA AMP PROB: CPT

## 2023-05-25 PROCEDURE — 87210 SMEAR WET MOUNT SALINE/INK: CPT

## 2023-05-25 PROCEDURE — 99283 EMERGENCY DEPT VISIT LOW MDM: CPT

## 2023-05-25 PROCEDURE — 87491 CHLMYD TRACH DNA AMP PROBE: CPT

## 2023-05-25 RX ORDER — METRONIDAZOLE 500 MG/1
500 TABLET ORAL 3 TIMES DAILY
Qty: 21 TABLET | Refills: 0 | Status: SHIPPED | OUTPATIENT
Start: 2023-05-25 | End: 2023-06-01

## 2023-05-25 ASSESSMENT — PAIN - FUNCTIONAL ASSESSMENT: PAIN_FUNCTIONAL_ASSESSMENT: 0-10

## 2023-05-25 ASSESSMENT — PAIN DESCRIPTION - PAIN TYPE: TYPE: ACUTE PAIN

## 2023-05-25 ASSESSMENT — LIFESTYLE VARIABLES
HOW MANY STANDARD DRINKS CONTAINING ALCOHOL DO YOU HAVE ON A TYPICAL DAY: PATIENT DOES NOT DRINK
HOW OFTEN DO YOU HAVE A DRINK CONTAINING ALCOHOL: NEVER

## 2023-05-25 ASSESSMENT — PAIN SCALES - GENERAL: PAINLEVEL_OUTOF10: 5

## 2023-05-25 ASSESSMENT — PAIN DESCRIPTION - FREQUENCY: FREQUENCY: INTERMITTENT

## 2023-05-25 NOTE — ED PROVIDER NOTES
EMERGENCY DEPARTMENT HISTORY AND PHYSICAL EXAM    9:24 AM EDT patient seen at this time in room QB        Date: 5/25/2023  Patient Name: Angel Kent    History of Presenting Illness     Chief Complaint   Patient presents with    Side Pain    Exposure to STD     Has requested STD check          History Provided By: patient    Additional History (Context): Angel Kent is a 61 y.o. female presents with was at work walking up the stairs had very sudden onset of right flank pain. She did not stumble or fall she was not carrying anything to started suddenly. Worse with movement. No urinary symptoms  She is concerned for STD because of any sexual partner she is asymptomatic. No vaginal bleeding or discharge no dysuria or rash. No suprapubic or generalized abdominal pain. PCP: Anat Cervantes MD    Chief Complaint:   Duration:    Timing:    Location:   Quality:   Severity:   Modifying Factors:   Associated Symptoms:       No current facility-administered medications for this encounter.      Current Outpatient Medications   Medication Sig Dispense Refill    metroNIDAZOLE (FLAGYL) 500 MG tablet Take 1 tablet by mouth 3 times daily for 7 days 21 tablet 0    traZODone (DESYREL) 50 MG tablet TAKE ONE TABLET BY MOUTH ONCE NIGHTLY 30 tablet 4    albuterol sulfate HFA (PROVENTIL;VENTOLIN;PROAIR) 108 (90 Base) MCG/ACT inhaler Inhale 2 puffs into the lungs every 4 hours as needed for Wheezing 18 g 0    aspirin 81 MG chewable tablet Take 81 mg by mouth daily      atorvastatin (LIPITOR) 40 MG tablet TAKE ONE TABLET BY MOUTH ONCE NIGHTLY      carvedilol (COREG) 12.5 MG tablet TAKE ONE TABLET BY MOUTH TWICE A DAY      chlorthalidone (HYGROTON) 25 MG tablet TAKE ONE TABLET BY MOUTH DAILY      vitamin D 25 MCG (1000 UT) CAPS Take 1,000 Units by mouth daily      lisinopril (PRINIVIL;ZESTRIL) 10 MG tablet TAKE ONE TABLET BY MOUTH DAILY      nitroGLYCERIN (NITROSTAT) 0.4 MG SL tablet Place 0.4 mg under the tongue         Past

## 2023-05-25 NOTE — ED TRIAGE NOTES
Right sided pain, this morning around 0230. Noted increased pain after using stairs at work. Rates pain 5/10, achy and constant. Has history of having appendix removed >20years ago.

## 2023-07-05 ENCOUNTER — OFFICE VISIT (OUTPATIENT)
Age: 58
End: 2023-07-05
Payer: OTHER GOVERNMENT

## 2023-07-05 VITALS
DIASTOLIC BLOOD PRESSURE: 72 MMHG | WEIGHT: 160 LBS | HEIGHT: 61 IN | OXYGEN SATURATION: 97 % | SYSTOLIC BLOOD PRESSURE: 100 MMHG | HEART RATE: 59 BPM | BODY MASS INDEX: 30.21 KG/M2

## 2023-07-05 DIAGNOSIS — I10 ESSENTIAL (PRIMARY) HYPERTENSION: ICD-10-CM

## 2023-07-05 DIAGNOSIS — I25.42 CORONARY ARTERY DISSECTION: Primary | ICD-10-CM

## 2023-07-05 PROCEDURE — 93000 ELECTROCARDIOGRAM COMPLETE: CPT | Performed by: INTERNAL MEDICINE

## 2023-07-05 PROCEDURE — 3078F DIAST BP <80 MM HG: CPT | Performed by: INTERNAL MEDICINE

## 2023-07-05 PROCEDURE — 99214 OFFICE O/P EST MOD 30 MIN: CPT | Performed by: INTERNAL MEDICINE

## 2023-07-05 PROCEDURE — 3074F SYST BP LT 130 MM HG: CPT | Performed by: INTERNAL MEDICINE

## 2023-07-05 ASSESSMENT — PATIENT HEALTH QUESTIONNAIRE - PHQ9
2. FEELING DOWN, DEPRESSED OR HOPELESS: 0
1. LITTLE INTEREST OR PLEASURE IN DOING THINGS: 0
SUM OF ALL RESPONSES TO PHQ9 QUESTIONS 1 & 2: 0
SUM OF ALL RESPONSES TO PHQ QUESTIONS 1-9: 0

## 2023-08-24 ENCOUNTER — TELEPHONE (OUTPATIENT)
Age: 58
End: 2023-08-24

## 2023-08-24 NOTE — TELEPHONE ENCOUNTER
Left a voice message on patient's mobile phone asking for return call to [facility], [phone] press option # 3 for . Ask to speak with Catie.

## 2023-08-24 NOTE — ROUTINE PROCESS
Bedside and Verbal shift change report given to Kristy PALOMO (oncoming nurse) by Magdi Gregorio (offgoing nurse). Report included the following information SBAR, Kardex, MAR, Recent Results and Cardiac Rhythm SR. Patient quietly resting, encouraged to call for assistance. Yes

## 2023-08-24 NOTE — TELEPHONE ENCOUNTER
Pt returned call to [facility] and was advised that it is up to the transplant center to evaluate her, Dr. Monroe Score can not put recommendations for that. Pt voiced understanding. Closing encounter.

## 2023-09-11 ENCOUNTER — OFFICE VISIT (OUTPATIENT)
Age: 58
End: 2023-09-11
Payer: OTHER GOVERNMENT

## 2023-09-11 VITALS
DIASTOLIC BLOOD PRESSURE: 80 MMHG | WEIGHT: 158.6 LBS | SYSTOLIC BLOOD PRESSURE: 104 MMHG | HEIGHT: 61 IN | BODY MASS INDEX: 29.94 KG/M2 | TEMPERATURE: 96.9 F | OXYGEN SATURATION: 97 % | RESPIRATION RATE: 16 BRPM | HEART RATE: 68 BPM

## 2023-09-11 DIAGNOSIS — E78.5 DYSLIPIDEMIA: ICD-10-CM

## 2023-09-11 DIAGNOSIS — I25.10 CORONARY ARTERY DISEASE INVOLVING NATIVE CORONARY ARTERY OF NATIVE HEART WITHOUT ANGINA PECTORIS: Primary | ICD-10-CM

## 2023-09-11 DIAGNOSIS — D64.9 CHRONIC ANEMIA: ICD-10-CM

## 2023-09-11 DIAGNOSIS — E55.9 VITAMIN D DEFICIENCY: ICD-10-CM

## 2023-09-11 DIAGNOSIS — G47.9 SLEEP TROUBLE: ICD-10-CM

## 2023-09-11 DIAGNOSIS — Z11.59 NEED FOR HEPATITIS C SCREENING TEST: ICD-10-CM

## 2023-09-11 DIAGNOSIS — R73.01 IMPAIRED FASTING BLOOD SUGAR: ICD-10-CM

## 2023-09-11 DIAGNOSIS — Z11.4 SCREENING FOR HIV (HUMAN IMMUNODEFICIENCY VIRUS): ICD-10-CM

## 2023-09-11 PROCEDURE — 99211 OFF/OP EST MAY X REQ PHY/QHP: CPT

## 2023-09-11 PROCEDURE — 99213 OFFICE O/P EST LOW 20 MIN: CPT | Performed by: FAMILY MEDICINE

## 2023-09-11 PROCEDURE — 3074F SYST BP LT 130 MM HG: CPT | Performed by: FAMILY MEDICINE

## 2023-09-11 PROCEDURE — 3079F DIAST BP 80-89 MM HG: CPT | Performed by: FAMILY MEDICINE

## 2023-09-11 RX ORDER — ELECTROLYTES/DEXTROSE
SOLUTION, ORAL ORAL
COMMUNITY

## 2023-09-11 RX ORDER — TRAZODONE HYDROCHLORIDE 50 MG/1
TABLET ORAL
Qty: 90 TABLET | Refills: 1 | Status: SHIPPED | OUTPATIENT
Start: 2023-09-11

## 2023-09-11 SDOH — ECONOMIC STABILITY: FOOD INSECURITY: WITHIN THE PAST 12 MONTHS, THE FOOD YOU BOUGHT JUST DIDN'T LAST AND YOU DIDN'T HAVE MONEY TO GET MORE.: NEVER TRUE

## 2023-09-11 SDOH — ECONOMIC STABILITY: HOUSING INSECURITY
IN THE LAST 12 MONTHS, WAS THERE A TIME WHEN YOU DID NOT HAVE A STEADY PLACE TO SLEEP OR SLEPT IN A SHELTER (INCLUDING NOW)?: NO

## 2023-09-11 SDOH — ECONOMIC STABILITY: INCOME INSECURITY: HOW HARD IS IT FOR YOU TO PAY FOR THE VERY BASICS LIKE FOOD, HOUSING, MEDICAL CARE, AND HEATING?: NOT HARD AT ALL

## 2023-09-11 SDOH — ECONOMIC STABILITY: FOOD INSECURITY: WITHIN THE PAST 12 MONTHS, YOU WORRIED THAT YOUR FOOD WOULD RUN OUT BEFORE YOU GOT MONEY TO BUY MORE.: NEVER TRUE

## 2023-09-11 ASSESSMENT — PATIENT HEALTH QUESTIONNAIRE - PHQ9
1. LITTLE INTEREST OR PLEASURE IN DOING THINGS: 0
SUM OF ALL RESPONSES TO PHQ QUESTIONS 1-9: 0
2. FEELING DOWN, DEPRESSED OR HOPELESS: 0
SUM OF ALL RESPONSES TO PHQ9 QUESTIONS 1 & 2: 0
SUM OF ALL RESPONSES TO PHQ QUESTIONS 1-9: 0

## 2023-09-18 ENCOUNTER — HOSPITAL ENCOUNTER (OUTPATIENT)
Facility: HOSPITAL | Age: 58
Discharge: HOME | End: 2023-09-21
Payer: OTHER GOVERNMENT

## 2023-09-18 DIAGNOSIS — D64.9 CHRONIC ANEMIA: ICD-10-CM

## 2023-09-18 DIAGNOSIS — Z11.59 NEED FOR HEPATITIS C SCREENING TEST: ICD-10-CM

## 2023-09-18 DIAGNOSIS — Z11.4 SCREENING FOR HIV (HUMAN IMMUNODEFICIENCY VIRUS): ICD-10-CM

## 2023-09-18 DIAGNOSIS — R73.01 IMPAIRED FASTING BLOOD SUGAR: ICD-10-CM

## 2023-09-18 DIAGNOSIS — E78.5 DYSLIPIDEMIA: ICD-10-CM

## 2023-09-18 DIAGNOSIS — E55.9 VITAMIN D DEFICIENCY: ICD-10-CM

## 2023-09-18 LAB
25(OH)D3 SERPL-MCNC: 47.2 NG/ML (ref 30–100)
ALBUMIN SERPL-MCNC: 3.8 G/DL (ref 3.4–5)
ALBUMIN/GLOB SERPL: 1 (ref 0.8–1.7)
ALP SERPL-CCNC: 92 U/L (ref 45–117)
ALT SERPL-CCNC: 28 U/L (ref 13–56)
ANION GAP SERPL CALC-SCNC: 5 MMOL/L (ref 3–18)
AST SERPL-CCNC: 20 U/L (ref 10–38)
BASOPHILS # BLD: 0 K/UL (ref 0–0.1)
BASOPHILS NFR BLD: 0 % (ref 0–2)
BILIRUB SERPL-MCNC: 0.5 MG/DL (ref 0.2–1)
BUN SERPL-MCNC: 20 MG/DL (ref 7–18)
BUN/CREAT SERPL: 17 (ref 12–20)
CALCIUM SERPL-MCNC: 9.2 MG/DL (ref 8.5–10.1)
CHLORIDE SERPL-SCNC: 104 MMOL/L (ref 100–111)
CHOLEST SERPL-MCNC: 153 MG/DL
CO2 SERPL-SCNC: 29 MMOL/L (ref 21–32)
CREAT SERPL-MCNC: 1.18 MG/DL (ref 0.6–1.3)
DIFFERENTIAL METHOD BLD: ABNORMAL
EOSINOPHIL # BLD: 0.3 K/UL (ref 0–0.4)
EOSINOPHIL NFR BLD: 4 % (ref 0–5)
ERYTHROCYTE [DISTWIDTH] IN BLOOD BY AUTOMATED COUNT: 13.8 % (ref 11.6–14.5)
EST. AVERAGE GLUCOSE BLD GHB EST-MCNC: 126 MG/DL
GLOBULIN SER CALC-MCNC: 3.7 G/DL (ref 2–4)
GLUCOSE SERPL-MCNC: 109 MG/DL (ref 74–99)
HBA1C MFR BLD: 6 % (ref 4.2–5.6)
HCT VFR BLD AUTO: 33.9 % (ref 35–45)
HCV AB SER IA-ACNC: 0.08 INDEX
HCV AB SERPL QL IA: NEGATIVE
HDLC SERPL-MCNC: 49 MG/DL (ref 40–60)
HDLC SERPL: 3.1 (ref 0–5)
HEPATITIS C COMMENT: NORMAL
HGB BLD-MCNC: 11 G/DL (ref 12–16)
IMM GRANULOCYTES # BLD AUTO: 0 K/UL (ref 0–0.04)
IMM GRANULOCYTES NFR BLD AUTO: 0 % (ref 0–0.5)
LDLC SERPL CALC-MCNC: 88 MG/DL (ref 0–100)
LIPID PANEL: NORMAL
LYMPHOCYTES # BLD: 3 K/UL (ref 0.9–3.6)
LYMPHOCYTES NFR BLD: 37 % (ref 21–52)
MCH RBC QN AUTO: 29.3 PG (ref 24–34)
MCHC RBC AUTO-ENTMCNC: 32.4 G/DL (ref 31–37)
MCV RBC AUTO: 90.4 FL (ref 78–100)
MONOCYTES # BLD: 0.5 K/UL (ref 0.05–1.2)
MONOCYTES NFR BLD: 7 % (ref 3–10)
NEUTS SEG # BLD: 4.1 K/UL (ref 1.8–8)
NEUTS SEG NFR BLD: 52 % (ref 40–73)
NRBC # BLD: 0 K/UL (ref 0–0.01)
NRBC BLD-RTO: 0 PER 100 WBC
PLATELET # BLD AUTO: 328 K/UL (ref 135–420)
PMV BLD AUTO: 11.4 FL (ref 9.2–11.8)
POTASSIUM SERPL-SCNC: 3.8 MMOL/L (ref 3.5–5.5)
PROT SERPL-MCNC: 7.5 G/DL (ref 6.4–8.2)
RBC # BLD AUTO: 3.75 M/UL (ref 4.2–5.3)
SODIUM SERPL-SCNC: 138 MMOL/L (ref 136–145)
TRIGL SERPL-MCNC: 80 MG/DL
VLDLC SERPL CALC-MCNC: 16 MG/DL
WBC # BLD AUTO: 8 K/UL (ref 4.6–13.2)

## 2023-09-18 PROCEDURE — 86803 HEPATITIS C AB TEST: CPT

## 2023-09-18 PROCEDURE — 87389 HIV-1 AG W/HIV-1&-2 AB AG IA: CPT

## 2023-09-18 PROCEDURE — 80061 LIPID PANEL: CPT

## 2023-09-18 PROCEDURE — 85025 COMPLETE CBC W/AUTO DIFF WBC: CPT

## 2023-09-18 PROCEDURE — 83036 HEMOGLOBIN GLYCOSYLATED A1C: CPT

## 2023-09-18 PROCEDURE — 82306 VITAMIN D 25 HYDROXY: CPT

## 2023-09-18 PROCEDURE — 36415 COLL VENOUS BLD VENIPUNCTURE: CPT

## 2023-09-18 PROCEDURE — 80053 COMPREHEN METABOLIC PANEL: CPT

## 2023-09-19 ENCOUNTER — HOSPITAL ENCOUNTER (EMERGENCY)
Facility: HOSPITAL | Age: 58
Discharge: HOME | End: 2023-09-19
Attending: EMERGENCY MEDICINE
Payer: OTHER GOVERNMENT

## 2023-09-19 VITALS
DIASTOLIC BLOOD PRESSURE: 69 MMHG | BODY MASS INDEX: 30.21 KG/M2 | SYSTOLIC BLOOD PRESSURE: 106 MMHG | HEIGHT: 61 IN | TEMPERATURE: 98.8 F | OXYGEN SATURATION: 100 % | WEIGHT: 160 LBS | HEART RATE: 63 BPM | RESPIRATION RATE: 17 BRPM

## 2023-09-19 DIAGNOSIS — J06.9 VIRAL UPPER RESPIRATORY TRACT INFECTION: Primary | ICD-10-CM

## 2023-09-19 LAB
DEPRECATED S PYO AG THROAT QL EIA: NEGATIVE
FLUAV AG NPH QL IA: NEGATIVE
FLUBV AG NOSE QL IA: NEGATIVE
HIV 1+2 AB+HIV1 P24 AG SERPL QL IA: NONREACTIVE
HIV 1/2 RESULT COMMENT: NORMAL
SARS-COV-2 RDRP RESP QL NAA+PROBE: NOT DETECTED
SOURCE: NORMAL

## 2023-09-19 PROCEDURE — 99283 EMERGENCY DEPT VISIT LOW MDM: CPT

## 2023-09-19 PROCEDURE — 6370000000 HC RX 637 (ALT 250 FOR IP): Performed by: EMERGENCY MEDICINE

## 2023-09-19 PROCEDURE — 87070 CULTURE OTHR SPECIMN AEROBIC: CPT

## 2023-09-19 PROCEDURE — 87880 STREP A ASSAY W/OPTIC: CPT

## 2023-09-19 PROCEDURE — 87635 SARS-COV-2 COVID-19 AMP PRB: CPT

## 2023-09-19 PROCEDURE — 87804 INFLUENZA ASSAY W/OPTIC: CPT

## 2023-09-19 RX ORDER — OXYMETAZOLINE HYDROCHLORIDE 0.05 G/100ML
2 SPRAY NASAL
Status: COMPLETED | OUTPATIENT
Start: 2023-09-19 | End: 2023-09-19

## 2023-09-19 RX ORDER — GUAIFENESIN AND DEXTROMETHORPHAN HYDROBROMIDE 600; 30 MG/1; MG/1
1 TABLET, EXTENDED RELEASE ORAL 2 TIMES DAILY PRN
Qty: 28 TABLET | Refills: 0 | Status: SHIPPED | OUTPATIENT
Start: 2023-09-19

## 2023-09-19 RX ORDER — BENZONATATE 100 MG/1
100 CAPSULE ORAL 3 TIMES DAILY PRN
Qty: 21 CAPSULE | Refills: 0 | Status: SHIPPED | OUTPATIENT
Start: 2023-09-19 | End: 2023-09-26

## 2023-09-19 RX ORDER — BENZONATATE 100 MG/1
100 CAPSULE ORAL
Status: COMPLETED | OUTPATIENT
Start: 2023-09-19 | End: 2023-09-19

## 2023-09-19 RX ORDER — PSEUDOEPHEDRINE HCL 120 MG/1
120 TABLET, FILM COATED, EXTENDED RELEASE ORAL EVERY 12 HOURS
Qty: 28 TABLET | Refills: 0 | Status: SHIPPED | OUTPATIENT
Start: 2023-09-19 | End: 2023-09-26

## 2023-09-19 RX ADMIN — OXYMETAZOLINE HCL 2 SPRAY: 0.05 SPRAY NASAL at 22:12

## 2023-09-19 RX ADMIN — BENZONATATE 100 MG: 100 CAPSULE ORAL at 22:08

## 2023-09-19 ASSESSMENT — PAIN SCALES - GENERAL: PAINLEVEL_OUTOF10: 8

## 2023-09-19 ASSESSMENT — PAIN - FUNCTIONAL ASSESSMENT: PAIN_FUNCTIONAL_ASSESSMENT: 0-10

## 2023-09-19 NOTE — ED TRIAGE NOTES
Pt ambulatory to triage c/o nasal congestion, non-productive cough, sore throat x 1 day.  Pt reports taking mucinex last dose 0830

## 2023-09-20 NOTE — ED NOTES
Discharge instructions reviewed with patient. Patient verbalized understanding. Patient advised to follow up as directed on discharge instructions. Patient denies questions, needs or concerns at this time. Patient verbalized understanding. No s/sx of distress noted.        Ronel Taylor RN  09/19/23 [id number]

## 2023-09-20 NOTE — ED PROVIDER NOTES
meals)      chlorthalidone (HYGROTON) 25 MG tablet Take 1 tablet by mouth daily      vitamin D 25 MCG (1000 UT) CAPS Take 1 capsule by mouth daily      lisinopril (PRINIVIL;ZESTRIL) 10 MG tablet Take 1 tablet by mouth daily      nitroGLYCERIN (NITROSTAT) 0.4 MG SL tablet Place 1 tablet under the tongue every 5 minutes as needed         Past History     Past Medical History:  Past Medical History:   Diagnosis Date    Anemia     Hypertension     MI (myocardial infarction) ([address])     NSTEMI (non-ST elevated myocardial infarction) ([address])     12/2018       Past Surgical History:  Past Surgical History:   Procedure Laterality Date    APPENDECTOMY      CARDIAC CATHETERIZATION         Family History:  Family History   Problem Relation Age of Onset    Diabetes Mother     Heart Failure Mother     Hypertension Mother     Breast Cancer Mother     Breast Cancer Sister     Breast Cancer Sister        Social History:  Social History     Tobacco Use    Smoking status: Never    Smokeless tobacco: Never   Substance Use Topics    Alcohol use: No     Alcohol/week: 0.0 standard drinks of alcohol    Drug use: No       Allergies: Allergies   Allergen Reactions    Amlodipine Swelling     Ankle swelling          Physical Exam     General: Patient is awake and alert, resting comfortably in no acute distress. Eyes: No scleral injection or discharge. ENT: No tonsillar perjury or exudates, uvula midline, OP moist oral mucosal membranes. Cardiovascular: RRR, no murmurs, warm, well-perfused extremities. 2+ radial pulses. Respiratory: Normal respiratory effort. Lung sounds clear to auscultation. Skin: Warm, dry, and intact. No visible rashes or jaundice appreciated. Neuro: The patient is alert and oriented, no gross motor defects noted. Psych: Appropriate mood and affect.         Lab and Diagnostic Study Results     Labs -  Recent Results (from the past 24 hour(s))   COVID-19, Rapid    Collection Time: 09/19/23  7:45 PM    Specimen:

## 2023-09-21 LAB
BACTERIA SPEC CULT: NORMAL
SERVICE CMNT-IMP: NORMAL

## 2023-11-12 ENCOUNTER — HOSPITAL ENCOUNTER (EMERGENCY)
Facility: HOSPITAL | Age: 58
Discharge: HOME | End: 2023-11-12
Attending: EMERGENCY MEDICINE
Payer: OTHER GOVERNMENT

## 2023-11-12 VITALS
RESPIRATION RATE: 16 BRPM | OXYGEN SATURATION: 100 % | WEIGHT: 159 LBS | SYSTOLIC BLOOD PRESSURE: 111 MMHG | DIASTOLIC BLOOD PRESSURE: 62 MMHG | HEART RATE: 83 BPM | HEIGHT: 61 IN | TEMPERATURE: 100.1 F | BODY MASS INDEX: 30.02 KG/M2

## 2023-11-12 DIAGNOSIS — U07.1 COVID-19: Primary | ICD-10-CM

## 2023-11-12 LAB
FLUAV AG NPH QL IA: NEGATIVE
FLUBV AG NOSE QL IA: NEGATIVE
SARS-COV-2 RDRP RESP QL NAA+PROBE: DETECTED
SOURCE: ABNORMAL

## 2023-11-12 PROCEDURE — 87635 SARS-COV-2 COVID-19 AMP PRB: CPT

## 2023-11-12 PROCEDURE — 99283 EMERGENCY DEPT VISIT LOW MDM: CPT

## 2023-11-12 PROCEDURE — 6370000000 HC RX 637 (ALT 250 FOR IP): Performed by: EMERGENCY MEDICINE

## 2023-11-12 PROCEDURE — 87804 INFLUENZA ASSAY W/OPTIC: CPT

## 2023-11-12 RX ORDER — IBUPROFEN 400 MG/1
400 TABLET ORAL
Status: COMPLETED | OUTPATIENT
Start: 2023-11-12 | End: 2023-11-12

## 2023-11-12 RX ORDER — ACETAMINOPHEN 325 MG/1
650 TABLET ORAL
Status: COMPLETED | OUTPATIENT
Start: 2023-11-12 | End: 2023-11-12

## 2023-11-12 RX ADMIN — ACETAMINOPHEN 325MG 650 MG: 325 TABLET ORAL at 09:18

## 2023-11-12 RX ADMIN — IBUPROFEN 400 MG: 400 TABLET, FILM COATED ORAL at 09:17

## 2023-11-12 ASSESSMENT — ENCOUNTER SYMPTOMS
SHORTNESS OF BREATH: 0
COUGH: 1

## 2023-11-12 ASSESSMENT — PAIN - FUNCTIONAL ASSESSMENT: PAIN_FUNCTIONAL_ASSESSMENT: 0-10

## 2023-11-12 ASSESSMENT — PAIN SCALES - GENERAL: PAINLEVEL_OUTOF10: 8

## 2023-11-12 NOTE — ED PROVIDER NOTES
[facility] EMERGENCY DEPT  EMERGENCY DEPARTMENT ENCOUNTER      Pt Name: Marielos Liriano  MRN: [id number]  [address] [facility] [place] 1965  Date of evaluation: 11/12/2023  Provider: Juanita Orr MD    [address]       Chief Complaint   Patient presents with    Cough    Headache    Generalized Body Aches         HISTORY OF PRESENT ILLNESS   (Location/Symptom, Timing/Onset, Context/Setting, Quality, Duration, Modifying Factors, Severity)  Note limiting factors. Marielos Liriano is a 60 y.o. female who presents to the emergency department     58-year-old female past medical history of MI NSTEMI hypertension anemia presents emergency department with body aches cough and fever. This been going on for the past few days. Patient works at a job with people with [employer]. Patient treated self with Tylenol at home. No other issues expressed. No shortness of breath with exertion no shortness of breath. The history is provided by the patient and medical records. No  was used. Nursing Notes were reviewed. REVIEW OF SYSTEMS    (2-9 systems for level 4, 10 or more for level 5)     Review of Systems   Constitutional:  Positive for fever. Respiratory:  Positive for cough. Negative for shortness of breath. Cardiovascular: Negative. Genitourinary: Negative. Except as noted above the remainder of the review of systems was reviewed and negative.        PAST MEDICAL HISTORY     Past Medical History:   Diagnosis Date    Anemia     Hypertension     MI (myocardial infarction) ([address])     NSTEMI (non-ST elevated myocardial infarction) ([address])     12/2018         SURGICAL HISTORY       Past Surgical History:   Procedure Laterality Date    APPENDECTOMY      CARDIAC CATHETERIZATION           CURRENT MEDICATIONS       Previous Medications    ALBUTEROL SULFATE HFA (PROVENTIL;VENTOLIN;PROAIR) 108 (90 BASE) MCG/ACT INHALER    Inhale 2 puffs into the lungs every 4 hours as needed for Wheezing    ASPIRIN 81 MG CHEWABLE

## 2023-11-12 NOTE — ED NOTES
Discharge instructions reviewed with patient. Patient advised to follow up as directed on discharge instructions. Patient denies questions, needs or concerns at this time. Patient verbalized understanding. No s/sx of distress noted.         Yonathan Resendiz RN  11/12/23 [id number]

## 2023-11-12 NOTE — ED TRIAGE NOTES
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 Denies SOB or CP.

## 2023-11-20 RX ORDER — CHLORTHALIDONE 25 MG/1
25 TABLET ORAL DAILY
Qty: 90 TABLET | Refills: 3 | Status: SHIPPED | OUTPATIENT
Start: 2023-11-20

## 2023-11-20 RX ORDER — CARVEDILOL 12.5 MG/1
12.5 TABLET ORAL 2 TIMES DAILY WITH MEALS
Qty: 180 TABLET | Refills: 3 | Status: SHIPPED | OUTPATIENT
Start: 2023-11-20

## 2023-11-20 RX ORDER — LISINOPRIL 10 MG/1
10 TABLET ORAL DAILY
Qty: 90 TABLET | Refills: 3 | Status: SHIPPED | OUTPATIENT
Start: 2023-11-20

## 2023-11-20 RX ORDER — ATORVASTATIN CALCIUM 40 MG/1
40 TABLET, FILM COATED ORAL NIGHTLY
Qty: 90 TABLET | Refills: 3 | Status: SHIPPED | OUTPATIENT
Start: 2023-11-20

## 2024-03-06 ENCOUNTER — APPOINTMENT (OUTPATIENT)
Facility: HOSPITAL | Age: 59
End: 2024-03-06
Payer: OTHER GOVERNMENT

## 2024-03-06 ENCOUNTER — HOSPITAL ENCOUNTER (EMERGENCY)
Facility: HOSPITAL | Age: 59
Discharge: HOME | End: 2024-03-06
Attending: STUDENT IN AN ORGANIZED HEALTH CARE EDUCATION/TRAINING PROGRAM
Payer: OTHER GOVERNMENT

## 2024-03-06 VITALS
TEMPERATURE: 98.2 F | WEIGHT: 161 LBS | OXYGEN SATURATION: 98 % | DIASTOLIC BLOOD PRESSURE: 66 MMHG | SYSTOLIC BLOOD PRESSURE: 113 MMHG | HEIGHT: 61 IN | HEART RATE: 63 BPM | BODY MASS INDEX: 30.4 KG/M2 | RESPIRATION RATE: 20 BRPM

## 2024-03-06 DIAGNOSIS — S39.012A STRAIN OF LUMBAR REGION, INITIAL ENCOUNTER: Primary | ICD-10-CM

## 2024-03-06 PROCEDURE — 72080 X-RAY EXAM THORACOLMB 2/> VW: CPT

## 2024-03-06 PROCEDURE — 99283 EMERGENCY DEPT VISIT LOW MDM: CPT

## 2024-03-06 RX ORDER — CYCLOBENZAPRINE HCL 10 MG
10 TABLET ORAL 3 TIMES DAILY PRN
Qty: 21 TABLET | Refills: 0 | Status: SHIPPED | OUTPATIENT
Start: 2024-03-06 | End: 2024-03-16

## 2024-03-06 RX ORDER — LIDOCAINE 50 MG/G
1 PATCH TOPICAL DAILY
Qty: 10 PATCH | Refills: 0 | Status: SHIPPED | OUTPATIENT
Start: 2024-03-06 | End: 2024-03-16

## 2024-03-06 RX ORDER — DICLOFENAC POTASSIUM 50 MG/1
50 TABLET, FILM COATED ORAL 3 TIMES DAILY
Qty: 20 TABLET | Refills: 0 | Status: SHIPPED | OUTPATIENT
Start: 2024-03-06 | End: 2024-03-13

## 2024-03-06 ASSESSMENT — ENCOUNTER SYMPTOMS
NAUSEA: 0
COUGH: 0
DIARRHEA: 0
VOMITING: 0
BACK PAIN: 1
SHORTNESS OF BREATH: 0

## 2024-03-06 ASSESSMENT — PAIN - FUNCTIONAL ASSESSMENT: PAIN_FUNCTIONAL_ASSESSMENT: 0-10

## 2024-03-06 ASSESSMENT — PAIN SCALES - GENERAL: PAINLEVEL_OUTOF10: 8

## 2024-03-06 NOTE — ED TRIAGE NOTES
Pt c/o mid back nu that started last night. Worse today. Took tylenol last night. States she never had problems with her back until she fell down some stairs in January.

## 2024-03-06 NOTE — ED PROVIDER NOTES
[facility] EMERGENCY DEPT  EMERGENCY DEPARTMENT ENCOUNTER     Pt Name: Livia Erickson  MRN: [id number]  Birthdate 1965  Date of evaluation: 3/6/2024  Provider: Fredy Snowden MD  PCP: Rosita Ocasio MD  Note Started: 9:16 AM EST 3/6/24    Chief Complaint  Back Pain      History of Present Illness  Livia Erickson is a 58 y.o. female with a history of HTN who presents to the ED with a chief complaint of back pain.  Patient reports she injured her back back in January, had negative x-rays at that time.  Patient reports she has been recovering, until yesterday when she felt like she was moving boxes and she felt like she reinjured her back.  Patient is still able to ambulate. Patient denies fevers, saddle anesthesia, urinary retention, incontinence, loss of strength, past history of IV drug use.     Medical History  Past Medical History:   Diagnosis Date    Anemia     Hypertension     MI (myocardial infarction) ([facility])     NSTEMI (non-ST elevated myocardial infarction) ([facility])     12/2018     Past Surgical History:   Procedure Laterality Date    APPENDECTOMY      CARDIAC CATHETERIZATION       Family History   Problem Relation Age of Onset    Diabetes Mother     Heart Failure Mother     Hypertension Mother     Breast Cancer Mother     Breast Cancer Sister     Breast Cancer Sister      Social History     Social History Narrative    Not on file      Social History     Tobacco Use    Smoking status: Never     Passive exposure: Never    Smokeless tobacco: Never   Substance Use Topics    Alcohol use: No     Alcohol/week: 0.0 standard drinks of alcohol    Drug use: No        Review of Systems  Review of Systems   Constitutional:  Negative for activity change, fever and unexpected weight change.   Respiratory:  Negative for cough and shortness of breath.    Cardiovascular:  Negative for chest pain.   Gastrointestinal:  Negative for diarrhea, nausea and vomiting.   Genitourinary:  Negative for dysuria.   Musculoskeletal:

## 2024-03-15 NOTE — Clinical Note
Multiple views of the right coronary artery obtained using hand injection. What Type Of Note Output Would You Prefer (Optional)?: Bullet Format Hpi Title: Evaluation of Skin Lesions

## 2024-03-27 ENCOUNTER — OFFICE VISIT (OUTPATIENT)
Age: 59
End: 2024-03-27

## 2024-03-27 VITALS
BODY MASS INDEX: 27.85 KG/M2 | SYSTOLIC BLOOD PRESSURE: 110 MMHG | OXYGEN SATURATION: 100 % | HEIGHT: 63 IN | WEIGHT: 157.2 LBS | RESPIRATION RATE: 16 BRPM | TEMPERATURE: 97.1 F | DIASTOLIC BLOOD PRESSURE: 78 MMHG | HEART RATE: 64 BPM

## 2024-03-27 DIAGNOSIS — G47.9 TROUBLE IN SLEEPING: ICD-10-CM

## 2024-03-27 DIAGNOSIS — E55.9 VITAMIN D DEFICIENCY: ICD-10-CM

## 2024-03-27 DIAGNOSIS — I25.2 HISTORY OF NON-ST ELEVATION MYOCARDIAL INFARCTION (NSTEMI): ICD-10-CM

## 2024-03-27 DIAGNOSIS — N64.4 BREAST PAIN IN FEMALE: ICD-10-CM

## 2024-03-27 DIAGNOSIS — Z00.00 ENCOUNTER FOR WELL ADULT EXAM WITHOUT ABNORMAL FINDINGS: Primary | ICD-10-CM

## 2024-03-27 DIAGNOSIS — N28.9 RENAL INSUFFICIENCY: ICD-10-CM

## 2024-03-27 DIAGNOSIS — R73.01 IMPAIRED FASTING BLOOD SUGAR: ICD-10-CM

## 2024-03-27 PROBLEM — I21.4 NSTEMI (NON-ST ELEVATED MYOCARDIAL INFARCTION) (HCC): Status: RESOLVED | Noted: 2018-12-12 | Resolved: 2024-03-27

## 2024-03-27 RX ORDER — TRAZODONE HYDROCHLORIDE 100 MG/1
TABLET ORAL
Qty: 90 TABLET | Refills: 0 | Status: SHIPPED | OUTPATIENT
Start: 2024-03-27

## 2024-03-27 RX ORDER — LIDOCAINE 50 MG/G
PATCH TOPICAL
COMMUNITY
Start: 2024-03-06

## 2024-03-27 SDOH — ECONOMIC STABILITY: FOOD INSECURITY: WITHIN THE PAST 12 MONTHS, THE FOOD YOU BOUGHT JUST DIDN'T LAST AND YOU DIDN'T HAVE MONEY TO GET MORE.: NEVER TRUE

## 2024-03-27 SDOH — ECONOMIC STABILITY: FOOD INSECURITY: WITHIN THE PAST 12 MONTHS, YOU WORRIED THAT YOUR FOOD WOULD RUN OUT BEFORE YOU GOT MONEY TO BUY MORE.: NEVER TRUE

## 2024-03-27 SDOH — ECONOMIC STABILITY: INCOME INSECURITY: HOW HARD IS IT FOR YOU TO PAY FOR THE VERY BASICS LIKE FOOD, HOUSING, MEDICAL CARE, AND HEATING?: NOT HARD AT ALL

## 2024-03-27 ASSESSMENT — PATIENT HEALTH QUESTIONNAIRE - PHQ9
SUM OF ALL RESPONSES TO PHQ QUESTIONS 1-9: 0
SUM OF ALL RESPONSES TO PHQ9 QUESTIONS 1 & 2: 0
SUM OF ALL RESPONSES TO PHQ QUESTIONS 1-9: 0
1. LITTLE INTEREST OR PLEASURE IN DOING THINGS: NOT AT ALL
2. FEELING DOWN, DEPRESSED OR HOPELESS: NOT AT ALL
SUM OF ALL RESPONSES TO PHQ QUESTIONS 1-9: 0
SUM OF ALL RESPONSES TO PHQ QUESTIONS 1-9: 0

## 2024-03-27 NOTE — PATIENT INSTRUCTIONS
make and go to all appointments, and call your doctor if you are having problems. It's also a good idea to know your test results and keep a list of the medicines you take.  Where can you learn more?  Go to https://[email or web address].com.net/patientEd and enter U807 to learn more about \"A Healthy Lifestyle: Care Instructions.\"  Current as of: August 6, 2023               Content Version: 14.0  © 2006-2024 [employer].   Care instructions adapted under license by [employer]. If you have questions about a medical condition or this instruction, always ask your healthcare professional. [employer] disclaims any warranty or liability for your use of this information.

## 2024-03-27 NOTE — PROGRESS NOTES
\"Have you been to the ER, urgent care clinic since your last visit?  Hospitalized since your last visit?\"    Multiple ED visits - see chart    “Have you seen or consulted any other health care providers outside of [facility] since your last visit?”    NO        Last pap smear - 8/26/21  Last mammogram - 5/15/23  Last colonoscopy - 1/30/2020    Chief Complaint   Patient presents with    Annual Exam    Medication Check     Patient wants to discuss increasing dose for Trazodone         Click Here for Release of Records Request  
abdominal pain. No cold or cough. No leg swelling. No fever.   CMP:  Lab Results   Component Value Date/Time     09/18/2023 08:36 AM    K 3.8 09/18/2023 08:36 AM     09/18/2023 08:36 AM    CO2 29 09/18/2023 08:36 AM    BUN 20 09/18/2023 08:36 AM    CREATININE 1.18 09/18/2023 08:36 AM    GLUCOSE 109 09/18/2023 08:36 AM    CALCIUM 9.2 09/18/2023 08:36 AM    PROT 7.5 09/18/2023 08:36 AM    LABALBU 3.8 09/18/2023 08:36 AM    BILITOT 0.5 09/18/2023 08:36 AM    AST 20 09/18/2023 08:36 AM    ALT 28 09/18/2023 08:36 AM          CBC:  Lab Results   Component Value Date/Time    WBC 8.0 09/18/2023 08:36 AM    RBC 3.75 09/18/2023 08:36 AM    HGB 11.0 09/18/2023 08:36 AM    HCT 33.9 09/18/2023 08:36 AM    MCV 90.4 09/18/2023 08:36 AM    MCH 29.3 09/18/2023 08:36 AM    MCHC 32.4 09/18/2023 08:36 AM    RDW 13.8 09/18/2023 08:36 AM     09/18/2023 08:36 AM    MPV 11.4 09/18/2023 08:36 AM        Lipids   Lab Results   Component Value Date/Time    CHOL 153 09/18/2023 08:36 AM    TRIG 80 09/18/2023 08:36 AM    HDL 49 09/18/2023 08:36 AM    LDLCALC 88 09/18/2023 08:36 AM    CHOLHDLRATIO 3.1 09/18/2023 08:36 AM       A1c:   Hemoglobin A1C   Date Value Ref Range Status   09/18/2023 6.0 (H) 4.2 - 5.6 % Final     Comment:     (NOTE)  HbA1C Interpretive Ranges  <5.7              Normal  5.7 - 6.4         Consider Prediabetes  >6.5              Consider Diabetes     12/27/2021 6.2 (H) 4.2 - 5.6 % Final     Comment:     (NOTE)  HbA1C Interpretive Ranges  <5.7              Normal  5.7 - 6.4         Consider Prediabetes  >6.5              Consider Diabetes     09/25/2020 5.9 (H) 4.2 - 5.6 % Final     Comment:     (NOTE)  HbA1C Interpretive Ranges  <5.7              Normal  5.7 - 6.4         Consider Prediabetes  >6.5              Consider Diabetes           Microalbumin: No results found for: \"MALBCR\"    TSH: No results found for: \"TSH\"    Vit D:   Lab Results   Component Value Date/Time    VITD25 47.2 09/18/2023 08:36 AM

## 2024-04-17 ENCOUNTER — HOSPITAL ENCOUNTER (OUTPATIENT)
Facility: HOSPITAL | Age: 59
Discharge: HOME | End: 2024-04-20
Attending: FAMILY MEDICINE
Payer: OTHER GOVERNMENT

## 2024-04-17 VITALS — BODY MASS INDEX: 29.26 KG/M2 | WEIGHT: 159 LBS | HEIGHT: 62 IN

## 2024-04-17 DIAGNOSIS — Z00.00 ENCOUNTER FOR WELL ADULT EXAM WITHOUT ABNORMAL FINDINGS: ICD-10-CM

## 2024-04-17 DIAGNOSIS — N64.4 BREAST PAIN IN FEMALE: ICD-10-CM

## 2024-04-17 PROCEDURE — 77066 DX MAMMO INCL CAD BI: CPT

## 2024-04-30 ENCOUNTER — HOSPITAL ENCOUNTER (EMERGENCY)
Facility: HOSPITAL | Age: 59
Discharge: HOME | End: 2024-04-30
Attending: EMERGENCY MEDICINE
Payer: OTHER GOVERNMENT

## 2024-04-30 VITALS
BODY MASS INDEX: 29.08 KG/M2 | TEMPERATURE: 98.2 F | SYSTOLIC BLOOD PRESSURE: 124 MMHG | OXYGEN SATURATION: 98 % | WEIGHT: 159 LBS | HEART RATE: 76 BPM | DIASTOLIC BLOOD PRESSURE: 65 MMHG | RESPIRATION RATE: 18 BRPM

## 2024-04-30 DIAGNOSIS — J06.9 UPPER RESPIRATORY TRACT INFECTION, UNSPECIFIED TYPE: Primary | ICD-10-CM

## 2024-04-30 PROCEDURE — 99283 EMERGENCY DEPT VISIT LOW MDM: CPT

## 2024-04-30 RX ORDER — BENZONATATE 200 MG/1
200 CAPSULE ORAL 3 TIMES DAILY PRN
Qty: 20 CAPSULE | Refills: 0 | Status: SHIPPED | OUTPATIENT
Start: 2024-04-30 | End: 2024-05-07

## 2024-04-30 RX ORDER — IBUPROFEN 600 MG/1
600 TABLET ORAL 4 TIMES DAILY PRN
Qty: 16 TABLET | Refills: 0 | Status: SHIPPED | OUTPATIENT
Start: 2024-04-30

## 2024-04-30 ASSESSMENT — LIFESTYLE VARIABLES
HOW OFTEN DO YOU HAVE A DRINK CONTAINING ALCOHOL: NEVER
HOW MANY STANDARD DRINKS CONTAINING ALCOHOL DO YOU HAVE ON A TYPICAL DAY: PATIENT DOES NOT DRINK

## 2024-04-30 ASSESSMENT — PAIN - FUNCTIONAL ASSESSMENT: PAIN_FUNCTIONAL_ASSESSMENT: NONE - DENIES PAIN

## 2024-04-30 NOTE — ED TRIAGE NOTES
Patient A/O x 4, presented to the ED with complaint of cough, chest congestion, sore throat x 1 day.

## 2024-04-30 NOTE — ED PROVIDER NOTES
[facility] EMERGENCY DEPT  EMERGENCY DEPARTMENT ENCOUNTER      Pt Name: Livia Erickson  MRN: [id number]  Birthdate 1965  Date of evaluation: 4/30/2024  Provider: Jayme Angeles MD    CHIEF COMPLAINT       Chief Complaint   Patient presents with    Sore Throat    Cough       HPI:  Livia Erickson is a 58 y.o. female who presents to the emergency department pt c/o cough/congestion/sore throat x one day. No fever.  No abd or cp. No other complaints inc no n/v/d     HPI    Nursing Notes were reviewed.    REVIEW OF SYSTEMS    (2-9 systems for level 4, 10 or more for level 5)     Review of Systems    Except as noted above the remainder of the review of systems was reviewed and negative.       PAST MEDICAL HISTORY     Past Medical History:   Diagnosis Date    Anemia     Hypertension     MI (myocardial infarction) ([facility])     NSTEMI (non-ST elevated myocardial infarction) ([facility])     12/2018         SURGICAL HISTORY       Past Surgical History:   Procedure Laterality Date    APPENDECTOMY      CARDIAC CATHETERIZATION           CURRENT MEDICATIONS       Previous Medications    ALBUTEROL SULFATE HFA (PROVENTIL;VENTOLIN;PROAIR) 108 (90 BASE) MCG/ACT INHALER    Inhale 2 puffs into the lungs every 4 hours as needed for Wheezing    ASPIRIN 81 MG CHEWABLE TABLET    Take 1 tablet by mouth daily    ATORVASTATIN (LIPITOR) 40 MG TABLET    Take 1 tablet by mouth at bedtime    CARVEDILOL (COREG) 12.5 MG TABLET    Take 1 tablet by mouth 2 times daily (with meals)    CHLORTHALIDONE (HYGROTON) 25 MG TABLET    Take 1 tablet by mouth daily    DEXTROMETHORPHAN-GUAIFENESIN (MUCINEX DM)  MG TB12    Take 1 tablet by mouth 2 times daily as needed (cough)    LIDOCAINE (LIDODERM) 5 %        LISINOPRIL (PRINIVIL;ZESTRIL) 10 MG TABLET    Take 1 tablet by mouth daily    MULTIPLE VITAMIN (MULTIVITAMIN ADULT) TABS    Take by mouth    NITROGLYCERIN (NITROSTAT) 0.4 MG SL TABLET    Place 1 tablet under the tongue every 5 minutes as needed    TRAZODONE

## 2024-06-27 ENCOUNTER — TELEPHONE (OUTPATIENT)
Facility: CLINIC | Age: 59
End: 2024-06-27

## 2024-06-27 NOTE — TELEPHONE ENCOUNTER
This pharmacy faxed over request for the following prescriptions to be filled:    Medication requested : traZODone (DESYREL) 100 MG tablet QTY 90  PCP: Amarjit  Pharmacy or Print: [facility]   Mail order or Local pharmacy [address]     Scheduled appointment if not seen by current providers in office:  LOV 3/27/2024 Pramod 9*30/2024

## 2024-06-28 RX ORDER — TRAZODONE HYDROCHLORIDE 100 MG/1
TABLET ORAL
Qty: 90 TABLET | Refills: 1 | Status: SHIPPED | OUTPATIENT
Start: 2024-06-28

## 2024-07-05 ENCOUNTER — OFFICE VISIT (OUTPATIENT)
Age: 59
End: 2024-07-05
Payer: OTHER GOVERNMENT

## 2024-07-05 VITALS
DIASTOLIC BLOOD PRESSURE: 76 MMHG | HEART RATE: 62 BPM | WEIGHT: 160 LBS | SYSTOLIC BLOOD PRESSURE: 100 MMHG | HEIGHT: 62 IN | OXYGEN SATURATION: 99 % | BODY MASS INDEX: 29.44 KG/M2

## 2024-07-05 DIAGNOSIS — I10 ESSENTIAL (PRIMARY) HYPERTENSION: ICD-10-CM

## 2024-07-05 DIAGNOSIS — I25.42 CORONARY ARTERY DISSECTION: Primary | ICD-10-CM

## 2024-07-05 PROCEDURE — 3078F DIAST BP <80 MM HG: CPT | Performed by: INTERNAL MEDICINE

## 2024-07-05 PROCEDURE — 3074F SYST BP LT 130 MM HG: CPT | Performed by: INTERNAL MEDICINE

## 2024-07-05 PROCEDURE — 99214 OFFICE O/P EST MOD 30 MIN: CPT | Performed by: INTERNAL MEDICINE

## 2024-07-05 PROCEDURE — 93000 ELECTROCARDIOGRAM COMPLETE: CPT | Performed by: INTERNAL MEDICINE

## 2024-07-05 ASSESSMENT — PATIENT HEALTH QUESTIONNAIRE - PHQ9
1. LITTLE INTEREST OR PLEASURE IN DOING THINGS: NOT AT ALL
2. FEELING DOWN, DEPRESSED OR HOPELESS: NOT AT ALL
SUM OF ALL RESPONSES TO PHQ QUESTIONS 1-9: 0
SUM OF ALL RESPONSES TO PHQ9 QUESTIONS 1 & 2: 0
SUM OF ALL RESPONSES TO PHQ QUESTIONS 1-9: 0

## 2024-07-05 NOTE — PROGRESS NOTES
Livia Erickson presents today for   Chief Complaint   Patient presents with    Follow-up     1 year f/u     Shortness of Breath     SOB with exertion       Livia Erickson preferred language for health care discussion is english/other.    Is someone accompanying this pt? no    Is the patient using any DME equipment during OV? no    Depression Screening:  Depression: Not at risk (7/5/2024)    PHQ-2     PHQ-2 Score: 0        Learning Assessment:  Who is the primary learner? Patient    What is the preferred language for health care of the primary learner? ENGLISH    How does the primary learner prefer to learn new concepts? DEMONSTRATION    Answered By patient    Relationship to Learner SELF           Pt currently taking Anticoagulant therapy? no    Pt currently taking Antiplatelet therapy ? ASA 81 mg 1x daily       Coordination of Care:  1. Have you been to the ER, urgent care clinic since your last visit? Hospitalized since your last visit? yes    2. Have you seen or consulted any other health care providers outside of the [facility] since your last visit? Include any pap smears or colon screening. no

## 2024-07-05 NOTE — PROGRESS NOTES
Livia Erickson    f/u NSTEMI/ nonatherosclerotic MI    HPI    Livia Erickson is a 58 y.o.  perimenopausal woman with HTN, presented to 2018 ER c/o URI symptoms including CP- ended up NSTEMI, initial cath called \"normal coronaries.\" Due to continued pain, another cath few days later revealed an occluded mid/ distal OM2. Also FMD of right iliac artery noted. Her EF remained normal without any WMA. I reviewed her hospital records in detail, and have elaborated the findings:    NSTEMI with troponin up to 7.66, initial ECG without new ischemic changes, presented with CP. Recent NSTEMI with peak troponin 13.4 with nonobstructive CAD by cath as noted below.  Nonobstructive CAD by cath (LM normal, LAD minimal luminal irregularities, left Cx normal, RCA normal) by Dr. Lee 2018. Possible transient thrombus vs vasospasm.  Hypertension with elevated BP on admission.  Upper respiratory infection, recent cough and congestion with low grade fever 99.5 without leukocytosis.  Normal LV function 56-60% by echo 2018 without WMA 2018.  Mild to moderate tricuspid valve regurgitation with mild pulmonary hypertension PASP 43mmHg by echo 2018.    She was discharged on medical therapy (included statin, BB, CCB, plavix). She was tried on Imdur but it gave her headaches. Since the hospitalization, she is significantly better but says almost every night she could feel some chest pressure. She went for a second opinion at [facility].  Reviewed these records and see that patient was sent to cardiac rehab.  Medications were adjusted which included stopping metoprolol and starting carvedilol and stopping nifedipine and starting chlorthalidone.  Her CP improved over the next several months, and she completed cardiac rehab.    She was lost to follow up for awhile then came back when discharged from [facility].     Overall she has no complaints. She changed jobs which she thinks also helped.   She has had

## 2024-07-14 ENCOUNTER — HOSPITAL ENCOUNTER (EMERGENCY)
Facility: HOSPITAL | Age: 59
Discharge: HOME | End: 2024-07-14
Attending: EMERGENCY MEDICINE
Payer: OTHER GOVERNMENT

## 2024-07-14 VITALS
TEMPERATURE: 97.8 F | HEART RATE: 61 BPM | BODY MASS INDEX: 30.21 KG/M2 | WEIGHT: 160 LBS | OXYGEN SATURATION: 100 % | SYSTOLIC BLOOD PRESSURE: 105 MMHG | HEIGHT: 61 IN | DIASTOLIC BLOOD PRESSURE: 60 MMHG | RESPIRATION RATE: 18 BRPM

## 2024-07-14 DIAGNOSIS — J01.01 ACUTE RECURRENT MAXILLARY SINUSITIS: Primary | ICD-10-CM

## 2024-07-14 LAB
SARS-COV-2 RDRP RESP QL NAA+PROBE: NOT DETECTED
SOURCE: NORMAL

## 2024-07-14 PROCEDURE — 87635 SARS-COV-2 COVID-19 AMP PRB: CPT

## 2024-07-14 PROCEDURE — 99283 EMERGENCY DEPT VISIT LOW MDM: CPT

## 2024-07-14 RX ORDER — AMOXICILLIN 875 MG/1
875 TABLET, COATED ORAL 2 TIMES DAILY
Qty: 20 TABLET | Refills: 0 | Status: SHIPPED | OUTPATIENT
Start: 2024-07-14 | End: 2024-07-24

## 2024-07-14 RX ORDER — FLUTICASONE PROPIONATE 50 MCG
2 SPRAY, SUSPENSION (ML) NASAL DAILY
Qty: 16 G | Refills: 0 | Status: SHIPPED | OUTPATIENT
Start: 2024-07-14

## 2024-07-14 ASSESSMENT — PAIN - FUNCTIONAL ASSESSMENT: PAIN_FUNCTIONAL_ASSESSMENT: NONE - DENIES PAIN

## 2024-07-14 NOTE — ED TRIAGE NOTES
Sinus pain pressure for approx 1 month, has tried over the counter meds during this time without relief.

## 2024-07-14 NOTE — ED PROVIDER NOTES
grammatical, syntax, homophones, and other interpretive errors are inadvertently transcribed by the computer software. Please disregard these errors. Please excuse any errors that have escaped final proofreading.      I Jaxon Grimm DO am the primary clinician of record.  Jaxon Grimm DO  (Electronically signed)           Jaxon Grimm DO  07/14/24 1002

## 2024-10-22 ENCOUNTER — OFFICE VISIT (OUTPATIENT)
Facility: CLINIC | Age: 59
End: 2024-10-22
Payer: OTHER GOVERNMENT

## 2024-10-22 VITALS
BODY MASS INDEX: 28.56 KG/M2 | SYSTOLIC BLOOD PRESSURE: 108 MMHG | TEMPERATURE: 97 F | OXYGEN SATURATION: 94 % | RESPIRATION RATE: 16 BRPM | HEIGHT: 63 IN | HEART RATE: 64 BPM | WEIGHT: 161.2 LBS | DIASTOLIC BLOOD PRESSURE: 70 MMHG

## 2024-10-22 DIAGNOSIS — Z13.220 SCREENING FOR HYPERLIPIDEMIA: ICD-10-CM

## 2024-10-22 DIAGNOSIS — N28.9 RENAL INSUFFICIENCY: ICD-10-CM

## 2024-10-22 DIAGNOSIS — Z20.1 EXPOSURE TO TB: Primary | ICD-10-CM

## 2024-10-22 DIAGNOSIS — I25.2 HISTORY OF NON-ST ELEVATION MYOCARDIAL INFARCTION (NSTEMI): ICD-10-CM

## 2024-10-22 DIAGNOSIS — I25.10 CORONARY ARTERY DISEASE INVOLVING NATIVE CORONARY ARTERY OF NATIVE HEART WITHOUT ANGINA PECTORIS: ICD-10-CM

## 2024-10-22 DIAGNOSIS — G47.9 SLEEP TROUBLE: ICD-10-CM

## 2024-10-22 DIAGNOSIS — R73.01 IMPAIRED FASTING BLOOD SUGAR: ICD-10-CM

## 2024-10-22 PROCEDURE — 3078F DIAST BP <80 MM HG: CPT | Performed by: FAMILY MEDICINE

## 2024-10-22 PROCEDURE — 99214 OFFICE O/P EST MOD 30 MIN: CPT | Performed by: FAMILY MEDICINE

## 2024-10-22 PROCEDURE — 3074F SYST BP LT 130 MM HG: CPT | Performed by: FAMILY MEDICINE

## 2024-10-22 SDOH — ECONOMIC STABILITY: FOOD INSECURITY: WITHIN THE PAST 12 MONTHS, THE FOOD YOU BOUGHT JUST DIDN'T LAST AND YOU DIDN'T HAVE MONEY TO GET MORE.: NEVER TRUE

## 2024-10-22 SDOH — ECONOMIC STABILITY: INCOME INSECURITY: HOW HARD IS IT FOR YOU TO PAY FOR THE VERY BASICS LIKE FOOD, HOUSING, MEDICAL CARE, AND HEATING?: NOT HARD AT ALL

## 2024-10-22 SDOH — ECONOMIC STABILITY: FOOD INSECURITY: WITHIN THE PAST 12 MONTHS, YOU WORRIED THAT YOUR FOOD WOULD RUN OUT BEFORE YOU GOT MONEY TO BUY MORE.: NEVER TRUE

## 2024-10-22 ASSESSMENT — PATIENT HEALTH QUESTIONNAIRE - PHQ9
1. LITTLE INTEREST OR PLEASURE IN DOING THINGS: NOT AT ALL
SUM OF ALL RESPONSES TO PHQ QUESTIONS 1-9: 0
SUM OF ALL RESPONSES TO PHQ QUESTIONS 1-9: 0
2. FEELING DOWN, DEPRESSED OR HOPELESS: NOT AT ALL
SUM OF ALL RESPONSES TO PHQ QUESTIONS 1-9: 0
SUM OF ALL RESPONSES TO PHQ9 QUESTIONS 1 & 2: 0
SUM OF ALL RESPONSES TO PHQ QUESTIONS 1-9: 0

## 2024-10-22 NOTE — PROGRESS NOTES
\"Have you been to the ER, urgent care clinic since your last visit?  Hospitalized since your last visit?\"    [facility] LOV: 7/14/24 & 4/30/24. [facility] LOV: 9/02/24 for positive COVID-19.    “Have you seen or consulted any other health care providers outside our system since your last visit?”    NO    Chief Complaint   Patient presents with    Sleep Problem    Vitamin D deficiency    Blood Sugar Problem    Renal insufficiency

## 2024-10-22 NOTE — PROGRESS NOTES
Livia Erickson (:  1965) is a 59 y.o. female, Established patient, here for evaluation of the following chief complaint(s):  Sleep Problem, Vitamin D deficiency, Blood Sugar Problem, Renal insufficiency, and Chills (Had chills last night, but she feels fine today)         Assessment & Plan  1. Tuberculosis Exposure.  She had exposure to two individuals who tested positive for TB. Her PPD test was negative. She is advised to continue with regular PPD test screenings. If symptoms such as cough, fever, appetite changes, or weight changes develop, a chest x-ray will be performed.    2. Prediabetes.  She has been working on diet and lifestyle modifications, including exercise and weight loss. She reported a weight reduction to 159 lbs. She is encouraged to maintain these efforts. Blood work will be repeated to monitor her condition.    3. Renal Insufficiency.  Her last blood work in March indicated borderline low kidney function. She is advised to avoid NSAIDs such as Motrin, Aleve, Advil, and ibuprofen, and to use Tylenol for any aches, pains, or fever. Blood work will be repeated to monitor kidney function.    4. Pulmonary Hypertension.  An echocardiogram performed in 2018 revealed pulmonary hypertension and nonobstructive coronary artery disease. She is advised to continue following up with her cardiologist and adhere to their recommendations, including well-controlled blood pressure, sugar, weight, diet, and exercise.    5. Sleep Issues.  She is taking trazodone as needed for sleep. She is advised to maintain good sleep hygiene, such as going to bed at the same time each night, avoiding late-night eating, and not checking the time if she wakes up during the night.    6. Medication Management.  Ibuprofen and Advil have been removed from her medication list. She is advised to take Tylenol if needed for pain relief.    7. Health Maintenance.  She is due for her RSV and COVID-19 booster shots, which she can

## 2024-10-31 LAB
25(OH)D3+25(OH)D2 SERPL-MCNC: 62.4 NG/ML (ref 30–100)
ALBUMIN SERPL-MCNC: 4.4 G/DL (ref 3.8–4.9)
ALP SERPL-CCNC: 96 IU/L (ref 44–121)
ALT SERPL-CCNC: 19 IU/L (ref 0–32)
AST SERPL-CCNC: 21 IU/L (ref 0–40)
BILIRUB SERPL-MCNC: 0.4 MG/DL (ref 0–1.2)
BUN SERPL-MCNC: 14 MG/DL (ref 8–27)
BUN/CREAT SERPL: 16 (ref 12–28)
CALCIUM SERPL-MCNC: 9.4 MG/DL (ref 8.7–10.3)
CHLORIDE SERPL-SCNC: 99 MMOL/L (ref 96–106)
CHOLEST SERPL-MCNC: 134 MG/DL (ref 100–199)
CO2 SERPL-SCNC: 27 MMOL/L (ref 20–29)
CREAT SERPL-MCNC: 0.9 MG/DL (ref 0.57–1)
EGFRCR SERPLBLD CKD-EPI 2021: 73 ML/MIN/1.73
GLOBULIN SER CALC-MCNC: 2.6 G/DL (ref 1.5–4.5)
GLUCOSE SERPL-MCNC: 98 MG/DL (ref 70–99)
HBA1C MFR BLD: 6.5 % (ref 4.8–5.6)
HDLC SERPL-MCNC: 43 MG/DL
LDLC SERPL CALC-MCNC: 76 MG/DL (ref 0–99)
POTASSIUM SERPL-SCNC: 3.9 MMOL/L (ref 3.5–5.2)
PROT SERPL-MCNC: 7 G/DL (ref 6–8.5)
SODIUM SERPL-SCNC: 140 MMOL/L (ref 134–144)
SPECIMEN STATUS REPORT: NORMAL
TRIGL SERPL-MCNC: 75 MG/DL (ref 0–149)
VLDLC SERPL CALC-MCNC: 15 MG/DL (ref 5–40)

## (undated) DEVICE — DRAPE,ANGIO,BRACH,STERILE,38X44: Brand: MEDLINE

## (undated) DEVICE — PERCLOSE PROGLIDE™ SUTURE-MEDIATED CLOSURE SYSTEM: Brand: PERCLOSE PROGLIDE™

## (undated) DEVICE — BAND RADIAL COMPR ARTERY 24CM -- REG BX/10

## (undated) DEVICE — PRESSURE MONITORING SET: Brand: TRUWAVE

## (undated) DEVICE — PROCEDURE KIT FLUID MGMT 10 FR CUST MAINFOLD

## (undated) DEVICE — PACK PROCEDURE SURG VASC CATH 161 MMC LF

## (undated) DEVICE — RADIFOCUS OPTITORQUE ANGIOGRAPHIC CATHETER: Brand: OPTITORQUE

## (undated) DEVICE — CATHETER DIAG AD L100CM DIA6FR STD JUDKINS L 4 POLYUR COR

## (undated) DEVICE — SET FLD ADMIN 3 W STPCOCK FIX FEM L BOR 1IN

## (undated) DEVICE — GLIDESHEATH SLENDER STAINLESS STEEL KIT: Brand: GLIDESHEATH SLENDER

## (undated) DEVICE — CATHETER ANGIO JR4 STD 0.038 IN 6 FRX100 CM SUPER TORQUE +